# Patient Record
Sex: MALE | Race: WHITE | NOT HISPANIC OR LATINO | URBAN - METROPOLITAN AREA
[De-identification: names, ages, dates, MRNs, and addresses within clinical notes are randomized per-mention and may not be internally consistent; named-entity substitution may affect disease eponyms.]

---

## 2019-01-11 ENCOUNTER — INPATIENT (INPATIENT)
Facility: HOSPITAL | Age: 79
LOS: 17 days | Discharge: HOME | DRG: 246 | End: 2019-01-29
Attending: HOSPITALIST | Admitting: INTERNAL MEDICINE
Payer: COMMERCIAL

## 2019-01-11 VITALS
RESPIRATION RATE: 18 BRPM | WEIGHT: 222.01 LBS | HEIGHT: 67 IN | TEMPERATURE: 98 F | OXYGEN SATURATION: 100 % | DIASTOLIC BLOOD PRESSURE: 94 MMHG | SYSTOLIC BLOOD PRESSURE: 150 MMHG | HEART RATE: 66 BPM

## 2019-01-11 DIAGNOSIS — Z95.1 PRESENCE OF AORTOCORONARY BYPASS GRAFT: Chronic | ICD-10-CM

## 2019-01-11 DIAGNOSIS — I47.2 VENTRICULAR TACHYCARDIA: ICD-10-CM

## 2019-01-11 DIAGNOSIS — Z95.5 PRESENCE OF CORONARY ANGIOPLASTY IMPLANT AND GRAFT: Chronic | ICD-10-CM

## 2019-01-11 PROBLEM — Z00.00 ENCOUNTER FOR PREVENTIVE HEALTH EXAMINATION: Status: ACTIVE | Noted: 2019-01-11

## 2019-01-11 LAB
ALBUMIN SERPL ELPH-MCNC: 3.2 G/DL — LOW (ref 3.3–5)
ALP SERPL-CCNC: 68 U/L — SIGNIFICANT CHANGE UP (ref 40–120)
ALT FLD-CCNC: 54 U/L — HIGH (ref 10–45)
ANION GAP SERPL CALC-SCNC: 11 MMOL/L — SIGNIFICANT CHANGE UP (ref 5–17)
APTT BLD: 27.2 SEC — LOW (ref 27.5–36.3)
AST SERPL-CCNC: 22 U/L — SIGNIFICANT CHANGE UP (ref 10–40)
BASOPHILS # BLD AUTO: 0.1 K/UL — SIGNIFICANT CHANGE UP (ref 0–0.2)
BASOPHILS NFR BLD AUTO: 1 % — SIGNIFICANT CHANGE UP (ref 0–2)
BILIRUB SERPL-MCNC: 0.7 MG/DL — SIGNIFICANT CHANGE UP (ref 0.2–1.2)
BLD GP AB SCN SERPL QL: NEGATIVE — SIGNIFICANT CHANGE UP
BUN SERPL-MCNC: 24 MG/DL — HIGH (ref 7–23)
CALCIUM SERPL-MCNC: 8.7 MG/DL — SIGNIFICANT CHANGE UP (ref 8.4–10.5)
CHLORIDE SERPL-SCNC: 105 MMOL/L — SIGNIFICANT CHANGE UP (ref 96–108)
CK MB BLD-MCNC: 2.9 % — SIGNIFICANT CHANGE UP (ref 0–3.5)
CK MB CFR SERPL CALC: 2.7 NG/ML — SIGNIFICANT CHANGE UP (ref 0–6.7)
CK SERPL-CCNC: 92 U/L — SIGNIFICANT CHANGE UP (ref 30–200)
CO2 SERPL-SCNC: 28 MMOL/L — SIGNIFICANT CHANGE UP (ref 22–31)
CREAT SERPL-MCNC: 0.85 MG/DL — SIGNIFICANT CHANGE UP (ref 0.5–1.3)
EOSINOPHIL # BLD AUTO: 0.1 K/UL — SIGNIFICANT CHANGE UP (ref 0–0.5)
EOSINOPHIL NFR BLD AUTO: 2 % — SIGNIFICANT CHANGE UP (ref 0–6)
GAS PNL BLDA: SIGNIFICANT CHANGE UP
GLUCOSE BLDC GLUCOMTR-MCNC: 182 MG/DL — HIGH (ref 70–99)
GLUCOSE BLDC GLUCOMTR-MCNC: 184 MG/DL — HIGH (ref 70–99)
GLUCOSE SERPL-MCNC: 202 MG/DL — HIGH (ref 70–99)
HCT VFR BLD CALC: 36.2 % — LOW (ref 39–50)
HGB BLD-MCNC: 11.8 G/DL — LOW (ref 13–17)
INR BLD: 1.17 RATIO — HIGH (ref 0.88–1.16)
LYMPHOCYTES # BLD AUTO: 0.7 K/UL — LOW (ref 1–3.3)
LYMPHOCYTES # BLD AUTO: 11.8 % — LOW (ref 13–44)
MAGNESIUM SERPL-MCNC: 2.1 MG/DL — SIGNIFICANT CHANGE UP (ref 1.6–2.6)
MCHC RBC-ENTMCNC: 32.7 GM/DL — SIGNIFICANT CHANGE UP (ref 32–36)
MCHC RBC-ENTMCNC: 33.7 PG — SIGNIFICANT CHANGE UP (ref 27–34)
MCV RBC AUTO: 103 FL — HIGH (ref 80–100)
MONOCYTES # BLD AUTO: 0.3 K/UL — SIGNIFICANT CHANGE UP (ref 0–0.9)
MONOCYTES NFR BLD AUTO: 4.6 % — SIGNIFICANT CHANGE UP (ref 2–14)
NEUTROPHILS # BLD AUTO: 4.7 K/UL — SIGNIFICANT CHANGE UP (ref 1.8–7.4)
NEUTROPHILS NFR BLD AUTO: 80.7 % — HIGH (ref 43–77)
NT-PROBNP SERPL-SCNC: 2837 PG/ML — HIGH (ref 0–300)
PHOSPHATE SERPL-MCNC: 3.4 MG/DL — SIGNIFICANT CHANGE UP (ref 2.5–4.5)
PLATELET # BLD AUTO: 130 K/UL — LOW (ref 150–400)
POTASSIUM SERPL-MCNC: 4.3 MMOL/L — SIGNIFICANT CHANGE UP (ref 3.5–5.3)
POTASSIUM SERPL-SCNC: 4.3 MMOL/L — SIGNIFICANT CHANGE UP (ref 3.5–5.3)
PROT SERPL-MCNC: 5.9 G/DL — LOW (ref 6–8.3)
PROTHROM AB SERPL-ACNC: 13.4 SEC — HIGH (ref 10–12.9)
RBC # BLD: 3.51 M/UL — LOW (ref 4.2–5.8)
RBC # FLD: 13 % — SIGNIFICANT CHANGE UP (ref 10.3–14.5)
RH IG SCN BLD-IMP: POSITIVE — SIGNIFICANT CHANGE UP
SODIUM SERPL-SCNC: 144 MMOL/L — SIGNIFICANT CHANGE UP (ref 135–145)
TROPONIN T, HIGH SENSITIVITY RESULT: 185 NG/L — HIGH (ref 0–51)
WBC # BLD: 5.8 K/UL — SIGNIFICANT CHANGE UP (ref 3.8–10.5)
WBC # FLD AUTO: 5.8 K/UL — SIGNIFICANT CHANGE UP (ref 3.8–10.5)

## 2019-01-11 PROCEDURE — 93459 L HRT ART/GRFT ANGIO: CPT | Mod: 26,GC

## 2019-01-11 PROCEDURE — 71045 X-RAY EXAM CHEST 1 VIEW: CPT | Mod: 26

## 2019-01-11 PROCEDURE — 76937 US GUIDE VASCULAR ACCESS: CPT | Mod: 26,GC

## 2019-01-11 PROCEDURE — 93010 ELECTROCARDIOGRAM REPORT: CPT

## 2019-01-11 RX ORDER — SIMVASTATIN 20 MG/1
1 TABLET, FILM COATED ORAL
Qty: 0 | Refills: 0 | COMMUNITY

## 2019-01-11 RX ORDER — CHLORHEXIDINE GLUCONATE 213 G/1000ML
1 SOLUTION TOPICAL
Qty: 0 | Refills: 0 | Status: DISCONTINUED | OUTPATIENT
Start: 2019-01-11 | End: 2019-01-25

## 2019-01-11 RX ORDER — INSULIN LISPRO 100/ML
VIAL (ML) SUBCUTANEOUS AT BEDTIME
Qty: 0 | Refills: 0 | Status: DISCONTINUED | OUTPATIENT
Start: 2019-01-11 | End: 2019-01-29

## 2019-01-11 RX ORDER — INSULIN LISPRO 100/ML
VIAL (ML) SUBCUTANEOUS
Qty: 0 | Refills: 0 | Status: DISCONTINUED | OUTPATIENT
Start: 2019-01-11 | End: 2019-01-29

## 2019-01-11 RX ORDER — PANTOPRAZOLE SODIUM 20 MG/1
1 TABLET, DELAYED RELEASE ORAL
Qty: 0 | Refills: 0 | COMMUNITY

## 2019-01-11 RX ORDER — INSULIN GLARGINE 100 [IU]/ML
8 INJECTION, SOLUTION SUBCUTANEOUS ONCE
Qty: 0 | Refills: 0 | Status: COMPLETED | OUTPATIENT
Start: 2019-01-11 | End: 2019-01-12

## 2019-01-11 RX ORDER — ASPIRIN/CALCIUM CARB/MAGNESIUM 324 MG
81 TABLET ORAL DAILY
Qty: 0 | Refills: 0 | Status: DISCONTINUED | OUTPATIENT
Start: 2019-01-11 | End: 2019-01-29

## 2019-01-11 RX ORDER — LISINOPRIL 2.5 MG/1
10 TABLET ORAL DAILY
Qty: 0 | Refills: 0 | Status: DISCONTINUED | OUTPATIENT
Start: 2019-01-12 | End: 2019-01-12

## 2019-01-11 RX ORDER — GLUCAGON INJECTION, SOLUTION 0.5 MG/.1ML
1 INJECTION, SOLUTION SUBCUTANEOUS ONCE
Qty: 0 | Refills: 0 | Status: DISCONTINUED | OUTPATIENT
Start: 2019-01-11 | End: 2019-01-29

## 2019-01-11 RX ORDER — GLIMEPIRIDE 1 MG
1 TABLET ORAL
Qty: 0 | Refills: 0 | COMMUNITY

## 2019-01-11 RX ORDER — DEXTROSE 50 % IN WATER 50 %
12.5 SYRINGE (ML) INTRAVENOUS ONCE
Qty: 0 | Refills: 0 | Status: DISCONTINUED | OUTPATIENT
Start: 2019-01-11 | End: 2019-01-29

## 2019-01-11 RX ORDER — DEXTROSE 50 % IN WATER 50 %
25 SYRINGE (ML) INTRAVENOUS ONCE
Qty: 0 | Refills: 0 | Status: DISCONTINUED | OUTPATIENT
Start: 2019-01-11 | End: 2019-01-29

## 2019-01-11 RX ORDER — LOSARTAN POTASSIUM 100 MG/1
1 TABLET, FILM COATED ORAL
Qty: 0 | Refills: 0 | COMMUNITY

## 2019-01-11 RX ORDER — AMIODARONE HYDROCHLORIDE 400 MG/1
200 TABLET ORAL DAILY
Qty: 0 | Refills: 0 | Status: DISCONTINUED | OUTPATIENT
Start: 2019-01-11 | End: 2019-01-29

## 2019-01-11 RX ORDER — FUROSEMIDE 40 MG
40 TABLET ORAL ONCE
Qty: 0 | Refills: 0 | Status: COMPLETED | OUTPATIENT
Start: 2019-01-11 | End: 2019-01-11

## 2019-01-11 RX ORDER — SODIUM CHLORIDE 9 MG/ML
1000 INJECTION, SOLUTION INTRAVENOUS
Qty: 0 | Refills: 0 | Status: DISCONTINUED | OUTPATIENT
Start: 2019-01-11 | End: 2019-01-29

## 2019-01-11 RX ORDER — DEXTROSE 50 % IN WATER 50 %
15 SYRINGE (ML) INTRAVENOUS ONCE
Qty: 0 | Refills: 0 | Status: DISCONTINUED | OUTPATIENT
Start: 2019-01-11 | End: 2019-01-29

## 2019-01-11 RX ORDER — CLOPIDOGREL BISULFATE 75 MG/1
1 TABLET, FILM COATED ORAL
Qty: 0 | Refills: 0 | COMMUNITY

## 2019-01-11 RX ORDER — METOPROLOL TARTRATE 50 MG
50 TABLET ORAL DAILY
Qty: 0 | Refills: 0 | Status: DISCONTINUED | OUTPATIENT
Start: 2019-01-12 | End: 2019-01-28

## 2019-01-11 RX ORDER — HEPARIN SODIUM 5000 [USP'U]/ML
5000 INJECTION INTRAVENOUS; SUBCUTANEOUS EVERY 8 HOURS
Qty: 0 | Refills: 0 | Status: DISCONTINUED | OUTPATIENT
Start: 2019-01-11 | End: 2019-01-29

## 2019-01-11 RX ORDER — NYSTATIN 500MM UNIT
500000 POWDER (EA) MISCELLANEOUS
Qty: 0 | Refills: 0 | Status: DISCONTINUED | OUTPATIENT
Start: 2019-01-11 | End: 2019-01-25

## 2019-01-11 RX ORDER — CHOLECALCIFEROL (VITAMIN D3) 125 MCG
2000 CAPSULE ORAL DAILY
Qty: 0 | Refills: 0 | Status: DISCONTINUED | OUTPATIENT
Start: 2019-01-11 | End: 2019-01-29

## 2019-01-11 RX ORDER — PANTOPRAZOLE SODIUM 20 MG/1
40 TABLET, DELAYED RELEASE ORAL
Qty: 0 | Refills: 0 | Status: DISCONTINUED | OUTPATIENT
Start: 2019-01-11 | End: 2019-01-25

## 2019-01-11 RX ORDER — DUTASTERIDE 0.5 MG/1
1 CAPSULE, LIQUID FILLED ORAL
Qty: 0 | Refills: 0 | COMMUNITY

## 2019-01-11 RX ORDER — ALLOPURINOL 300 MG
1 TABLET ORAL
Qty: 0 | Refills: 0 | COMMUNITY

## 2019-01-11 RX ORDER — METFORMIN HYDROCHLORIDE 850 MG/1
1 TABLET ORAL
Qty: 0 | Refills: 0 | COMMUNITY

## 2019-01-11 RX ORDER — ATORVASTATIN CALCIUM 80 MG/1
40 TABLET, FILM COATED ORAL AT BEDTIME
Qty: 0 | Refills: 0 | Status: DISCONTINUED | OUTPATIENT
Start: 2019-01-11 | End: 2019-01-29

## 2019-01-11 RX ORDER — FUROSEMIDE 40 MG
40 TABLET ORAL DAILY
Qty: 0 | Refills: 0 | Status: DISCONTINUED | OUTPATIENT
Start: 2019-01-12 | End: 2019-01-15

## 2019-01-11 RX ORDER — ASPIRIN/CALCIUM CARB/MAGNESIUM 324 MG
1 TABLET ORAL
Qty: 0 | Refills: 0 | COMMUNITY

## 2019-01-11 RX ORDER — AMIODARONE HYDROCHLORIDE 400 MG/1
1 TABLET ORAL
Qty: 0 | Refills: 0 | COMMUNITY

## 2019-01-11 RX ORDER — CHOLECALCIFEROL (VITAMIN D3) 125 MCG
1 CAPSULE ORAL
Qty: 0 | Refills: 0 | COMMUNITY

## 2019-01-11 RX ORDER — METFORMIN HYDROCHLORIDE 850 MG/1
2 TABLET ORAL
Qty: 0 | Refills: 0 | COMMUNITY

## 2019-01-11 RX ORDER — CLOPIDOGREL BISULFATE 75 MG/1
75 TABLET, FILM COATED ORAL DAILY
Qty: 0 | Refills: 0 | Status: DISCONTINUED | OUTPATIENT
Start: 2019-01-11 | End: 2019-01-29

## 2019-01-11 RX ADMIN — Medication 40 MILLIGRAM(S): at 23:51

## 2019-01-11 RX ADMIN — CHLORHEXIDINE GLUCONATE 1 APPLICATION(S): 213 SOLUTION TOPICAL at 21:31

## 2019-01-11 NOTE — PROGRESS NOTE ADULT - SUBJECTIVE AND OBJECTIVE BOX
ACCEPT NOTE     PATIENT:  AUDIE SEGURA  [id number]    CHIEF COMPLAINT:  Patient is a 78y old  Male who presents with a chief complaint of     INTERVAL HISTORYOVERNIGHT EVENTS:  s/p catherization. No acute events.     REVIEW OF SYSTEMS:    Constitutional:     [ ] negative [ ] fevers [ ] chills [ ] weight loss [ ] weight gain  HEENT:                  [ ] negative [ ] dry eyes [ ] eye irritation [ ] postnasal drip [ ] nasal congestion  CV:                         [ ] negative  [ ] chest pain [ ] orthopnea [ ] palpitations [ ] murmur  Resp:                     [ ] negative [ ] cough [ ] shortness of breath [ ] dyspnea [ ] wheezing [ ] sputum [ ] hemoptysis  GI:                          [ ] negative [ ] nausea [ ] vomiting [ ] diarrhea [ ] constipation [ ] abd pain [ ] dysphagia   :                        [ ] negative [ ] dysuria [ ] nocturia [ ] hematuria [ ] increased urinary frequency  Musculoskeletal: [ ] negative [ ] back pain [ ] myalgias [ ] arthralgias [ ] fracture  Skin:                       [ ] negative [ ] rash [ ] itch  Neurological:        [ ] negative [ ] headache [ ] dizziness [ ] syncope [ ] weakness [ ] numbness  Psychiatric:           [ ] negative [ ] anxiety [ ] depression  Endocrine:            [ ] negative [ ] diabetes [ ] thyroid problem  Heme/Lymph:      [ ] negative [ ] anemia [ ] bleeding problem  Allergic/Immune: [ ] negative [ ] itchy eyes [ ] nasal discharge [ ] hives [ ] angioedema    [ ] All other systems negative  [ x] Unable to assess ROS because patient's mental status.     MEDICATIONS:  MEDICATIONS  (STANDING):  aspirin enteric coated 81 milliGRAM(s) Oral daily  dextrose 5%. 1000 milliLiter(s) (50 mL/Hr) IV Continuous <Continuous>  dextrose 50% Injectable 12.5 Gram(s) IV Push once  dextrose 50% Injectable 25 Gram(s) IV Push once  dextrose 50% Injectable 25 Gram(s) IV Push once  insulin lispro (HumaLOG) corrective regimen sliding scale   SubCutaneous three times a day before meals  insulin lispro (HumaLOG) corrective regimen sliding scale   SubCutaneous at bedtime    MEDICATIONS  (PRN):  dextrose 40% Gel 15 Gram(s) Oral once PRN Blood Glucose LESS THAN 70 milliGRAM(s)/deciliter  glucagon  Injectable 1 milliGRAM(s) IntraMuscular once PRN Glucose LESS THAN 70 milligrams/deciliter    ALLERGIES:  Allergies  No Known Allergies  Intolerances    OBJECTIVE:  ICU Vital Signs Last 24 Hrs  T(C): 36.6 (2019 14:11), Max: 36.6 (2019 13:23)  T(F): 97.9 (2019 14:11), Max: 97.9 (2019 14:11)  HR: 63 (2019 19:35) (63 - 66)  BP: 155/63 (2019 19:35) (117/101 - 155/63)  BP(mean): 112 (2019 14:11) (112 - 112)  RR: 16 (2019 19:35) (14 - 19)  SpO2: 100% (2019 19:35) (100% - 100%)      POCT Blood Glucose.: 184 mg/dL (2019 19:06)  POCT Blood Glucose.: 182 mg/dL (2019 13:34)    CAPILLARY BLOOD GLUCOSE  POCT Blood Glucose.: 184 mg/dL (2019 19:06)    I&O's Summary    2019 07:01  -  2019 20:14  --------------------------------------------------------  IN: 0 mL / OUT: 450 mL / NET: -450 mL    Daily Height in cm: 170.18 (2019 14:11)    Daily Weight in k.7 (2019 14:11)    PHYSICAL EXAMINATION:  General: WN/WD NAD  HEENT: PERRLA, EOMI, moist mucous membranes  Neurology: A&Ox3, nonfocal, RIVAS x 4  Respiratory: CTA B/L, normal respiratory effort, no wheezes, crackles, rales  CV: RRR, S1S2, no murmurs, rubs or gallops  Abdominal: Soft, NT, ND +BS, Last BM  Extremities: No edema, + peripheral pulses  Skin: Pressure ulcer        LABS:    TELEMETRY:     EKG:     IMAGING: ACCEPT NOTE     PATIENT:  AUDIE SEGURA  [id number]    CHIEF COMPLAINT:  Patient is a 78y old  Male who presents with a chief complaint of     INTERVAL HISTORYOVERNIGHT EVENTS:  s/p catherization. No acute events.     REVIEW OF SYSTEMS:    Constitutional:     [ ] negative [ ] fevers [ ] chills [ ] weight loss [ ] weight gain  HEENT:                  [ ] negative [ ] dry eyes [ ] eye irritation [ ] postnasal drip [ ] nasal congestion  CV:                         [ ] negative  [ ] chest pain [ ] orthopnea [ ] palpitations [ ] murmur  Resp:                     [ ] negative [ ] cough [ ] shortness of breath [ ] dyspnea [ ] wheezing [ ] sputum [ ] hemoptysis  GI:                          [ ] negative [ ] nausea [ ] vomiting [ ] diarrhea [ ] constipation [ ] abd pain [ ] dysphagia   :                        [ ] negative [ ] dysuria [ ] nocturia [ ] hematuria [ ] increased urinary frequency  Musculoskeletal: [ ] negative [ ] back pain [ ] myalgias [ ] arthralgias [ ] fracture  Skin:                       [ ] negative [ ] rash [ ] itch  Neurological:        [ ] negative [ ] headache [ ] dizziness [ ] syncope [ ] weakness [ ] numbness  Psychiatric:           [ ] negative [ ] anxiety [ ] depression  Endocrine:            [ ] negative [ ] diabetes [ ] thyroid problem  Heme/Lymph:      [ ] negative [ ] anemia [ ] bleeding problem  Allergic/Immune: [ ] negative [ ] itchy eyes [ ] nasal discharge [ ] hives [ ] angioedema    [ ] All other systems negative  [ x] Unable to assess ROS because patient's mental status.     MEDICATIONS:  MEDICATIONS  (STANDING):  aspirin enteric coated 81 milliGRAM(s) Oral daily  dextrose 5%. 1000 milliLiter(s) (50 mL/Hr) IV Continuous <Continuous>  dextrose 50% Injectable 12.5 Gram(s) IV Push once  dextrose 50% Injectable 25 Gram(s) IV Push once  dextrose 50% Injectable 25 Gram(s) IV Push once  insulin lispro (HumaLOG) corrective regimen sliding scale   SubCutaneous three times a day before meals  insulin lispro (HumaLOG) corrective regimen sliding scale   SubCutaneous at bedtime    MEDICATIONS  (PRN):  dextrose 40% Gel 15 Gram(s) Oral once PRN Blood Glucose LESS THAN 70 milliGRAM(s)/deciliter  glucagon  Injectable 1 milliGRAM(s) IntraMuscular once PRN Glucose LESS THAN 70 milligrams/deciliter    ALLERGIES:  Allergies  No Known Allergies  Intolerances    OBJECTIVE:  ICU Vital Signs Last 24 Hrs  T(C): 36.6 (2019 14:11), Max: 36.6 (2019 13:23)  T(F): 97.9 (2019 14:11), Max: 97.9 (2019 14:11)  HR: 63 (2019 19:35) (63 - 66)  BP: 155/63 (2019 19:35) (117/101 - 155/63)  BP(mean): 112 (2019 14:11) (112 - 112)  RR: 16 (2019 19:35) (14 - 19)  SpO2: 100% (2019 19:35) (100% - 100%)      POCT Blood Glucose.: 184 mg/dL (2019 19:06)  POCT Blood Glucose.: 182 mg/dL (2019 13:34)    CAPILLARY BLOOD GLUCOSE  POCT Blood Glucose.: 184 mg/dL (2019 19:06)    I&O's Summary    2019 07:01  -  2019 20:14  --------------------------------------------------------  IN: 0 mL / OUT: 450 mL / NET: -450 mL    Daily Height in cm: 170.18 (2019 14:11)    Daily Weight in k.7 (2019 14:11)    PHYSICAL EXAMINATION:  General: WN/WD NAD  HEENT: PERRLA, EOMI, moist mucous membranes  Neurology: A&Ox1-2, nonfocal,  Respiratory: Crackles b/l./  CV: RRR, S1S2, no murmurs, rubs or gallops  Abdominal: Soft, NT, ND +BS, Last BM  Extremities: +2 edema bilaterally. + peripheral pulses  Skin: Pressure ulcer        LABS:    TELEMETRY:     EKG:     IMAGING: CCU ACCEPT NOTE     PATIENT:  AUDIE SEGURA  [id number]    CHIEF COMPLAINT:  Patient is a 78y old  Male who presents with a chief complaint of transfer from [facility] for cardiac catheterization.     INTERVAL HISTORY:  Patient arrived to the CCU floor s/p cardiac catheterization which found 95% LCx in stent thrombosis and 95% stenosis to the RPDA.     REVIEW OF SYSTEMS:    Constitutional:     [ ] negative [ ] fevers [ ] chills [ ] weight loss [ ] weight gain  HEENT:                  [ ] negative [ ] dry eyes [ ] eye irritation [ ] postnasal drip [ ] nasal congestion  CV:                         [ ] negative  [ ] chest pain [ ] orthopnea [ ] palpitations [ ] murmur  Resp:                     [ ] negative [ ] cough [ ] shortness of breath [ ] dyspnea [ ] wheezing [ ] sputum [ ] hemoptysis  GI:                          [ ] negative [ ] nausea [ ] vomiting [ ] diarrhea [ ] constipation [ ] abd pain [ ] dysphagia   :                        [ ] negative [ ] dysuria [ ] nocturia [ ] hematuria [ ] increased urinary frequency  Musculoskeletal: [ ] negative [ ] back pain [ ] myalgias [ ] arthralgias [ ] fracture  Skin:                       [ ] negative [ ] rash [ ] itch  Neurological:        [ ] negative [ ] headache [ ] dizziness [ ] syncope [ ] weakness [ ] numbness  Psychiatric:           [ ] negative [ ] anxiety [ ] depression  Endocrine:            [ ] negative [ ] diabetes [ ] thyroid problem  Heme/Lymph:      [ ] negative [ ] anemia [ ] bleeding problem  Allergic/Immune: [ ] negative [ ] itchy eyes [ ] nasal discharge [ ] hives [ ] angioedema    [ ] All other systems negative  [ x] Unable to assess ROS because patient's mental status.     MEDICATIONS:  MEDICATIONS  (STANDING):  aspirin enteric coated 81 milliGRAM(s) Oral daily  dextrose 5%. 1000 milliLiter(s) (50 mL/Hr) IV Continuous <Continuous>  dextrose 50% Injectable 12.5 Gram(s) IV Push once  dextrose 50% Injectable 25 Gram(s) IV Push once  dextrose 50% Injectable 25 Gram(s) IV Push once  insulin lispro (HumaLOG) corrective regimen sliding scale   SubCutaneous three times a day before meals  insulin lispro (HumaLOG) corrective regimen sliding scale   SubCutaneous at bedtime    MEDICATIONS  (PRN):  dextrose 40% Gel 15 Gram(s) Oral once PRN Blood Glucose LESS THAN 70 milliGRAM(s)/deciliter  glucagon  Injectable 1 milliGRAM(s) IntraMuscular once PRN Glucose LESS THAN 70 milligrams/deciliter    ALLERGIES:  Allergies  No Known Allergies  Intolerances    OBJECTIVE:  ICU Vital Signs Last 24 Hrs  T(C): 36.6 (2019 14:11), Max: 36.6 (2019 13:23)  T(F): 97.9 (2019 14:11), Max: 97.9 (2019 14:11)  HR: 63 (2019 19:35) (63 - 66)  BP: 155/63 (2019 19:35) (117/101 - 155/63)  BP(mean): 112 (2019 14:11) (112 - 112)  RR: 16 (2019 19:35) (14 - 19)  SpO2: 100% (2019 19:35) (100% - 100%)      POCT Blood Glucose.: 184 mg/dL (2019 19:06)  POCT Blood Glucose.: 182 mg/dL (2019 13:34)    CAPILLARY BLOOD GLUCOSE  POCT Blood Glucose.: 184 mg/dL (2019 19:06)    I&O's Summary    2019 07:01  -  2019 20:14  --------------------------------------------------------  IN: 0 mL / OUT: 450 mL / NET: -450 mL    Daily Height in cm: 170.18 (2019 14:11)    Daily Weight in k.7 (2019 14:11)    PHYSICAL EXAMINATION:  General: WN/WD NAD  HEENT: PERRLA, EOMI, moist mucous membranes  Neurology: A&Ox1-2, nonfocal,  Respiratory: Crackles b/l./  CV: RRR, S1S2, no murmurs, rubs or gallops  Abdominal: Soft, NT, ND +BS, Last BM  Extremities: +2 edema bilaterally. + peripheral pulses  Skin: Pressure ulcer        LABS:    TELEMETRY:     EKG:     IMAGING: CCU ACCEPT NOTE     PATIENT:  AUDIE SEGURA  [id number]    CHIEF COMPLAINT:  Patient is a 78y old  Male who presents with a chief complaint of transfer from [facility] for cardiac catheterization.     INTERVAL HISTORY:  Patient arrived to the CCU floor s/p cardiac catheterization which found 95% LCx in stent thrombosis and 95% stenosis to the RPDA. Patient was transferred to the CCU for monitoring and optimization prior to further intervention.     REVIEW OF SYSTEMS:    Constitutional:     [ ] negative [ ] fevers [ ] chills [ ] weight loss [ ] weight gain  HEENT:                  [ ] negative [ ] dry eyes [ ] eye irritation [ ] postnasal drip [ ] nasal congestion  CV:                         [ ] negative  [ ] chest pain [ ] orthopnea [ ] palpitations [ ] murmur  Resp:                     [ ] negative [ ] cough [ ] shortness of breath [ ] dyspnea [ ] wheezing [ ] sputum [ ] hemoptysis  GI:                          [ ] negative [ ] nausea [ ] vomiting [ ] diarrhea [ ] constipation [ ] abd pain [ ] dysphagia   :                        [ ] negative [ ] dysuria [ ] nocturia [ ] hematuria [ ] increased urinary frequency  Musculoskeletal: [ ] negative [ ] back pain [ ] myalgias [ ] arthralgias [ ] fracture  Skin:                       [ ] negative [ ] rash [ ] itch  Neurological:        [ ] negative [ ] headache [ ] dizziness [ ] syncope [ ] weakness [ ] numbness  Psychiatric:           [ ] negative [ ] anxiety [ ] depression  Endocrine:            [ ] negative [ ] diabetes [ ] thyroid problem  Heme/Lymph:      [ ] negative [ ] anemia [ ] bleeding problem  Allergic/Immune: [ ] negative [ ] itchy eyes [ ] nasal discharge [ ] hives [ ] angioedema    [ ] All other systems negative  [ x] Unable to assess ROS because patient's mental status.     MEDICATIONS:  MEDICATIONS  (STANDING):  aspirin enteric coated 81 milliGRAM(s) Oral daily  dextrose 5%. 1000 milliLiter(s) (50 mL/Hr) IV Continuous <Continuous>  dextrose 50% Injectable 12.5 Gram(s) IV Push once  dextrose 50% Injectable 25 Gram(s) IV Push once  dextrose 50% Injectable 25 Gram(s) IV Push once  insulin lispro (HumaLOG) corrective regimen sliding scale   SubCutaneous three times a day before meals  insulin lispro (HumaLOG) corrective regimen sliding scale   SubCutaneous at bedtime    MEDICATIONS  (PRN):  dextrose 40% Gel 15 Gram(s) Oral once PRN Blood Glucose LESS THAN 70 milliGRAM(s)/deciliter  glucagon  Injectable 1 milliGRAM(s) IntraMuscular once PRN Glucose LESS THAN 70 milligrams/deciliter    ALLERGIES:  Allergies  No Known Allergies  Intolerances    OBJECTIVE:  ICU Vital Signs Last 24 Hrs  T(C): 36.6 (2019 14:11), Max: 36.6 (2019 13:23)  T(F): 97.9 (2019 14:11), Max: 97.9 (2019 14:11)  HR: 63 (2019 19:35) (63 - 66)  BP: 155/63 (2019 19:35) (117/101 - 155/63)  BP(mean): 112 (2019 14:11) (112 - 112)  RR: 16 (2019 19:35) (14 - 19)  SpO2: 100% (2019 19:35) (100% - 100%)      POCT Blood Glucose.: 184 mg/dL (2019 19:06)  POCT Blood Glucose.: 182 mg/dL (2019 13:34)    CAPILLARY BLOOD GLUCOSE  POCT Blood Glucose.: 184 mg/dL (2019 19:06)    I&O's Summary    2019 07:01  -  2019 20:14  --------------------------------------------------------  IN: 0 mL / OUT: 450 mL / NET: -450 mL    Daily Height in cm: 170.18 (2019 14:11)    Daily Weight in k.7 (2019 14:11)    PHYSICAL EXAMINATION:  General: WN/WD NAD  HEENT: PERRLA, EOMI, moist mucous membranes  Neurology: A&Ox1-2, nonfocal,  Respiratory: Crackles b/l./  CV: RRR, S1S2, no murmurs, rubs or gallops  Abdominal: Soft, NT, ND +BS, Last BM  Extremities: +2 edema bilaterally. + peripheral pulses  Skin: Pressure ulcer        LABS:                        11.8   5.8   )-----------( 130      ( 2019 20:46 )             36.2           144  |  105  |  24<H>  ----------------------------<  202<H>  4.3   |  28  |  0.85    Ca    8.7      2019 20:46  Phos  3.4       Mg     2.1         TPro  5.9<L>  /  Alb  3.2<L>  /  TBili  0.7  /  DBili  x   /  AST  22  /  ALT  54<H>  /  AlkPhos  68      Troponin T, High Sensitivity (19 @ 20:46)    Troponin T, High Sensitivity Result: 185:     Serum Pro-Brain Natriuretic Peptide (19 @ 20:46)    Serum Pro-Brain Natriuretic Peptide: 2837 pg/mL      TELEMETRY: normal sinus rhythm     EKG: sinus rhythm at 68 BPM, right bundle branch block noted    IMAGING: CXR pending

## 2019-01-11 NOTE — H&P CARDIOLOGY - PMH
CAD (coronary artery disease)    DM (diabetes mellitus)  A1c 7.1  HLD (hyperlipidemia)    HTN (hypertension)    MI (myocardial infarction)

## 2019-01-11 NOTE — PROGRESS NOTE ADULT - ASSESSMENT
77 y/o male with PMHx of CAD with s/p CABG 4v in 1997, multiple stents, HTN, PUD, Gout, DM2 presented to [facility] on Dec 31st after unwitnessed fall. Admitted to [facility] on telemetry floor to r/o mechanical vs Arrhythmogenic syncope, w/ elevated trops. Hospital course c/b V. Fib arrest ROSC ~ 20 minutes of CPR,  intubated and moved to ICU. Patient got self extubated 1/9, continued on BiPAP,. Hospital course was c/b Sepsis 2/2 aspiration pneumonia and CHF. Transferred to [facility] for cardiac evaluation; cardiac cath and possible AICD placement.       #Neuro  - AxO1   #Resp  -     #CV  - Aspirin, statin     #GI    #ID    #Renal    #Heme    #Endo  - Moderate SSI. Hgba1c, TSH ordered.     #Skin  - Pressure ulcer.  Wound consult     #DVT PPx 79 y/o male with PMHx of CAD with s/p CABG 4v in 1997, multiple stents, HTN, PUD, Gout, DM2, and Acute HFrEF (EF 30-35%)     presented to [facility] on Dec 31st after unwitnessed fall. Admitted to [facility] on telemetry floor to r/o mechanical vs Arrhythmogenic syncope, w/ elevated trops. Hospital course c/b V. Fib arrest ROSC ~ 20 minutes of CPR,  intubated and moved to ICU. Patient got self extubated 1/9, continued on BiPAP,. Hospital course was c/b/ NSTEMI, Sepsis 2/2 aspiration pneumonia and CHF. Transferred to [facility] for cardiac evaluation; cardiac cath and possible AICD placement. s/p C with multi vessel disease.       #Neuro  - AxO1-2     #Resp  - c/w bipap     #CV  - Aspirin, statin     #GI    #ID    #Renal    #Heme    #Endo  - Moderate SSI. Hgba1c, TSH ordered.     #Skin  - Pressure ulcer.  Wound consult     #DVT PPx 77 y/o male with PMHx of CAD with s/p CABG 4v in 1997, multiple stents, HTN, PUD, Gout, DM2, and Acute HFrEF (EF 30-35%) who presented to [facility] on Dec 31st after an unwitnessed fall. Admitted to [facility] on telemetry floor to r/o mechanical vs Arrhythmogenic syncope, w/ elevated trops. Hospital course c/b V. Fib arrest ROSC ~ 20 minutes of CPR, intubated and moved to ICU. Patient self extubated on 1/9 and was continued on BiPAP. His hospital course was complicated by NSTEMI, Sepsis 2/2 aspiration pneumonia and CHF. He was transferred to [facility] for cardiac evaluation; cardiac cath and possible AICD placement. He is now s/p [facility] with multi vessel disease.       #Neuro  - AxO1-2     #Resp  - patient was intubated after Vfib arrest and self extubated himself on 1/9, has been using BiPAP since  - in CCU, weaned off BiPAP onto NC and sating well  - was treated for aspiration PNA with vancomycin and zosyn, no signs of infection at this time  - will monitor off abx     #CV  [NSTEMI with VT arrest]  - patient s/p VT arrest with ROSC  - s/p amio gtt and now on PO amio 200mg daily   - continue ASA and plavix  - s/p [facility] showing 95% disease in LCx and 95% stenosis to the RPDA with plans for intervention after diuresis and optimization of respiratory status   - continue lisinopril 10mg daily and toprol 50mg daily  - continue statin    [HFrEF]  - per echo at [facility], EF 35% with severely decreased LF systolic function  - continue to assess volume status and diurese if needed clinically  - follow up CXR to assess for pulmonary edema       #GI  - DASH/CC diet when off BiPAP    #ID  - patient treated for aspiration pneumonia with vancomycin and zosyn but unclear for what duration  - patient afebrile with no leukocytosis and no signs of PNA at this time  - per [facility] records, blood cultures repeated on 1/8 with NGTD  - CXR pending, will monitor off abx for now and start if clinically warranted    #Renal  - monitor electrolytes carefully  - patient appears to be at baseline Cr of 0.85    #Heme  - no acute issues    #Endo  - patient with known T2DM, on levemir 35U BID at home  - was having problems with hypoglycemia at OSH  - will given 15U lantus tonight and place on moderate sliding scale  - uptitrate insulin as needed     #Skin  - Pressure ulcer.  Wound consult     #DVT PPx  - heparin subq    #Ethics  - patient with completed MOLST form in chart stating patient is DNR/DNI however form was rescinded on 1/9 by daughter for cath? Need to follow up with family about goals 79 y/o male with PMHx of CAD with s/p CABG 4v in 1997, multiple stents, HTN, PUD, Gout, DM2, and Acute HFrEF (EF 30-35%) who presented to [facility] on Dec 31st after an unwitnessed fall. Admitted to [facility] on telemetry floor to r/o mechanical vs Arrhythmogenic syncope, w/ elevated trops. Hospital course c/b V. Fib arrest ROSC ~ 20 minutes of CPR, intubated and moved to ICU. Patient self extubated on 1/9 and was continued on BiPAP. His hospital course was complicated by NSTEMI, Sepsis 2/2 aspiration pneumonia and CHF. He was transferred to [facility] for cardiac evaluation; cardiac cath and possible AICD placement. He is now s/p [facility] with multi vessel disease.       #Neuro  - AxO1-2     #Resp  - patient was intubated after Vfib arrest and self extubated himself on 1/9, has been using BiPAP since  - in CCU, weaned off BiPAP onto NC and sating well  - was treated for aspiration PNA with vancomycin and zosyn, no signs of infection at this time  - will monitor off abx     #CV  [NSTEMI with VT arrest]  - patient s/p VT arrest with ROSC  - s/p amio gtt and now on PO amio 200mg daily   - continue ASA and plavix  - s/p [facility] showing 95% disease in LCx and 95% stenosis to the RPDA with plans for intervention after diuresis and optimization of respiratory status   - holding lisinopril in anticipation of cath and continue toprol 50mg daily  - continue statin    [HFrEF]  - per echo at [facility], EF 35% with severely decreased LF systolic function  - CXR with pulmonary edema and ronchorous breath sounds on exam  - IV lasix 40mg daily       #GI  - DASH/CC diet when off BiPAP    #ID  - patient treated for aspiration pneumonia with vancomycin and zosyn but unclear for what duration  - patient afebrile with no leukocytosis and no signs of PNA at this time  - per [facility] records, blood cultures repeated on 1/8 with NGTD  - CXR without signs of consolidation will monitor off abx for now and start if clinically warranted    #Renal  - monitor electrolytes carefully  - patient appears to be at baseline Cr of 0.85    #Heme  - no acute issues    #Endo  - patient with known T2DM, on levemir 35U BID at home  - was having problems with hypoglycemia at OSH  - will give 8U lantus tonight and place on moderate sliding scale  - uptitrate insulin as needed     #Skin  - Pressure ulcer.  Wound consult     #DVT PPx  - heparin subq    #Ethics  - patient with completed MOLST form in chart stating patient is DNR/DNI however form was rescinded on 1/9 by daughter for cath? Need to follow up with family about goals

## 2019-01-11 NOTE — H&P CARDIOLOGY - HISTORY OF PRESENT ILLNESS
79 y/o male with PMHx of CAD with s/p CABG 4v in 1997, multiple stents, HTN, PUD, Gout, DM2 presented to [facility] on Dec 31st after unwitnessed fall. According to family, patient lives alone and they believe they found him on the floor after two days. Patient got admitted to [facility] on telemetry floor to r/o mechanical vs Arrhythmogenic. While admitted on the floor patient had V. Fib arrest with full code, returned to rhythm after 20 minutes of CPR,  intubated and moved to ICU. Patient got self extubated two days ago and was put on BiPAP, which is tolerating well. Patient at present is oriented to self and immediate family. Patient had an Echo done on 12/31 with EF of 60% and repeat TTE on 1/2 showed EF of30-35%. Was diagnosed with septic shock and was on pressors and antibiotics ( course completed as per documents) Patient had  CHF and aspiration PNA and elevated troponin during the hospital stay. Transferred to [facility] for cardiac evaluation; cardiac cath and possible AICD placement. Patient on BiPAP with 35% with saturation of 100%.  GFR 58 from 1/8, Creat: 1 from 1/10, H/H: 10.3/33.8, PLT: 127  Patient has +3 edema on florentino Left UE and un stageable Pressure ulcer on the sacrum  Patient is DNR but rescind for cath. 77 y/o male with PMHx of CAD with s/p CABG 4v in 1997, multiple stents, HTN, PUD, Gout, DM2 presented to [facility] on Dec 31st after unwitnessed fall. According to family, patient lives alone and they believe they found him on the floor after two days. Patient got admitted to [facility] on telemetry floor to r/o mechanical vs Arrhythmogenic. While admitted on the floor patient had V. Fib arrest with full code, returned to rhythm after 20 minutes of CPR,  intubated and moved to ICU. Patient got self extubated two days ago and was put on BiPAP, which is tolerating well. Patient at present is oriented to self and immediate family. Patient had an Echo done on 12/31 with EF of 60% and repeat TTE on 1/2 showed EF of30-35%. Was diagnosed with septic shock and was on pressors and antibiotics ( course completed as per documents) Patient had  CHF and aspiration PNA and elevated troponin during the hospital stay. Transferred to [facility] for cardiac evaluation; cardiac cath and possible AICD placement. Patient on BiPAP with 35% with saturation of 100%.  CT chest on 1/8:mils b/l pleural effusion with adjacent atelectasis. Right mid lung ground glass opacity  GFR 58 from 1/8, Creat: 1 from 1/10, H/H: 10.3/33.8, PLT: 127  Patient has +3 edema on the Left UE and un stageable Pressure ulcer on the sacrum  Patient is DNR but rescinded for cath. 79 y/o male with PMHx of CAD with s/p CABG 4v in 1997, multiple stents, HTN, PUD, Gout, DM2 presented to [facility] on Dec 31st after unwitnessed fall. According to family, patient lives alone and they believe they found him on the floor on the second day. Patient got admitted to [facility] on telemetry floor to r/o mechanical vs Arrhythmogenic syncope. While admitted on the floor patient had elevated troponin  followed by V. Fib arrest with full code, returned to rhythm after 20 minutes of CPR,  intubated and moved to ICU. Patient got self extubated two days ago and was put on BiPAP, which is tolerating well. Patient at present is oriented to self and immediate family, but unaware of the time and situation. Patient had an Echo done on 12/31 with EF of 60% and repeat TTE on 1/2 showed EF of30-35%. Was diagnosed with septic shock and was on pressors and antibiotics ( course completed as per documents) Patient had  CHF and aspiration PNA and elevated troponin during the hospital stay. Transferred to [facility] for cardiac evaluation; cardiac cath and possible AICD placement. On BiPAP with 35% with saturation of 100%. Patient is DNR but rescinded for cath.  CT chest on 1/8:mils b/l pleural effusion with adjacent atelectasis. Right mid lung ground glass opacity  GFR 58 from 1/8, Creat: 1 from 1/10, H/H: 10.3/33.8, PLT: 127  Patient has +3 edema on the Left UE and un stageable Pressure ulcer on the sacrum

## 2019-01-12 DIAGNOSIS — E11.8 TYPE 2 DIABETES MELLITUS WITH UNSPECIFIED COMPLICATIONS: ICD-10-CM

## 2019-01-12 DIAGNOSIS — L89.150 PRESSURE ULCER OF SACRAL REGION, UNSTAGEABLE: ICD-10-CM

## 2019-01-12 DIAGNOSIS — D53.9 NUTRITIONAL ANEMIA, UNSPECIFIED: ICD-10-CM

## 2019-01-12 DIAGNOSIS — I50.43 ACUTE ON CHRONIC COMBINED SYSTOLIC (CONGESTIVE) AND DIASTOLIC (CONGESTIVE) HEART FAILURE: ICD-10-CM

## 2019-01-12 DIAGNOSIS — R41.0 DISORIENTATION, UNSPECIFIED: ICD-10-CM

## 2019-01-12 DIAGNOSIS — Z29.9 ENCOUNTER FOR PROPHYLACTIC MEASURES, UNSPECIFIED: ICD-10-CM

## 2019-01-12 DIAGNOSIS — I10 ESSENTIAL (PRIMARY) HYPERTENSION: ICD-10-CM

## 2019-01-12 DIAGNOSIS — I25.10 ATHEROSCLEROTIC HEART DISEASE OF NATIVE CORONARY ARTERY WITHOUT ANGINA PECTORIS: ICD-10-CM

## 2019-01-12 DIAGNOSIS — I21.4 NON-ST ELEVATION (NSTEMI) MYOCARDIAL INFARCTION: ICD-10-CM

## 2019-01-12 LAB
ALBUMIN SERPL ELPH-MCNC: 3.1 G/DL — LOW (ref 3.3–5)
ALP SERPL-CCNC: 71 U/L — SIGNIFICANT CHANGE UP (ref 40–120)
ALT FLD-CCNC: 51 U/L — HIGH (ref 10–45)
ANION GAP SERPL CALC-SCNC: 13 MMOL/L — SIGNIFICANT CHANGE UP (ref 5–17)
AST SERPL-CCNC: 19 U/L — SIGNIFICANT CHANGE UP (ref 10–40)
BASE EXCESS BLDA CALC-SCNC: 10.3 MMOL/L — HIGH (ref -2–2)
BASOPHILS # BLD AUTO: 0.1 K/UL — SIGNIFICANT CHANGE UP (ref 0–0.2)
BASOPHILS NFR BLD AUTO: 1.1 % — SIGNIFICANT CHANGE UP (ref 0–2)
BILIRUB SERPL-MCNC: 0.6 MG/DL — SIGNIFICANT CHANGE UP (ref 0.2–1.2)
BUN SERPL-MCNC: 24 MG/DL — HIGH (ref 7–23)
CALCIUM SERPL-MCNC: 8.7 MG/DL — SIGNIFICANT CHANGE UP (ref 8.4–10.5)
CHLORIDE SERPL-SCNC: 105 MMOL/L — SIGNIFICANT CHANGE UP (ref 96–108)
CHOLEST SERPL-MCNC: 118 MG/DL — SIGNIFICANT CHANGE UP (ref 10–199)
CO2 BLDA-SCNC: 37 MMOL/L — HIGH (ref 22–30)
CO2 SERPL-SCNC: 29 MMOL/L — SIGNIFICANT CHANGE UP (ref 22–31)
CREAT SERPL-MCNC: 0.93 MG/DL — SIGNIFICANT CHANGE UP (ref 0.5–1.3)
EOSINOPHIL # BLD AUTO: 0.1 K/UL — SIGNIFICANT CHANGE UP (ref 0–0.5)
EOSINOPHIL NFR BLD AUTO: 2.2 % — SIGNIFICANT CHANGE UP (ref 0–6)
GAS PNL BLDA: SIGNIFICANT CHANGE UP
GLUCOSE BLDC GLUCOMTR-MCNC: 151 MG/DL — HIGH (ref 70–99)
GLUCOSE BLDC GLUCOMTR-MCNC: 164 MG/DL — HIGH (ref 70–99)
GLUCOSE BLDC GLUCOMTR-MCNC: 173 MG/DL — HIGH (ref 70–99)
GLUCOSE BLDC GLUCOMTR-MCNC: 177 MG/DL — HIGH (ref 70–99)
GLUCOSE BLDC GLUCOMTR-MCNC: 179 MG/DL — HIGH (ref 70–99)
GLUCOSE SERPL-MCNC: 200 MG/DL — HIGH (ref 70–99)
HBA1C BLD-MCNC: 7.8 % — HIGH (ref 4–5.6)
HBA1C BLD-MCNC: 7.9 % — HIGH (ref 4–5.6)
HCO3 BLDA-SCNC: 35 MMOL/L — HIGH (ref 21–29)
HCT VFR BLD CALC: 35.6 % — LOW (ref 39–50)
HDLC SERPL-MCNC: 23 MG/DL — LOW
HGB BLD-MCNC: 11.8 G/DL — LOW (ref 13–17)
LIPID PNL WITH DIRECT LDL SERPL: 70 MG/DL — SIGNIFICANT CHANGE UP
LYMPHOCYTES # BLD AUTO: 0.7 K/UL — LOW (ref 1–3.3)
LYMPHOCYTES # BLD AUTO: 11.7 % — LOW (ref 13–44)
MAGNESIUM SERPL-MCNC: 2.1 MG/DL — SIGNIFICANT CHANGE UP (ref 1.6–2.6)
MCHC RBC-ENTMCNC: 33 GM/DL — SIGNIFICANT CHANGE UP (ref 32–36)
MCHC RBC-ENTMCNC: 33.4 PG — SIGNIFICANT CHANGE UP (ref 27–34)
MCV RBC AUTO: 101 FL — HIGH (ref 80–100)
MONOCYTES # BLD AUTO: 0.3 K/UL — SIGNIFICANT CHANGE UP (ref 0–0.9)
MONOCYTES NFR BLD AUTO: 5.4 % — SIGNIFICANT CHANGE UP (ref 2–14)
NEUTROPHILS # BLD AUTO: 5 K/UL — SIGNIFICANT CHANGE UP (ref 1.8–7.4)
NEUTROPHILS NFR BLD AUTO: 79.7 % — HIGH (ref 43–77)
PCO2 BLDA: 48 MMHG — HIGH (ref 32–46)
PH BLDA: 7.47 — HIGH (ref 7.35–7.45)
PHOSPHATE SERPL-MCNC: 3.9 MG/DL — SIGNIFICANT CHANGE UP (ref 2.5–4.5)
PLATELET # BLD AUTO: 130 K/UL — LOW (ref 150–400)
PO2 BLDA: 103 MMHG — SIGNIFICANT CHANGE UP (ref 74–108)
POTASSIUM SERPL-MCNC: 4.1 MMOL/L — SIGNIFICANT CHANGE UP (ref 3.5–5.3)
POTASSIUM SERPL-SCNC: 4.1 MMOL/L — SIGNIFICANT CHANGE UP (ref 3.5–5.3)
PROCALCITONIN SERPL-MCNC: 0.1 NG/ML — SIGNIFICANT CHANGE UP (ref 0.02–0.1)
PROT SERPL-MCNC: 6 G/DL — SIGNIFICANT CHANGE UP (ref 6–8.3)
RBC # BLD: 3.52 M/UL — LOW (ref 4.2–5.8)
RBC # FLD: 12.9 % — SIGNIFICANT CHANGE UP (ref 10.3–14.5)
SAO2 % BLDA: 98 % — HIGH (ref 92–96)
SODIUM SERPL-SCNC: 147 MMOL/L — HIGH (ref 135–145)
TOTAL CHOLESTEROL/HDL RATIO MEASUREMENT: 5.1 RATIO — SIGNIFICANT CHANGE UP (ref 3.4–9.6)
TRIGL SERPL-MCNC: 124 MG/DL — SIGNIFICANT CHANGE UP (ref 10–149)
TSH SERPL-MCNC: 1.98 UIU/ML — SIGNIFICANT CHANGE UP (ref 0.27–4.2)
WBC # BLD: 6.2 K/UL — SIGNIFICANT CHANGE UP (ref 3.8–10.5)
WBC # FLD AUTO: 6.2 K/UL — SIGNIFICANT CHANGE UP (ref 3.8–10.5)

## 2019-01-12 PROCEDURE — 99223 1ST HOSP IP/OBS HIGH 75: CPT

## 2019-01-12 PROCEDURE — 70450 CT HEAD/BRAIN W/O DYE: CPT | Mod: 26

## 2019-01-12 PROCEDURE — 93010 ELECTROCARDIOGRAM REPORT: CPT

## 2019-01-12 PROCEDURE — 99291 CRITICAL CARE FIRST HOUR: CPT

## 2019-01-12 RX ADMIN — CHLORHEXIDINE GLUCONATE 1 APPLICATION(S): 213 SOLUTION TOPICAL at 05:29

## 2019-01-12 RX ADMIN — ATORVASTATIN CALCIUM 40 MILLIGRAM(S): 80 TABLET, FILM COATED ORAL at 21:51

## 2019-01-12 RX ADMIN — Medication 2000 UNIT(S): at 12:11

## 2019-01-12 RX ADMIN — HEPARIN SODIUM 5000 UNIT(S): 5000 INJECTION INTRAVENOUS; SUBCUTANEOUS at 05:27

## 2019-01-12 RX ADMIN — Medication 500000 UNIT(S): at 05:28

## 2019-01-12 RX ADMIN — Medication 50 MILLIGRAM(S): at 05:27

## 2019-01-12 RX ADMIN — HEPARIN SODIUM 5000 UNIT(S): 5000 INJECTION INTRAVENOUS; SUBCUTANEOUS at 21:51

## 2019-01-12 RX ADMIN — Medication 40 MILLIGRAM(S): at 05:27

## 2019-01-12 RX ADMIN — Medication 2: at 18:02

## 2019-01-12 RX ADMIN — PANTOPRAZOLE SODIUM 40 MILLIGRAM(S): 20 TABLET, DELAYED RELEASE ORAL at 05:28

## 2019-01-12 RX ADMIN — CLOPIDOGREL BISULFATE 75 MILLIGRAM(S): 75 TABLET, FILM COATED ORAL at 12:11

## 2019-01-12 RX ADMIN — Medication 500000 UNIT(S): at 12:11

## 2019-01-12 RX ADMIN — Medication 2: at 12:11

## 2019-01-12 RX ADMIN — HEPARIN SODIUM 5000 UNIT(S): 5000 INJECTION INTRAVENOUS; SUBCUTANEOUS at 14:54

## 2019-01-12 RX ADMIN — AMIODARONE HYDROCHLORIDE 200 MILLIGRAM(S): 400 TABLET ORAL at 05:27

## 2019-01-12 RX ADMIN — Medication 500000 UNIT(S): at 01:00

## 2019-01-12 RX ADMIN — Medication 500000 UNIT(S): at 18:19

## 2019-01-12 RX ADMIN — Medication 81 MILLIGRAM(S): at 12:11

## 2019-01-12 RX ADMIN — INSULIN GLARGINE 8 UNIT(S): 100 INJECTION, SOLUTION SUBCUTANEOUS at 00:37

## 2019-01-12 NOTE — PROGRESS NOTE ADULT - PROBLEM SELECTOR PLAN 2
-Initially required BIPAP. Currently saturating well on RA  -ECHO from OSH revealed EF 30-35%  -Continue Lasix 40mg IV daily  -Daily weights and Strict I/Os  -Follow up with EP recommendations regarding AICD placement  -Continue to monitor respiratory status

## 2019-01-12 NOTE — PROGRESS NOTE ADULT - PROBLEM SELECTOR PLAN 1
-Complicated with V. 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 Unable to intervene at the time  -Recommendations to medically optimize, stabilize pt and plan for PCI early next week  -Continue ASA/Plavix/Metoprolol/Statin  -Follow up with Cardiology

## 2019-01-12 NOTE — PROGRESS NOTE ADULT - ASSESSMENT
79yo male with hx of CAD with s/p CABG 4v in 1997, multiple stents, HTN, PUD, Gout, DM2, and Acute HFrEF (EF 30-35%), presented to [facility] from [facility] for NSTEMI management after stabilization from complications of V. Fib arrest s/p ROSC after 20mins of CPR and ICU stay complicated by sepsis secondary to Aspiration PNA. Pt s/p Cardiac cath at [facility] revealing multiple vessel disease without any intervention, recommending medical optimization and stabilization prior to PCI in 1 week. Also recommendation for EP eval for AICD placement.

## 2019-01-12 NOTE — CHART NOTE - NSCHARTNOTEFT_GEN_A_CORE
78M with CAD with s/p CABG 4v in 1997, multiple stents, HTN, PUD, Gout, DM2 presented to [facility] on Dec 31st after unwitnessed fall 2/2 VT arrest. Patient's [facility] course was ccb aspiration pna, pulmonary edema. He was found to have EF of 30-35% and was transferred to [facility] for cath. Cath demonstrated 90 and 95% occlusion of l circ and rpda, respectively however unable to intervene on lesions. Patient is now pending EP eval for AICD and requiring IV lasix for diuresis as well as BiPAP overnight.    To Do:  -BiPAP qHS/prn  -c/w PO amio for VT arrest  -c/w IV diuresis and monitor fluid status  -monitor FS on basal/bolus regimen and adjust as needed  -f/w wound care on decubitus ulcer  -f/u on GOC as DNR/DNI rescinded according to chart documentation.     Taylor Almazan, PGY 3  Internal Medicine  Pager [phone] | [phone] 78M with CAD with s/p CABG 4v in 1997, multiple stents, HTN, PUD, Gout, DM2 presented to [facility] on Dec 31st after unwitnessed fall 2/2 VT arrest. Patient's [facility] course was ccb aspiration pna, pulmonary edema. He was found to have EF of 30-35% and was transferred to [facility] for cath. Cath demonstrated 90 and 95% occlusion of l circ and rpda, respectively however unable to intervene on lesions. Patient is now pending EP eval for AICD and requiring IV lasix for diuresis as well as BiPAP overnight. Patient's daughter states his mental status changed since he fell. Patient reported falling on a Friday and was found on a Sunday. He has been confused since. He was then intubated at [facility] and was unable to talk for a period of time post extubation and has had a waxing/waning mental status since.     To Do:  -BiPAP qHS/prn  -c/w PO amio for VT arrest  -c/w IV diuresis and monitor fluid status  -monitor FS on basal/bolus regimen and adjust as needed  -f/w wound care on decubitus ulcer  -f/u on GOC as DNR/DNI rescinded according to chart documentation. I spoke with patient's daughter Patricia at bedside who states they are not sure what his current code status is. His second daughter is the decision maker and wants to speak with SW prior to making any decisions. They are waiting to see how he does prior to making a decision. We talked about establishing status to avoid emergencies and last minute decisions as well.       Taylor Almazan, PGY 3  Internal Medicine  Pager [phone] | [phone]

## 2019-01-12 NOTE — PROGRESS NOTE ADULT - PROBLEM SELECTOR PLAN 3
-This is likely secondary to hospital acquired  -Unlikely infectious etiology. S/p Vanc/Zosyn at OSH for aspiration PNA. No indication of infection at this time.  -Supportive measures recommended  -Aspiration precautions  -Continue to monitor for infection

## 2019-01-12 NOTE — PROGRESS NOTE ADULT - PROBLEM SELECTOR PLAN 4
-s/p CABG 4v in 1997  -NSTEMI c/b V Fib arrest s/p ROSC after 20mins of CPR.  -Continue Amiodarone   -Management for NSTEMI as above

## 2019-01-12 NOTE — PROGRESS NOTE ADULT - SUBJECTIVE AND OBJECTIVE BOX
HPI:  79yo male with hx of CAD with s/p CABG 4v in 1997, multiple stents, HTN, PUD, Gout, DM2, and Acute HFrEF (EF 30-35%), presented to [facility] from [facility] for NSTEMI management after stabilization from complications of V. Fib arrest s/p ROSC after 20mins of CPR. Pt originally presented to [facility] on 12/31 with complaints of unwitnessed fall. During hospital course, pt was noted to have elevated Troponin and was complicated with V Fib arrest, requiring 20mins of CPR s/p ROSC. Pt was intubated and transferred to the ICU.  ICU stay complicated by septic shock secondary to Aspiration PNA, requiring pressor support as well Abx (Vanc/Zosyn, per records completed course). Pt self extubated himself 2 days later was placed on BIPAP. Pt was transferred to [facility] for cardiac evaluation.  Pt s/p Cardiac cath at [facility] revealing multiple vessel disease without any intervention, recommending medical optimization and stabilization prior to PCI in 1 week. Also recommendation for EP eval for AICD placement. Pt has been weened off of BIPAP and has been tolerating RA.      Home Medications:  allopurinol 300 mg oral tablet: 1 tab(s) orally once a day, home and hospital (11 Jan 2019 16:01)  amiodarone 200 mg oral tablet: 1 tab(s) orally once a day, hospital (11 Jan 2019 16:01)  Aspir 81 oral delayed release tablet: 1 tab(s) orally once a day, home and hospital (11 Jan 2019 16:01)  Avodart 0.5 mg oral capsule: 1 cap(s) orally once a day home (11 Jan 2019 16:01)  cholecalciferol 2000 intl units oral tablet: 1 tab(s) orally once a day, hospital (11 Jan 2019 16:01)  glimepiride 2 mg oral tablet: 1 tab(s) orally 2 times a day, Home (11 Jan 2019 16:01)  Lasix 20 mg oral tablet: 1 tab(s) orally once a day home (11 Jan 2019 16:01)  losartan 50 mg oral tablet: 1 tab(s) orally once a day home (11 Jan 2019 16:01)  metFORMIN 500 mg oral tablet: 2 tab(s) orally 2 times a day home (11 Jan 2019 16:01)  metoprolol succinate 25 mg oral capsule, extended release: 1 cap(s) orally once a day, hospital (11 Jan 2019 16:01)  pantoprazole 40 mg oral delayed release tablet: 1 tab(s) orally once a day, home and hospital (11 Jan 2019 16:01)  Plavix 75 mg oral tablet: 1 tab(s) orally once a day, home and hospital (11 Jan 2019 16:01)  simvastatin 80 mg oral tablet: 1 tab(s) orally once a day (at bedtime), hospital (11 Jan 2019 16:01)  Toprol-XL 50 mg oral tablet, extended release: 1 tab(s) orally once a day home (11 Jan 2019 16:01)      PAST MEDICAL & SURGICAL HISTORY:  MI (myocardial infarction)  HTN (hypertension)  HLD (hyperlipidemia)  DM (diabetes mellitus): A1c 7.1  CAD (coronary artery disease)  S/P primary angioplasty with coronary stent  S/P CABG x 4: 1997 by Dr. Smith      Review of Systems:   CONSTITUTIONAL: No fever, weight loss, or fatigue  EYES: No eye pain, visual disturbances, or discharge  ENMT:  No difficulty hearing, tinnitus, vertigo; No sinus or throat pain  NECK: No pain or stiffness  BREASTS: No pain, masses, or nipple discharge  RESPIRATORY: No cough, wheezing, chills or hemoptysis; No shortness of breath  CARDIOVASCULAR: No chest pain, palpitations, dizziness, or leg swelling  GASTROINTESTINAL: No abdominal or epigastric pain. No nausea, vomiting, or hematemesis; No diarrhea or constipation. No melena or hematochezia.  GENITOURINARY: No dysuria, frequency, hematuria, or incontinence  NEUROLOGICAL: No headaches, memory loss, loss of strength, numbness, or tremors  SKIN: No itching, burning, rashes, or lesions   LYMPH NODES: No enlarged glands  ENDOCRINE: No heat or cold intolerance; No hair loss  MUSCULOSKELETAL: No joint pain or swelling; No muscle, back, or extremity pain  PSYCHIATRIC: No depression, anxiety, mood swings, or difficulty sleeping  HEME/LYMPH: No easy bruising, or bleeding gums  ALLERY AND IMMUNOLOGIC: No hives or eczema    Allergies    No Known Allergies    Intolerances        Social History:     FAMILY HISTORY:   Noncontributory    MEDICATIONS  (STANDING):  amiodarone    Tablet 200 milliGRAM(s) Oral daily  aspirin enteric coated 81 milliGRAM(s) Oral daily  atorvastatin 40 milliGRAM(s) Oral at bedtime  chlorhexidine 4% Liquid 1 Application(s) Topical <User Schedule>  cholecalciferol 2000 Unit(s) Oral daily  clopidogrel Tablet 75 milliGRAM(s) Oral daily  dextrose 5%. 1000 milliLiter(s) (50 mL/Hr) IV Continuous <Continuous>  dextrose 50% Injectable 12.5 Gram(s) IV Push once  dextrose 50% Injectable 25 Gram(s) IV Push once  dextrose 50% Injectable 25 Gram(s) IV Push once  furosemide   Injectable 40 milliGRAM(s) IV Push daily  heparin  Injectable 5000 Unit(s) SubCutaneous every 8 hours  insulin lispro (HumaLOG) corrective regimen sliding scale   SubCutaneous three times a day before meals  insulin lispro (HumaLOG) corrective regimen sliding scale   SubCutaneous at bedtime  metoprolol succinate ER 50 milliGRAM(s) Oral daily  nystatin    Suspension 018939 Unit(s) Oral four times a day  pantoprazole    Tablet 40 milliGRAM(s) Oral before breakfast    MEDICATIONS  (PRN):  dextrose 40% Gel 15 Gram(s) Oral once PRN Blood Glucose LESS THAN 70 milliGRAM(s)/deciliter  glucagon  Injectable 1 milliGRAM(s) IntraMuscular once PRN Glucose LESS THAN 70 milligrams/deciliter      Vital Signs Last 24 Hrs  T(C): 36.8 (12 Jan 2019 16:22), Max: 36.8 (11 Jan 2019 21:15)  T(F): 98.3 (12 Jan 2019 16:22), Max: 98.3 (12 Jan 2019 16:22)  HR: 68 (12 Jan 2019 16:22) (63 - 80)  BP: 137/70 (12 Jan 2019 16:22) (108/49 - 172/83)  BP(mean): 79 (12 Jan 2019 15:00) (75 - 117)  RR: 17 (12 Jan 2019 16:22) (10 - 20)  SpO2: 93% (12 Jan 2019 16:22) (93% - 100%)  CAPILLARY BLOOD GLUCOSE      POCT Blood Glucose.: 151 mg/dL (12 Jan 2019 12:04)  POCT Blood Glucose.: 164 mg/dL (12 Jan 2019 09:54)  POCT Blood Glucose.: 177 mg/dL (12 Jan 2019 08:27)  POCT Blood Glucose.: 184 mg/dL (11 Jan 2019 19:06)        PHYSICAL EXAM:  GENERAL: NAD, well-developed  HEAD:  Atraumatic, Normocephalic  EYES: EOMI, PERRLA, conjunctiva and sclera clear  NECK: Supple, No JVD  CHEST/LUNG: Clear to auscultation bilaterally; No wheeze  HEART: Regular rate and rhythm; No murmurs, rubs, or gallops  ABDOMEN: Soft, Nontender, Nondistended; Bowel sounds present  EXTREMITIES:  2+ Peripheral Pulses, No clubbing, cyanosis, or edema  PSYCH: AAOx3  NEUROLOGY: non-focal  SKIN: No rashes or lesions    LABS:                        11.8   6.2   )-----------( 130      ( 12 Jan 2019 03:24 )             35.6     01-12    147<H>  |  105  |  24<H>  ----------------------------<  200<H>  4.1   |  29  |  0.93    Ca    8.7      12 Jan 2019 03:24  Phos  3.9     01-12  Mg     2.1     01-12    TPro  6.0  /  Alb  3.1<L>  /  TBili  0.6  /  DBili  x   /  AST  19  /  ALT  51<H>  /  AlkPhos  71  01-12    PT/INR - ( 11 Jan 2019 21:29 )   PT: 13.4 sec;   INR: 1.17 ratio         PTT - ( 11 Jan 2019 21:29 )  PTT:27.2 sec  CARDIAC MARKERS ( 11 Jan 2019 20:46 )  x     / x     / 92 U/L / x     / 2.7 ng/mL            RADIOLOGY & ADDITIONAL TESTS:    Imaging Personally Reviewed:    Consultant(s) Notes Reviewed:      Care Discussed with Consultants/Other Providers: ACCEPTANCE NOTE    HPI:  Briefly, Pt is a 79yo male with hx of CAD with s/p CABG 4v in 1997, multiple stents, HTN, PUD, Gout, DM2, and Acute HFrEF (EF 30-35%), presented to [facility] from [facility] for NSTEMI management after stabilization from complications of V. Fib arrest s/p ROSC after 20mins of CPR. Pt originally presented to [facility] on 12/31 with complaints of unwitnessed fall. During hospital course, pt was noted to have elevated Troponin and was complicated with V Fib arrest, requiring 20mins of CPR s/p ROSC. Pt was intubated and transferred to the ICU.  ICU stay complicated by septic shock secondary to Aspiration PNA, requiring pressor support as well Abx (Vanc/Zosyn, per records completed course). Pt self extubated himself 2 days later was placed on BIPAP. Pt was transferred to [facility] for cardiac evaluation.  Pt s/p Cardiac cath at [facility] revealing multiple vessel disease without any intervention, recommending medical optimization and stabilization prior to PCI in 1 week. Also recommendation for EP eval for AICD placement. Pt has been weened off of BIPAP and has been tolerating RA.  Currently pt is a poor historian due to his condition. He was examined at bedside in the presence of his daughter, Patricia Roblero.       Home Medications:  allopurinol 300 mg oral tablet: 1 tab(s) orally once a day, home and hospital (11 Jan 2019 16:01)  amiodarone 200 mg oral tablet: 1 tab(s) orally once a day, hospital (11 Jan 2019 16:01)  Aspir 81 oral delayed release tablet: 1 tab(s) orally once a day, home and hospital (11 Jan 2019 16:01)  Avodart 0.5 mg oral capsule: 1 cap(s) orally once a day home (11 Jan 2019 16:01)  cholecalciferol 2000 intl units oral tablet: 1 tab(s) orally once a day, hospital (11 Jan 2019 16:01)  glimepiride 2 mg oral tablet: 1 tab(s) orally 2 times a day, Home (11 Jan 2019 16:01)  Lasix 20 mg oral tablet: 1 tab(s) orally once a day home (11 Jan 2019 16:01)  losartan 50 mg oral tablet: 1 tab(s) orally once a day home (11 Jan 2019 16:01)  metFORMIN 500 mg oral tablet: 2 tab(s) orally 2 times a day home (11 Jan 2019 16:01)  metoprolol succinate 25 mg oral capsule, extended release: 1 cap(s) orally once a day, hospital (11 Jan 2019 16:01)  pantoprazole 40 mg oral delayed release tablet: 1 tab(s) orally once a day, home and hospital (11 Jan 2019 16:01)  Plavix 75 mg oral tablet: 1 tab(s) orally once a day, home and hospital (11 Jan 2019 16:01)  simvastatin 80 mg oral tablet: 1 tab(s) orally once a day (at bedtime), hospital (11 Jan 2019 16:01)  Toprol-XL 50 mg oral tablet, extended release: 1 tab(s) orally once a day home (11 Jan 2019 16:01)      PAST MEDICAL & SURGICAL HISTORY:  MI (myocardial infarction)  HTN (hypertension)  HLD (hyperlipidemia)  DM (diabetes mellitus): A1c 7.1  CAD (coronary artery disease)  S/P primary angioplasty with coronary stent  S/P CABG x 4: 1997 by Dr. Smith      Review of Systems:   Unable to assess from patient due to his current condition    Allergies:  No Known Allergies  Intolerances    Social History:   Lives home alone.  Former smoker, quit >30years ago    FAMILY HISTORY:  Mother with Heart Disease    MEDICATIONS  (STANDING):  amiodarone    Tablet 200 milliGRAM(s) Oral daily  aspirin enteric coated 81 milliGRAM(s) Oral daily  atorvastatin 40 milliGRAM(s) Oral at bedtime  chlorhexidine 4% Liquid 1 Application(s) Topical <User Schedule>  cholecalciferol 2000 Unit(s) Oral daily  clopidogrel Tablet 75 milliGRAM(s) Oral daily  dextrose 5%. 1000 milliLiter(s) (50 mL/Hr) IV Continuous <Continuous>  dextrose 50% Injectable 12.5 Gram(s) IV Push once  dextrose 50% Injectable 25 Gram(s) IV Push once  dextrose 50% Injectable 25 Gram(s) IV Push once  furosemide   Injectable 40 milliGRAM(s) IV Push daily  heparin  Injectable 5000 Unit(s) SubCutaneous every 8 hours  insulin lispro (HumaLOG) corrective regimen sliding scale   SubCutaneous three times a day before meals  insulin lispro (HumaLOG) corrective regimen sliding scale   SubCutaneous at bedtime  metoprolol succinate ER 50 milliGRAM(s) Oral daily  nystatin    Suspension 056480 Unit(s) Oral four times a day  pantoprazole    Tablet 40 milliGRAM(s) Oral before breakfast    MEDICATIONS  (PRN):  dextrose 40% Gel 15 Gram(s) Oral once PRN Blood Glucose LESS THAN 70 milliGRAM(s)/deciliter  glucagon  Injectable 1 milliGRAM(s) IntraMuscular once PRN Glucose LESS THAN 70 milligrams/deciliter      Vital Signs Last 24 Hrs  T(C): 36.8 (12 Jan 2019 16:22), Max: 36.8 (11 Jan 2019 21:15)  T(F): 98.3 (12 Jan 2019 16:22), Max: 98.3 (12 Jan 2019 16:22)  HR: 68 (12 Jan 2019 16:22) (63 - 80)  BP: 137/70 (12 Jan 2019 16:22) (108/49 - 172/83)  BP(mean): 79 (12 Jan 2019 15:00) (75 - 117)  RR: 17 (12 Jan 2019 16:22) (10 - 20)  SpO2: 93% (12 Jan 2019 16:22) (93% - 100%)  CAPILLARY BLOOD GLUCOSE      POCT Blood Glucose.: 151 mg/dL (12 Jan 2019 12:04)  POCT Blood Glucose.: 164 mg/dL (12 Jan 2019 09:54)  POCT Blood Glucose.: 177 mg/dL (12 Jan 2019 08:27)  POCT Blood Glucose.: 184 mg/dL (11 Jan 2019 19:06)        PHYSICAL EXAM:  GENERAL: NAD, well-developed  HEAD:  Atraumatic, Normocephalic  EYES: EOMI, PERRLA, conjunctiva and sclera clear  NECK: Supple, No JVD  CHEST/LUNG: +Ronchi on auscultation; No wheeze  HEART: Regular rate and rhythm; No murmurs, rubs, or gallops  ABDOMEN: Soft, Nontender, Nondistended; Bowel sounds present  : Calixto in place  EXTREMITIES:  2+ Peripheral Pulses, No clubbing, cyanosis. Upper extremity swelling noted  PSYCH: Calm, Alert  NEUROLOGY: non-focal  SKIN: sacral pressure ulcer. No concern for infection    LABS:                        11.8   6.2   )-----------( 130      ( 12 Jan 2019 03:24 )             35.6     01-12    147<H>  |  105  |  24<H>  ----------------------------<  200<H>  4.1   |  29  |  0.93    Ca    8.7      12 Jan 2019 03:24  Phos  3.9     01-12  Mg     2.1     01-12    TPro  6.0  /  Alb  3.1<L>  /  TBili  0.6  /  DBili  x   /  AST  19  /  ALT  51<H>  /  AlkPhos  71  01-12    PT/INR - ( 11 Jan 2019 21:29 )   PT: 13.4 sec;   INR: 1.17 ratio         PTT - ( 11 Jan 2019 21:29 )  PTT:27.2 sec  CARDIAC MARKERS ( 11 Jan 2019 20:46 )  x     / x     / 92 U/L / x     / 2.7 ng/mL

## 2019-01-12 NOTE — PROGRESS NOTE ADULT - ASSESSMENT
79 y/o male with PMHx of CAD with s/p CABG 4v in 1997, multiple stents, HTN, PUD, Gout, DM2, and Acute HFrEF (EF 30-35%) who presented to [facility] on Dec 31st after an unwitnessed fall. Admitted to [facility] on telemetry floor to r/o mechanical vs Arrhythmogenic syncope, w/ elevated trops. Hospital course c/b V. Fib arrest ROSC ~ 20 minutes of CPR, intubated and moved to ICU. Patient self extubated on 1/9 and was continued on BiPAP. His hospital course was complicated by NSTEMI, Sepsis 2/2 aspiration pneumonia and CHF. He was transferred to [facility] for cardiac evaluation; cardiac cath and possible AICD placement. He is now s/p [facility] with multi vessel disease.       #Neuro  - AxO1-2     #Resp  - patient was intubated after Vfib arrest and self extubated himself on 1/9, has been using BiPAP since  - in CCU, weaned off BiPAP onto NC and sating well  - was treated for aspiration PNA with vancomycin and zosyn, no signs of infection at this time  - will monitor off abx     #CV  [NSTEMI with VT arrest]  - patient s/p VT arrest with ROSC  - s/p amio gtt and now on PO amio 200mg daily   - continue ASA and plavix  - s/p [facility] showing 95% disease in LCx and 95% stenosis to the RPDA with plans for intervention after diuresis and optimization of respiratory status   - continue lisinopril 10mg daily and toprol 50mg daily  - continue statin    [HFrEF]  - per echo at [facility], EF 35% with severely decreased LF systolic function  - continue to assess volume status and diurese if needed clinically  - follow up CXR to assess for pulmonary edema       #GI  - DASH/CC diet when off BiPAP    #ID  - patient treated for aspiration pneumonia with vancomycin and zosyn but unclear for what duration  - patient afebrile with no leukocytosis and no signs of PNA at this time  - per [facility] records, blood cultures repeated on 1/8 with NGTD  - CXR pending, will monitor off abx for now and start if clinically warranted    #Renal  - monitor electrolytes carefully  - patient appears to be at baseline Cr of 0.85    #Heme  - no acute issues    #Endo  - patient with known T2DM, on levemir 35U BID at home  - was having problems with hypoglycemia at OSH  - will given 15U lantus tonight and place on moderate sliding scale  - uptitrate insulin as needed     #Skin  - Pressure ulcer.  Wound consult     #DVT PPx  - heparin subq    #Ethics  - patient with completed MOLST form in chart stating patient is DNR/DNI however form was rescinded on 1/9 by daughter for cath? Need to follow up with family about goals

## 2019-01-12 NOTE — PROGRESS NOTE ADULT - SUBJECTIVE AND OBJECTIVE BOX
Admission date:  CHIEF COMPLAINT:  HPI:  77 y/o male with PMHx of CAD with s/p CABG 4v in , multiple stents, HTN, PUD, Gout, DM2 presented to [facility] on Dec 31st after unwitnessed fall. According to family, patient lives alone and they believe they found him on the floor on the second day. Patient got admitted to [facility] on telemetry floor to r/o mechanical vs Arrhythmogenic syncope. While admitted on the floor patient had elevated troponin  followed by V. Fib arrest with full code, returned to rhythm after 20 minutes of CPR,  intubated and moved to ICU. Patient got self extubated two days ago and was put on BiPAP, which is tolerating well. Patient at present is oriented to self and immediate family, but unaware of the time and situation. Patient had an Echo done on  with EF of 60% and repeat TTE on  showed EF of30-35%. Was diagnosed with septic shock and was on pressors and antibiotics ( course completed as per documents) Patient had  CHF and aspiration PNA and elevated troponin during the hospital stay. Transferred to [facility] for cardiac evaluation; cardiac cath and possible AICD placement. On BiPAP with 35% with saturation of 100%. Patient is DNR but rescinded for cath.  CT chest on :mils b/l pleural effusion with adjacent atelectasis. Right mid lung ground glass opacity  GFR 58 from , Creat: 1 from 1/10, H/H: 10.3/33.8, PLT: 127  Patient has +3 edema on the Left UE and un stageable Pressure ulcer on the sacrum (2019 14:11)    INTERVAL HISTORY:    REVIEW OF SYSTEMS: Denies xxxxxx; all others negative    MEDICATIONS  (STANDING):  amiodarone    Tablet 200 milliGRAM(s) Oral daily  aspirin enteric coated 81 milliGRAM(s) Oral daily  atorvastatin 40 milliGRAM(s) Oral at bedtime  chlorhexidine 4% Liquid 1 Application(s) Topical <User Schedule>  cholecalciferol 2000 Unit(s) Oral daily  clopidogrel Tablet 75 milliGRAM(s) Oral daily  dextrose 5%. 1000 milliLiter(s) (50 mL/Hr) IV Continuous <Continuous>  dextrose 50% Injectable 12.5 Gram(s) IV Push once  dextrose 50% Injectable 25 Gram(s) IV Push once  dextrose 50% Injectable 25 Gram(s) IV Push once  furosemide   Injectable 40 milliGRAM(s) IV Push daily  heparin  Injectable 5000 Unit(s) SubCutaneous every 8 hours  insulin lispro (HumaLOG) corrective regimen sliding scale   SubCutaneous three times a day before meals  insulin lispro (HumaLOG) corrective regimen sliding scale   SubCutaneous at bedtime  metoprolol succinate ER 50 milliGRAM(s) Oral daily  nystatin    Suspension 560153 Unit(s) Oral four times a day  pantoprazole    Tablet 40 milliGRAM(s) Oral before breakfast    MEDICATIONS  (PRN):  dextrose 40% Gel 15 Gram(s) Oral once PRN Blood Glucose LESS THAN 70 milliGRAM(s)/deciliter  glucagon  Injectable 1 milliGRAM(s) IntraMuscular once PRN Glucose LESS THAN 70 milligrams/deciliter      Objective:  ICU Vital Signs Last 24 Hrs  T(C): 36.6 (2019 05:00), Max: 36.8 (2019 21:15)  T(F): 97.9 (2019 05:00), Max: 98.2 (2019 21:15)  HR: 72 (2019 06:00) (63 - 80)  BP: 149/65 (2019 06:00) (109/55 - 172/83)  BP(mean): 104 (2019 06:00) (75 - 117)  ABP: --  ABP(mean): --  RR: 20 (2019 06:00) (12 - 20)  SpO2: 98% (2019 06:00) (97% - 100%)      Adult Advanced Hemodynamics Last 24 Hrs  CVP(mm Hg): --  CVP(cm H2O): --  CO: --  CI: --  PA: --  PA(mean): --  PCWP: --  SVR: --  SVRI: --  PVR: --  PVRI: --       @ 07:01  -  12 @ 07:00  --------------------------------------------------------  IN: 100 mL / OUT: 2650 mL / NET: -2550 mL      Daily Height in cm: 170.18 (2019 14:11)    Daily Weight in k.8 (2019 05:00)    PHYSICAL EXAM:      Constitutional:    HEENT:    Respiratory:    Cardiovascular:    Gastrointestinal:    Genitourinary:    Extremities:    Vascular:    Neurological:    Skin:      TELEMETRY:     EKG:     IMAGING:    Labs:  ABG - ( 2019 20:41 )  pH, Arterial: 7.50  pH, Blood: x     /  pCO2: 40    /  pO2: 160   / HCO3: 31    / Base Excess: 7.8   /  SaO2: 99                                      11.8   6.2   )-----------( 130      ( 2019 03:24 )             35.6     12    147<H>  |  105  |  24<H>  ----------------------------<  200<H>  4.1   |  29  |  0.93    Ca    8.7      2019 03:24  Phos  3.9       Mg     2.1         TPro  6.0  /  Alb  3.1<L>  /  TBili  0.6  /  DBili  x   /  AST  19  /  ALT  51<H>  /  AlkPhos  71  12    LIVER FUNCTIONS - ( 2019 03:24 )  Alb: 3.1 g/dL / Pro: 6.0 g/dL / ALK PHOS: 71 U/L / ALT: 51 U/L / AST: 19 U/L / GGT: x           PT/INR - ( 2019 21:29 )   PT: 13.4 sec;   INR: 1.17 ratio         PTT - ( 2019 21:29 )  PTT:27.2 sec  CKMB Units: 2.7 ng/mL ( @ 20:46)  Creatine Kinase, Serum: 92 U/L ( @ 20:46)        HEALTH ISSUES - PROBLEM Dx: Admission date:  CHIEF COMPLAINT:  HPI:  77 y/o male with PMHx of CAD with s/p CABG 4v in , multiple stents, HTN, PUD, Gout, DM2 presented to [facility] on Dec 31st after unwitnessed fall. According to family, patient lives alone and they believe they found him on the floor on the second day. Patient got admitted to [facility] on telemetry floor to r/o mechanical vs Arrhythmogenic syncope. While admitted on the floor patient had elevated troponin  followed by V. Fib arrest with full code, returned to rhythm after 20 minutes of CPR,  intubated and moved to ICU. Patient got self extubated two days ago and was put on BiPAP, which is tolerating well. Patient at present is oriented to self and immediate family, but unaware of the time and situation. Patient had an Echo done on  with EF of 60% and repeat TTE on  showed EF of30-35%. Was diagnosed with septic shock and was on pressors and antibiotics ( course completed as per documents) Patient had  CHF and aspiration PNA and elevated troponin during the hospital stay. Transferred to [facility] for cardiac evaluation; cardiac cath and possible AICD placement. On BiPAP with 35% with saturation of 100%. Patient is DNR but rescinded for cath.  CT chest on :mils b/l pleural effusion with adjacent atelectasis. Right mid lung ground glass opacity  GFR 58 from , Creat: 1 from 1/10, H/H: 10.3/33.8, PLT: 127  Patient has +3 edema on the Left UE and un stageable Pressure ulcer on the sacrum (2019 14:11)    INTERVAL HISTORY: Patient arrived to the CCU floor s/p cardiac catheterization which found 95% LCx in stent thrombosis and 95% stenosis to the RPDA. Patient was transferred to the CCU for monitoring and optimization prior to further intervention.     REVIEW OF SYSTEMS:    CONSTITUTIONAL: No weakness, fevers or chills  EYES/ENT: No visual changes;  No vertigo or throat pain   NECK: No pain or stiffness  RESPIRATORY: No cough, wheezing, hemoptysis; No shortness of breath  CARDIOVASCULAR: No chest pain or palpitations  GASTROINTESTINAL: No abdominal or epigastric pain. No nausea, vomiting, or hematemesis; No diarrhea or constipation. No melena or hematochezia.  GENITOURINARY: No dysuria, frequency or hematuria  NEUROLOGICAL: No numbness or weakness  SKIN: No itching, rashes      MEDICATIONS  (STANDING):  amiodarone    Tablet 200 milliGRAM(s) Oral daily  aspirin enteric coated 81 milliGRAM(s) Oral daily  atorvastatin 40 milliGRAM(s) Oral at bedtime  chlorhexidine 4% Liquid 1 Application(s) Topical <User Schedule>  cholecalciferol 2000 Unit(s) Oral daily  clopidogrel Tablet 75 milliGRAM(s) Oral daily  dextrose 5%. 1000 milliLiter(s) (50 mL/Hr) IV Continuous <Continuous>  dextrose 50% Injectable 12.5 Gram(s) IV Push once  dextrose 50% Injectable 25 Gram(s) IV Push once  dextrose 50% Injectable 25 Gram(s) IV Push once  furosemide   Injectable 40 milliGRAM(s) IV Push daily  heparin  Injectable 5000 Unit(s) SubCutaneous every 8 hours  insulin lispro (HumaLOG) corrective regimen sliding scale   SubCutaneous three times a day before meals  insulin lispro (HumaLOG) corrective regimen sliding scale   SubCutaneous at bedtime  metoprolol succinate ER 50 milliGRAM(s) Oral daily  nystatin    Suspension [id number] Unit(s) Oral four times a day  pantoprazole    Tablet 40 milliGRAM(s) Oral before breakfast    MEDICATIONS  (PRN):  dextrose 40% Gel 15 Gram(s) Oral once PRN Blood Glucose LESS THAN 70 milliGRAM(s)/deciliter  glucagon  Injectable 1 milliGRAM(s) IntraMuscular once PRN Glucose LESS THAN 70 milligrams/deciliter      Objective:  ICU Vital Signs Last 24 Hrs  T(C): 36.6 (2019 05:00), Max: 36.8 (2019 21:15)  T(F): 97.9 (2019 05:00), Max: 98.2 (2019 21:15)  HR: 72 (2019 06:00) (63 - 80)  BP: 149/65 (2019 06:00) (109/55 - 172/83)  BP(mean): 104 (2019 06:00) (75 - 117)  ABP: --  ABP(mean): --  RR: 20 (2019 06:00) (12 - 20)  SpO2: 98% (2019 06:00) (97% - 100%)      Adult Advanced Hemodynamics Last 24 Hrs     @ 07:  -   @ 07:00  --------------------------------------------------------  IN: 100 mL / OUT: 2650 mL / NET: -2550 mL      Daily Height in cm: 170.18 (2019 14:11)    Daily Weight in k.8 (2019 05:00)    PHYSICAL EXAM:  PHYSICAL EXAMINATION:  General: WN/WD NAD  HEENT: PERRLA, EOMI, moist mucous membranes  Neurology: A&Ox1-2, nonfocal,  Respiratory: Crackles b/l./  CV: RRR, S1S2, no murmurs, rubs or gallops  Abdominal: Soft, NT, ND +BS, Last BM  Extremities: +2 edema bilaterally. + peripheral pulses  Skin: Pressure ulcer      TELEMETRY:     EKG:     IMAGING:    Labs:  ABG - ( 2019 20:41 )  pH, Arterial: 7.50  pH, Blood: x     /  pCO2: 40    /  pO2: 160   / HCO3: 31    / Base Excess: 7.8   /  SaO2: 99                                11.8   6.2   )-----------( 130      ( 2019 03:24 )             35.6     12    147<H>  |  105  |  24<H>  ----------------------------<  200<H>  4.1   |  29  |  0.93    Ca    8.7      2019 03:24  Phos  3.9     12  Mg     2.1     12    TPro  6.0  /  Alb  3.1<L>  /  TBili  0.6  /  DBili  x   /  AST  19  /  ALT  51<H>  /  AlkPhos  71  12    LIVER FUNCTIONS - ( 2019 03:24 )  Alb: 3.1 g/dL / Pro: 6.0 g/dL / ALK PHOS: 71 U/L / ALT: 51 U/L / AST: 19 U/L / GGT: x           PT/INR - ( 2019 21:29 )   PT: 13.4 sec;   INR: 1.17 ratio         PTT - ( 2019 21:29 )  PTT:27.2 sec  CKMB Units: 2.7 ng/mL ( @ 20:46)  Creatine Kinase, Serum: 92 U/L ( @ 20:46)        HEALTH ISSUES - PROBLEM Dx:

## 2019-01-12 NOTE — PROGRESS NOTE ADULT - ATTENDING COMMENTS
Continue medical management including dual antiplatelet therapy, high intensity statin, beta-blocker, and antiarrhythmic.  Plan for revascularization when medically optimized.

## 2019-01-12 NOTE — CHART NOTE - NSCHARTNOTEFT_GEN_A_CORE
CCU Transfer Note    Transfer from: CCU    Transfer to:  (  x ) ______________    Accepting Physician:    Signout given to:     CCU COURSE:  77 y/o male with PMHx of CAD with s/p CABG 4v in 1997, multiple stents, HTN, PUD, Gout, DM2 presented to [facility] on Dec 31st after unwitnessed fall. According to family, patient lives alone and they believe they found him on the floor on the second day. Patient got admitted to [facility] on telemetry floor to r/o mechanical vs Arrhythmogenic syncope. While admitted on the floor patient had elevated troponin  followed by V. Fib arrest with full code, returned to rhythm after 20 minutes of CPR,  intubated and moved to ICU. Patient got self extubated two days ago and was put on BiPAP, which is tolerating well. Patient at present is oriented to self and immediate family, but unaware of the time and situation. Patient had an Echo done on 12/31 with EF of 60% and repeat TTE on 1/2 showed EF of30-35%. Was diagnosed with septic shock and was on pressors and antibiotics ( course completed as per documents) Patient had  CHF and aspiration PNA and elevated troponin during the hospital stay. Transferred to [facility] for cardiac evaluation; cardiac cath and possible AICD placement. On BiPAP with 35% with saturation of 100%. Patient is DNR but rescinded for cath.  CT chest on 1/8:mils b/l pleural effusion with adjacent atelectasis. Right mid lung ground glass opacity  GFR 58 from 1/8, Creat: 1 from 1/10, H/H: 10.3/33.8, PLT: 127  Patient has +3 edema on the Left UE and un stageable Pressure ulcer on the sacrum       PAST MEDICAL & SURGICAL HISTORY:  MI (myocardial infarction)  HTN (hypertension)  HLD (hyperlipidemia)  DM (diabetes mellitus): A1c 7.1  CAD (coronary artery disease)  S/P primary angioplasty with coronary stent  S/P CABG x 4: 1997 by Dr. Smith      Vital Signs Last 24 Hrs  T(C): 36.8 (12 Jan 2019 08:00), Max: 36.8 (11 Jan 2019 21:15)  T(F): 98.2 (12 Jan 2019 08:00), Max: 98.2 (11 Jan 2019 21:15)  HR: 72 (12 Jan 2019 12:15) (63 - 80)  BP: 156/68 (12 Jan 2019 12:15) (109/55 - 172/83)  BP(mean): 98 (12 Jan 2019 12:15) (75 - 117)  RR: 18 (12 Jan 2019 12:15) (12 - 20)  SpO2: 99% (12 Jan 2019 12:15) (97% - 100%)  I&O's Summary    11 Jan 2019 07:01  -  12 Jan 2019 07:00  --------------------------------------------------------  IN: 100 mL / OUT: 2650 mL / NET: -2550 mL    12 Jan 2019 07:01  -  12 Jan 2019 13:51  --------------------------------------------------------  IN: 240 mL / OUT: 600 mL / NET: -360 mL      Allergies    No Known Allergies    Intolerances      MEDICATIONS  (STANDING):  amiodarone    Tablet 200 milliGRAM(s) Oral daily  aspirin enteric coated 81 milliGRAM(s) Oral daily  atorvastatin 40 milliGRAM(s) Oral at bedtime  chlorhexidine 4% Liquid 1 Application(s) Topical <User Schedule>  cholecalciferol 2000 Unit(s) Oral daily  clopidogrel Tablet 75 milliGRAM(s) Oral daily  dextrose 5%. 1000 milliLiter(s) (50 mL/Hr) IV Continuous <Continuous>  dextrose 50% Injectable 12.5 Gram(s) IV Push once  dextrose 50% Injectable 25 Gram(s) IV Push once  dextrose 50% Injectable 25 Gram(s) IV Push once  furosemide   Injectable 40 milliGRAM(s) IV Push daily  heparin  Injectable 5000 Unit(s) SubCutaneous every 8 hours  insulin lispro (HumaLOG) corrective regimen sliding scale   SubCutaneous three times a day before meals  insulin lispro (HumaLOG) corrective regimen sliding scale   SubCutaneous at bedtime  metoprolol succinate ER 50 milliGRAM(s) Oral daily  nystatin    Suspension 027491 Unit(s) Oral four times a day  pantoprazole    Tablet 40 milliGRAM(s) Oral before breakfast    MEDICATIONS  (PRN):  dextrose 40% Gel 15 Gram(s) Oral once PRN Blood Glucose LESS THAN 70 milliGRAM(s)/deciliter  glucagon  Injectable 1 milliGRAM(s) IntraMuscular once PRN Glucose LESS THAN 70 milligrams/deciliter      Adult Advanced Hemodynamics Last 24 Hrs      CARDIAC MARKERS ( 11 Jan 2019 20:46 )  x     / x     / 92 U/L / x     / 2.7 ng/mL                            11.8   6.2   )-----------( 130      ( 12 Jan 2019 03:24 )             35.6     01-12    147<H>  |  105  |  24<H>  ----------------------------<  200<H>  4.1   |  29  |  0.93    Ca    8.7      12 Jan 2019 03:24  Phos  3.9     01-12  Mg     2.1     01-12    TPro  6.0  /  Alb  3.1<L>  /  TBili  0.6  /  DBili  x   /  AST  19  /  ALT  51<H>  /  AlkPhos  71  01-12    PT/INR - ( 11 Jan 2019 21:29 )   PT: 13.4 sec;   INR: 1.17 ratio         PTT - ( 11 Jan 2019 21:29 )  PTT:27.2 sec    PHYSICAL EXAM:  General: WN/WD NAD  HEENT: PERRLA, EOMI, moist mucous membranes  Neurology: A&Ox1-2, nonfocal,  Respiratory: Crackles b/l./  CV: RRR, S1S2, no murmurs, rubs or gallops  Abdominal: Soft, NT, ND +BS, Last BM  Extremities: +2 edema bilaterally. + peripheral pulses  Skin: Pressure ulcer          ABG - ( 12 Jan 2019 10:02 )  pH, Arterial: 7.47  pH, Blood: x     /  pCO2: 48    /  pO2: 103   / HCO3: 35    / Base Excess: 10.3  /  SaO2: 98        Lactate:    Ekg:  < from: 12 Lead ECG (01.12.19 @ 08:07) >    Ventricular Rate 72 BPM    Atrial Rate 72 BPM    P-R Interval 154 ms    QRS Duration 142 ms    Q-T Interval 454 ms    QTC Calculation(Bezet) 497 ms    P Axis -33 degrees    R Axis -12 degrees    T Axis 24 degrees    Diagnosis Line NORMAL SINUS RHYTHM    RIGHT BUNDLE BRANCH BLOCK  INFERIOR INFARCT , AGE UNDETERMINED      Telemetry:  Echo:    Cardiac Cath:  Stress Test:  Imaging:  < from: Xray Chest 1 View- PORTABLE-Urgent (01.11.19 @ 22:32) >    FINDINGS/  IMPRESSION: Right defibrillator pad is present.    Moderate bilateral effusions and pulmonary edema. No pneumothorax.     Sternotomy. The cardiac silhouette is enlarged.      ASSESSMENT & PLAN:     77 y/o male with PMHx of CAD with s/p CABG 4v in 1997, multiple stents, HTN, PUD, Gout, DM2, and Acute HFrEF (EF 30-35%) who presented to [facility] on Dec 31st after an unwitnessed fall. Admitted to [facility] on telemetry floor to r/o mechanical vs Arrhythmogenic syncope, w/ elevated trops. Hospital course c/b V. Fib arrest ROSC ~ 20 minutes of CPR, intubated and moved to ICU. Patient self extubated on 1/9 and was continued on BiPAP. His hospital course was complicated by NSTEMI, Sepsis 2/2 aspiration pneumonia and CHF. He was transferred to [facility] for cardiac evaluation; cardiac cath and possible AICD placement. He is now s/p [facility] with multi vessel disease.       #Neuro  - AxO1-2     #Resp  - patient was intubated after Vfib arrest and self extubated himself on 1/9, has been using BiPAP since  - in CCU, weaned off BiPAP onto NC and sating well  - was treated for aspiration PNA with vancomycin and zosyn, no signs of infection at this time  - will monitor off abx     #CV  [NSTEMI with VT arrest]  - patient s/p VT arrest with ROSC  - s/p amio gtt and now on PO amio 200mg daily   - continue ASA and plavix  - s/p [facility] showing 95% disease in LCx and 95% stenosis to the RPDA with plans for intervention after diuresis and optimization of respiratory status   - continue lisinopril 10mg daily and toprol 50mg daily  - continue statin    [HFrEF]  - per echo at [facility], EF 35% with severely decreased LF systolic function  - continue to assess volume status and diurese if needed clinically  - follow up CXR to assess for pulmonary edema       #GI  - DASH/CC diet when off BiPAP    #ID  - patient treated for aspiration pneumonia with vancomycin and zosyn but unclear for what duration  - patient afebrile with no leukocytosis and no signs of PNA at this time  - per [facility] records, blood cultures repeated on 1/8 with NGTD  - CXR pending, will monitor off abx for now and start if clinically warranted    #Renal  - monitor electrolytes carefully  - patient appears to be at baseline Cr of 0.85    #Heme  - no acute issues    #Endo  - patient with known T2DM, on levemir 35U BID at home  - was having problems with hypoglycemia at OSH  - will given 15U lantus tonight and place on moderate sliding scale  - uptitrate insulin as needed     #Skin  - Pressure ulcer.  Wound consult     #DVT PPx  - heparin subq    #Ethics  - patient with completed MOLST form in chart stating patient is DNR/DNI however form was rescinded on 1/9 by daughter for cath? Need to follow up with family about goals     FOR FOLLOW UP:  1/12: sating well on NC, monitoring off abx ax there are no clinical signs of infection, started nystatin for thursh - 95% stenosis LCx and RPDA with no intervention. Plan to optimize prior to intervention. Pulm edema on cxr - got 40 lasix with diuresis. CCU Transfer Note    Transfer from: CCU    Transfer to:  (  x ) __[id number]____    Accepting Physician: Jennifer Khan MD    Signout given to: Jennifer Khan MD; [facility] Medicine NP      CCU COURSE:  79 y/o male with PMHx of CAD with s/p CABG 4v in 1997, multiple stents, HTN, PUD, Gout, DM2 presented to [facility] on Dec 31st after unwitnessed fall. According to family, patient lives alone and they believe they found him on the floor on the second day. Patient got admitted to [facility] on telemetry floor to r/o mechanical vs Arrhythmogenic syncope. While admitted on the floor patient had elevated troponin  followed by V. Fib arrest with full code, returned to rhythm after 20 minutes of CPR,  intubated and moved to ICU. Patient got self extubated two days ago and was put on BiPAP, which is tolerating well. Patient at present is oriented to self and immediate family, but unaware of the time and situation. Patient had an Echo done on 12/31 with EF of 60% and repeat TTE on 1/2 showed EF of30-35%. Was diagnosed with septic shock and was on pressors and antibiotics ( course completed as per documents) Patient had  CHF and aspiration PNA and elevated troponin during the hospital stay. Transferred to [facility] for cardiac evaluation; cardiac cath and possible AICD placement. On BiPAP with 35% with saturation of 100%. Patient is DNR but rescinded for cath.  CT chest on 1/8:mils b/l pleural effusion with adjacent atelectasis. Right mid lung ground glass opacity  GFR 58 from 1/8, Creat: 1 from 1/10, H/H: 10.3/33.8, PLT: 127  Patient has +3 edema on the Left UE and un stageable Pressure ulcer on the sacrum       PAST MEDICAL & SURGICAL HISTORY:  MI (myocardial infarction)  HTN (hypertension)  HLD (hyperlipidemia)  DM (diabetes mellitus): A1c 7.1  CAD (coronary artery disease)  S/P primary angioplasty with coronary stent  S/P CABG x 4: 1997 by Dr. Smith      Vital Signs Last 24 Hrs  T(C): 36.8 (12 Jan 2019 08:00), Max: 36.8 (11 Jan 2019 21:15)  T(F): 98.2 (12 Jan 2019 08:00), Max: 98.2 (11 Jan 2019 21:15)  HR: 72 (12 Jan 2019 12:15) (63 - 80)  BP: 156/68 (12 Jan 2019 12:15) (109/55 - 172/83)  BP(mean): 98 (12 Jan 2019 12:15) (75 - 117)  RR: 18 (12 Jan 2019 12:15) (12 - 20)  SpO2: 99% (12 Jan 2019 12:15) (97% - 100%)  I&O's Summary    11 Jan 2019 07:01  -  12 Jan 2019 07:00  --------------------------------------------------------  IN: 100 mL / OUT: 2650 mL / NET: -2550 mL    12 Jan 2019 07:01  -  12 Jan 2019 13:51  --------------------------------------------------------  IN: 240 mL / OUT: 600 mL / NET: -360 mL      Allergies    No Known Allergies    Intolerances      MEDICATIONS  (STANDING):  amiodarone    Tablet 200 milliGRAM(s) Oral daily  aspirin enteric coated 81 milliGRAM(s) Oral daily  atorvastatin 40 milliGRAM(s) Oral at bedtime  chlorhexidine 4% Liquid 1 Application(s) Topical <User Schedule>  cholecalciferol 2000 Unit(s) Oral daily  clopidogrel Tablet 75 milliGRAM(s) Oral daily  dextrose 5%. 1000 milliLiter(s) (50 mL/Hr) IV Continuous <Continuous>  dextrose 50% Injectable 12.5 Gram(s) IV Push once  dextrose 50% Injectable 25 Gram(s) IV Push once  dextrose 50% Injectable 25 Gram(s) IV Push once  furosemide   Injectable 40 milliGRAM(s) IV Push daily  heparin  Injectable 5000 Unit(s) SubCutaneous every 8 hours  insulin lispro (HumaLOG) corrective regimen sliding scale   SubCutaneous three times a day before meals  insulin lispro (HumaLOG) corrective regimen sliding scale   SubCutaneous at bedtime  metoprolol succinate ER 50 milliGRAM(s) Oral daily  nystatin    Suspension 684909 Unit(s) Oral four times a day  pantoprazole    Tablet 40 milliGRAM(s) Oral before breakfast    MEDICATIONS  (PRN):  dextrose 40% Gel 15 Gram(s) Oral once PRN Blood Glucose LESS THAN 70 milliGRAM(s)/deciliter  glucagon  Injectable 1 milliGRAM(s) IntraMuscular once PRN Glucose LESS THAN 70 milligrams/deciliter      Adult Advanced Hemodynamics Last 24 Hrs      CARDIAC MARKERS ( 11 Jan 2019 20:46 )  x     / x     / 92 U/L / x     / 2.7 ng/mL                            11.8   6.2   )-----------( 130      ( 12 Jan 2019 03:24 )             35.6     01-12    147<H>  |  105  |  24<H>  ----------------------------<  200<H>  4.1   |  29  |  0.93    Ca    8.7      12 Jan 2019 03:24  Phos  3.9     01-12  Mg     2.1     01-12    TPro  6.0  /  Alb  3.1<L>  /  TBili  0.6  /  DBili  x   /  AST  19  /  ALT  51<H>  /  AlkPhos  71  01-12    PT/INR - ( 11 Jan 2019 21:29 )   PT: 13.4 sec;   INR: 1.17 ratio         PTT - ( 11 Jan 2019 21:29 )  PTT:27.2 sec    PHYSICAL EXAM:  General: WN/WD NAD  HEENT: PERRLA, EOMI, moist mucous membranes  Neurology: A&Ox1-2, nonfocal,  Respiratory: Crackles b/l./  CV: RRR, S1S2, no murmurs, rubs or gallops  Abdominal: Soft, NT, ND +BS, Last BM  Extremities: +2 edema bilaterally. + peripheral pulses  Skin: Pressure ulcer          ABG - ( 12 Jan 2019 10:02 )  pH, Arterial: 7.47  pH, Blood: x     /  pCO2: 48    /  pO2: 103   / HCO3: 35    / Base Excess: 10.3  /  SaO2: 98        Lactate:    Ekg:  < from: 12 Lead ECG (01.12.19 @ 08:07) >    Ventricular Rate 72 BPM    Atrial Rate 72 BPM    P-R Interval 154 ms    QRS Duration 142 ms    Q-T Interval 454 ms    QTC Calculation(Bezet) 497 ms    P Axis -33 degrees    R Axis -12 degrees    T Axis 24 degrees    Diagnosis Line NORMAL SINUS RHYTHM    RIGHT BUNDLE BRANCH BLOCK  INFERIOR INFARCT , AGE UNDETERMINED      Telemetry:  Echo:    Cardiac Cath:  Stress Test:  Imaging:  < from: Xray Chest 1 View- PORTABLE-Urgent (01.11.19 @ 22:32) >    FINDINGS/  IMPRESSION: Right defibrillator pad is present.    Moderate bilateral effusions and pulmonary edema. No pneumothorax.     Sternotomy. The cardiac silhouette is enlarged.      ASSESSMENT & PLAN:     79 y/o male with PMHx of CAD with s/p CABG 4v in 1997, multiple stents, HTN, PUD, Gout, DM2, and Acute HFrEF (EF 30-35%) who presented to [facility] on Dec 31st after an unwitnessed fall. Admitted to [facility] on telemetry floor to r/o mechanical vs Arrhythmogenic syncope, w/ elevated trops. Hospital course c/b V. Fib arrest ROSC ~ 20 minutes of CPR, intubated and moved to ICU. Patient self extubated on 1/9 and was continued on BiPAP. His hospital course was complicated by NSTEMI, Sepsis 2/2 aspiration pneumonia and CHF. He was transferred to [facility] for cardiac evaluation; cardiac cath and possible AICD placement. He is now s/p [facility] with multi vessel disease.       #Neuro  - AxO1-2     #Resp  - patient was intubated after Vfib arrest and self extubated himself on 1/9, has been using BiPAP since  - in CCU, weaned off BiPAP onto NC and sating well  - was treated for aspiration PNA with vancomycin and zosyn, no signs of infection at this time  - will monitor off abx     #CV  [NSTEMI with VT arrest]  - patient s/p VT arrest with ROSC  - s/p amio gtt and now on PO amio 200mg daily   - continue ASA and plavix  - s/p [facility] showing 95% disease in LCx and 95% stenosis to the RPDA with plans for intervention after diuresis and optimization of respiratory status   - continue lisinopril 10mg daily and toprol 50mg daily  - continue statin    [HFrEF]  - per echo at [facility], EF 35% with severely decreased LF systolic function  - continue to assess volume status and diurese if needed clinically  - follow up CXR to assess for pulmonary edema       #GI  - DASH/CC diet when off BiPAP    #ID  - patient treated for aspiration pneumonia with vancomycin and zosyn but unclear for what duration  - patient afebrile with no leukocytosis and no signs of PNA at this time  - per [facility] records, blood cultures repeated on 1/8 with NGTD  - CXR pending, will monitor off abx for now and start if clinically warranted    #Renal  - monitor electrolytes carefully  - patient appears to be at baseline Cr of 0.85    #Heme  - no acute issues    #Endo  - patient with known T2DM, on levemir 35U BID at home  - was having problems with hypoglycemia at OSH  - will given 15U lantus tonight and place on moderate sliding scale  - uptitrate insulin as needed     #Skin  - Pressure ulcer.  Wound consult     #DVT PPx  - heparin subq    #Ethics  - patient with completed MOLST form in chart stating patient is DNR/DNI however form was rescinded on 1/9 by daughter for cath? Need to follow up with family about goals     FOR FOLLOW UP:  1/12: sating well on NC, monitoring off abx ax there are no clinical signs of infection, started nystatin for thursh - 95% stenosis LCx and RPDA with no intervention. Plan to optimize prior to intervention. Pulm edema on cxr - got 40 lasix with diuresis.

## 2019-01-13 LAB
ANION GAP SERPL CALC-SCNC: 12 MMOL/L — SIGNIFICANT CHANGE UP (ref 5–17)
BUN SERPL-MCNC: 26 MG/DL — HIGH (ref 7–23)
CALCIUM SERPL-MCNC: 8.8 MG/DL — SIGNIFICANT CHANGE UP (ref 8.4–10.5)
CHLORIDE SERPL-SCNC: 104 MMOL/L — SIGNIFICANT CHANGE UP (ref 96–108)
CO2 SERPL-SCNC: 30 MMOL/L — SIGNIFICANT CHANGE UP (ref 22–31)
CREAT SERPL-MCNC: 0.94 MG/DL — SIGNIFICANT CHANGE UP (ref 0.5–1.3)
FOLATE SERPL-MCNC: 13.2 NG/ML — SIGNIFICANT CHANGE UP
GLUCOSE BLDC GLUCOMTR-MCNC: 163 MG/DL — HIGH (ref 70–99)
GLUCOSE BLDC GLUCOMTR-MCNC: 172 MG/DL — HIGH (ref 70–99)
GLUCOSE BLDC GLUCOMTR-MCNC: 181 MG/DL — HIGH (ref 70–99)
GLUCOSE BLDC GLUCOMTR-MCNC: 195 MG/DL — HIGH (ref 70–99)
GLUCOSE SERPL-MCNC: 165 MG/DL — HIGH (ref 70–99)
HCT VFR BLD CALC: 35.4 % — LOW (ref 39–50)
HGB BLD-MCNC: 11.8 G/DL — LOW (ref 13–17)
MCHC RBC-ENTMCNC: 33.3 GM/DL — SIGNIFICANT CHANGE UP (ref 32–36)
MCHC RBC-ENTMCNC: 33.4 PG — SIGNIFICANT CHANGE UP (ref 27–34)
MCV RBC AUTO: 100 FL — SIGNIFICANT CHANGE UP (ref 80–100)
PLATELET # BLD AUTO: 136 K/UL — LOW (ref 150–400)
POTASSIUM SERPL-MCNC: 3.7 MMOL/L — SIGNIFICANT CHANGE UP (ref 3.5–5.3)
POTASSIUM SERPL-SCNC: 3.7 MMOL/L — SIGNIFICANT CHANGE UP (ref 3.5–5.3)
RBC # BLD: 3.53 M/UL — LOW (ref 4.2–5.8)
RBC # FLD: 13 % — SIGNIFICANT CHANGE UP (ref 10.3–14.5)
SODIUM SERPL-SCNC: 146 MMOL/L — HIGH (ref 135–145)
VIT B12 SERPL-MCNC: 1591 PG/ML — HIGH (ref 232–1245)
WBC # BLD: 6.6 K/UL — SIGNIFICANT CHANGE UP (ref 3.8–10.5)
WBC # FLD AUTO: 6.6 K/UL — SIGNIFICANT CHANGE UP (ref 3.8–10.5)

## 2019-01-13 PROCEDURE — 99232 SBSQ HOSP IP/OBS MODERATE 35: CPT | Mod: GC

## 2019-01-13 PROCEDURE — 99233 SBSQ HOSP IP/OBS HIGH 50: CPT | Mod: GC

## 2019-01-13 RX ADMIN — HEPARIN SODIUM 5000 UNIT(S): 5000 INJECTION INTRAVENOUS; SUBCUTANEOUS at 05:39

## 2019-01-13 RX ADMIN — Medication 2: at 13:00

## 2019-01-13 RX ADMIN — PANTOPRAZOLE SODIUM 40 MILLIGRAM(S): 20 TABLET, DELAYED RELEASE ORAL at 05:37

## 2019-01-13 RX ADMIN — Medication 50 MILLIGRAM(S): at 05:37

## 2019-01-13 RX ADMIN — HEPARIN SODIUM 5000 UNIT(S): 5000 INJECTION INTRAVENOUS; SUBCUTANEOUS at 13:00

## 2019-01-13 RX ADMIN — HEPARIN SODIUM 5000 UNIT(S): 5000 INJECTION INTRAVENOUS; SUBCUTANEOUS at 22:36

## 2019-01-13 RX ADMIN — Medication 500000 UNIT(S): at 18:33

## 2019-01-13 RX ADMIN — ATORVASTATIN CALCIUM 40 MILLIGRAM(S): 80 TABLET, FILM COATED ORAL at 22:35

## 2019-01-13 RX ADMIN — Medication 2: at 09:16

## 2019-01-13 RX ADMIN — Medication 40 MILLIGRAM(S): at 05:37

## 2019-01-13 RX ADMIN — AMIODARONE HYDROCHLORIDE 200 MILLIGRAM(S): 400 TABLET ORAL at 05:37

## 2019-01-13 RX ADMIN — Medication 2: at 18:33

## 2019-01-13 RX ADMIN — Medication 500000 UNIT(S): at 13:01

## 2019-01-13 RX ADMIN — CLOPIDOGREL BISULFATE 75 MILLIGRAM(S): 75 TABLET, FILM COATED ORAL at 09:49

## 2019-01-13 RX ADMIN — Medication 81 MILLIGRAM(S): at 09:49

## 2019-01-13 RX ADMIN — Medication 500000 UNIT(S): at 00:05

## 2019-01-13 RX ADMIN — Medication 500000 UNIT(S): at 05:37

## 2019-01-13 RX ADMIN — Medication 2000 UNIT(S): at 09:49

## 2019-01-13 RX ADMIN — CHLORHEXIDINE GLUCONATE 1 APPLICATION(S): 213 SOLUTION TOPICAL at 10:55

## 2019-01-13 NOTE — PROGRESS NOTE ADULT - SUBJECTIVE AND OBJECTIVE BOX
Patient is a 78y old  Male who presents with a chief complaint of NSTEMI (12 Jan 2019 16:30)      SUBJECTIVE / OVERNIGHT EVENTS:    MEDICATIONS  (STANDING):  amiodarone    Tablet 200 milliGRAM(s) Oral daily  aspirin enteric coated 81 milliGRAM(s) Oral daily  atorvastatin 40 milliGRAM(s) Oral at bedtime  chlorhexidine 4% Liquid 1 Application(s) Topical <User Schedule>  cholecalciferol 2000 Unit(s) Oral daily  clopidogrel Tablet 75 milliGRAM(s) Oral daily  dextrose 5%. 1000 milliLiter(s) (50 mL/Hr) IV Continuous <Continuous>  dextrose 50% Injectable 12.5 Gram(s) IV Push once  dextrose 50% Injectable 25 Gram(s) IV Push once  dextrose 50% Injectable 25 Gram(s) IV Push once  furosemide   Injectable 40 milliGRAM(s) IV Push daily  heparin  Injectable 5000 Unit(s) SubCutaneous every 8 hours  insulin lispro (HumaLOG) corrective regimen sliding scale   SubCutaneous three times a day before meals  insulin lispro (HumaLOG) corrective regimen sliding scale   SubCutaneous at bedtime  metoprolol succinate ER 50 milliGRAM(s) Oral daily  nystatin    Suspension 835121 Unit(s) Oral four times a day  pantoprazole    Tablet 40 milliGRAM(s) Oral before breakfast    MEDICATIONS  (PRN):  dextrose 40% Gel 15 Gram(s) Oral once PRN Blood Glucose LESS THAN 70 milliGRAM(s)/deciliter  glucagon  Injectable 1 milliGRAM(s) IntraMuscular once PRN Glucose LESS THAN 70 milligrams/deciliter      Vital Signs Last 24 Hrs  T(C): 36.4 (13 Jan 2019 12:27), Max: 36.8 (12 Jan 2019 16:22)  T(F): 97.6 (13 Jan 2019 12:27), Max: 98.3 (12 Jan 2019 16:22)  HR: 78 (13 Jan 2019 12:27) (68 - 78)  BP: 153/77 (13 Jan 2019 12:27) (108/49 - 158/74)  BP(mean): 79 (12 Jan 2019 15:00) (76 - 79)  RR: 17 (13 Jan 2019 12:27) (16 - 19)  SpO2: 95% (13 Jan 2019 12:27) (93% - 98%)  CAPILLARY BLOOD GLUCOSE      POCT Blood Glucose.: 181 mg/dL (13 Jan 2019 12:55)  POCT Blood Glucose.: 163 mg/dL (13 Jan 2019 09:01)  POCT Blood Glucose.: 173 mg/dL (12 Jan 2019 21:23)  POCT Blood Glucose.: 179 mg/dL (12 Jan 2019 17:54)    I&O's Summary    12 Jan 2019 07:01  -  13 Jan 2019 07:00  --------------------------------------------------------  IN: 240 mL / OUT: 1800 mL / NET: -1560 mL    13 Jan 2019 07:01  -  13 Jan 2019 14:13  --------------------------------------------------------  IN: 240 mL / OUT: 0 mL / NET: 240 mL        PHYSICAL EXAM:  GENERAL: NAD, well-developed  HEAD:  Atraumatic, Normocephalic  EYES: EOMI, PERRLA, conjunctiva and sclera clear  NECK: Supple, No JVD  CHEST/LUNG: Clear to auscultation bilaterally; No wheeze  HEART: Regular rate and rhythm; No murmurs, rubs, or gallops  ABDOMEN: Soft, Nontender, Nondistended; Bowel sounds present  EXTREMITIES:  2+ Peripheral Pulses, No clubbing, cyanosis, or edema  PSYCH: AAOx3  NEUROLOGY: non-focal  SKIN: No rashes or lesions    LABS:                        11.8   6.6   )-----------( 136      ( 13 Jan 2019 06:21 )             35.4     01-13    146<H>  |  104  |  26<H>  ----------------------------<  165<H>  3.7   |  30  |  0.94    Ca    8.8      13 Jan 2019 06:20  Phos  3.9     01-12  Mg     2.1     01-12    TPro  6.0  /  Alb  3.1<L>  /  TBili  0.6  /  DBili  x   /  AST  19  /  ALT  51<H>  /  AlkPhos  71  01-12    PT/INR - ( 11 Jan 2019 21:29 )   PT: 13.4 sec;   INR: 1.17 ratio         PTT - ( 11 Jan 2019 21:29 )  PTT:27.2 sec  CARDIAC MARKERS ( 11 Jan 2019 20:46 )  x     / x     / 92 U/L / x     / 2.7 ng/mL          RADIOLOGY & ADDITIONAL TESTS:    Imaging Personally Reviewed:    Consultant(s) Notes Reviewed:      Care Discussed with Consultants/Other Providers: Patient is a 78y old  Male who presents with a chief complaint of NSTEMI (12 Jan 2019 16:30)      SUBJECTIVE / OVERNIGHT EVENTS: No event. appeared comfortable. No distress     MEDICATIONS  (STANDING):  amiodarone    Tablet 200 milliGRAM(s) Oral daily  aspirin enteric coated 81 milliGRAM(s) Oral daily  atorvastatin 40 milliGRAM(s) Oral at bedtime  chlorhexidine 4% Liquid 1 Application(s) Topical <User Schedule>  cholecalciferol 2000 Unit(s) Oral daily  clopidogrel Tablet 75 milliGRAM(s) Oral daily  dextrose 5%. 1000 milliLiter(s) (50 mL/Hr) IV Continuous <Continuous>  dextrose 50% Injectable 12.5 Gram(s) IV Push once  dextrose 50% Injectable 25 Gram(s) IV Push once  dextrose 50% Injectable 25 Gram(s) IV Push once  furosemide   Injectable 40 milliGRAM(s) IV Push daily  heparin  Injectable 5000 Unit(s) SubCutaneous every 8 hours  insulin lispro (HumaLOG) corrective regimen sliding scale   SubCutaneous three times a day before meals  insulin lispro (HumaLOG) corrective regimen sliding scale   SubCutaneous at bedtime  metoprolol succinate ER 50 milliGRAM(s) Oral daily  nystatin    Suspension 323878 Unit(s) Oral four times a day  pantoprazole    Tablet 40 milliGRAM(s) Oral before breakfast    MEDICATIONS  (PRN):  dextrose 40% Gel 15 Gram(s) Oral once PRN Blood Glucose LESS THAN 70 milliGRAM(s)/deciliter  glucagon  Injectable 1 milliGRAM(s) IntraMuscular once PRN Glucose LESS THAN 70 milligrams/deciliter      Vital Signs Last 24 Hrs  T(C): 36.4 (13 Jan 2019 12:27), Max: 36.8 (12 Jan 2019 16:22)  T(F): 97.6 (13 Jan 2019 12:27), Max: 98.3 (12 Jan 2019 16:22)  HR: 78 (13 Jan 2019 12:27) (68 - 78)  BP: 153/77 (13 Jan 2019 12:27) (108/49 - 158/74)  BP(mean): 79 (12 Jan 2019 15:00) (76 - 79)  RR: 17 (13 Jan 2019 12:27) (16 - 19)  SpO2: 95% (13 Jan 2019 12:27) (93% - 98%)  CAPILLARY BLOOD GLUCOSE      POCT Blood Glucose.: 181 mg/dL (13 Jan 2019 12:55)  POCT Blood Glucose.: 163 mg/dL (13 Jan 2019 09:01)  POCT Blood Glucose.: 173 mg/dL (12 Jan 2019 21:23)  POCT Blood Glucose.: 179 mg/dL (12 Jan 2019 17:54)    I&O's Summary    12 Jan 2019 07:01  -  13 Jan 2019 07:00  --------------------------------------------------------  IN: 240 mL / OUT: 1800 mL / NET: -1560 mL    13 Jan 2019 07:01  -  13 Jan 2019 14:13  --------------------------------------------------------  IN: 240 mL / OUT: 0 mL / NET: 240 mL        PHYSICAL EXAM:  GENERAL: NAD  EYES: Pink conjunctiva and sclera clear  NECK: Supple  CHEST/LUNG: Good air entry b/l basilar crackles   HEART: S1 and S2 RR No murmurs, rubs, or gallops  ABDOMEN: Soft, Nontender, Nondistended; Bowel sounds present  EXTREMITIES:  2+ Peripheral Pulses, No clubbing, cyanosis, or edema  PSYCH: AAOx1    LABS:                        11.8   6.6   )-----------( 136      ( 13 Jan 2019 06:21 )             35.4     01-13    146<H>  |  104  |  26<H>  ----------------------------<  165<H>  3.7   |  30  |  0.94    Ca    8.8      13 Jan 2019 06:20  Phos  3.9     01-12  Mg     2.1     01-12    TPro  6.0  /  Alb  3.1<L>  /  TBili  0.6  /  DBili  x   /  AST  19  /  ALT  51<H>  /  AlkPhos  71  01-12    PT/INR - ( 11 Jan 2019 21:29 )   PT: 13.4 sec;   INR: 1.17 ratio         PTT - ( 11 Jan 2019 21:29 )  PTT:27.2 sec  CARDIAC MARKERS ( 11 Jan 2019 20:46 )  x     / x     / 92 U/L / x     / 2.7 ng/mL          RADIOLOGY & ADDITIONAL TESTS:    Imaging Personally Reviewed:    Consultant(s) Notes Reviewed:      Care Discussed with Consultants/Other Providers:

## 2019-01-13 NOTE — PROGRESS NOTE ADULT - ASSESSMENT
79yo male with hx of CAD with s/p CABG 4v in 1997, multiple stents, HTN, PUD, Gout, DM2, and Acute HFrEF (EF 30-35%), presented to [facility] from [facility] for NSTEMI management after stabilization from complications of V. Fib arrest s/p ROSC after 20mins of CPR and ICU stay complicated by sepsis secondary to Aspiration PNA. Pt s/p Cardiac cath at [facility] revealing multiple vessel disease without any intervention, recommending medical optimization and stabilization prior to PCI in 1 week. Also recommendation for EP eval for AICD placement.

## 2019-01-13 NOTE — DIETITIAN INITIAL EVALUATION ADULT. - ENERGY NEEDS
Ht:67 Wt: 222 pounds BMI: 34.8kg/m2 IBW: 148  pounds(+/-10%)   +1 left arm and hand edema. Unstageable sacrum pressure ulcer documented.

## 2019-01-13 NOTE — PROGRESS NOTE ADULT - PROBLEM SELECTOR PLAN 4
-s/p CABG 4v in 1997  -NSTEMI c/b V Fib arrest s/p ROSC after 20mins of CPR.  -Continue Amiodarone   -Management for NSTEMI as above -s/p CABG  -NSTEMI c/b V Fib arrest s/p ROSC after 20mins of CPR.  -Continue Amiodarone

## 2019-01-13 NOTE — DIETITIAN INITIAL EVALUATION ADULT. - NS AS NUTRI INTERV MEDICAL AND FOOD SUPPLEMENTS
Recommend add Morgan 2 packets/daily to provide an additional 160 Kcal and 28 Gm amino acids to support collagen formation

## 2019-01-13 NOTE — PROGRESS NOTE ADULT - PROBLEM SELECTOR PLAN 5
-Stable Hb at 11 with MCV> 100  -Follow up with B12/Folate/Methylmalonic level -Stable Hb at 11 with MCV> 100  - B12/Folate- ok

## 2019-01-13 NOTE — PROGRESS NOTE ADULT - PROBLEM SELECTOR PLAN 1
-Complicated with V. 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 Unable to intervene at the time  -Recommendations to medically optimize, stabilize pt and plan for PCI early next week  -Continue ASA/Plavix/Metoprolol/Statin  -Follow up with Cardiology -s/p  V. 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   -Optimize medical management and plan for PCI early next week  -Continue ASA/Plavix/Metoprolol/Statin  - Cardiology note appreciated

## 2019-01-13 NOTE — PROGRESS NOTE ADULT - PROBLEM SELECTOR PLAN 2
-Initially required BIPAP. Currently saturating well on RA  -ECHO from OSH revealed EF 30-35%  -Continue Lasix 40mg IV daily  -Daily weights and Strict I/Os  -Follow up with EP recommendations regarding AICD placement  -Continue to monitor respiratory status -Saturating well on RA  -TTE EF 30-35%  -Continue Lasix 40mg IV daily  -Daily weights and Strict I/Os  -Follow up on EP recommendations regarding AICD placement  -Continue to monitor respiratory status

## 2019-01-13 NOTE — DIETITIAN INITIAL EVALUATION ADULT. - OTHER INFO
Nutrition consult received for stage II pressure ulcer. Per chart, pt came from [facility] for cardiac evaluation. Pt seen at bedside. Per RN pt consumed 100% of breakfast this morning. No chewing/swallowing difficulty on current pureed consistency. No GI distress reported.

## 2019-01-13 NOTE — PROGRESS NOTE ADULT - SUBJECTIVE AND OBJECTIVE BOX
Cardiology Progress Note    Interval events: No acute events overnight. Remains AOx1-2. Planned for revascularization next week.    Net neg 1560 cc.    Tele: SR 70-80s    Medications:  amiodarone    Tablet 200 milliGRAM(s) Oral daily  aspirin enteric coated 81 milliGRAM(s) Oral daily  atorvastatin 40 milliGRAM(s) Oral at bedtime  chlorhexidine 4% Liquid 1 Application(s) Topical <User Schedule>  cholecalciferol 2000 Unit(s) Oral daily  clopidogrel Tablet 75 milliGRAM(s) Oral daily  dextrose 40% Gel 15 Gram(s) Oral once PRN  dextrose 5%. 1000 milliLiter(s) IV Continuous <Continuous>  dextrose 50% Injectable 12.5 Gram(s) IV Push once  dextrose 50% Injectable 25 Gram(s) IV Push once  dextrose 50% Injectable 25 Gram(s) IV Push once  furosemide   Injectable 40 milliGRAM(s) IV Push daily  glucagon  Injectable 1 milliGRAM(s) IntraMuscular once PRN  heparin  Injectable 5000 Unit(s) SubCutaneous every 8 hours  insulin lispro (HumaLOG) corrective regimen sliding scale   SubCutaneous three times a day before meals  insulin lispro (HumaLOG) corrective regimen sliding scale   SubCutaneous at bedtime  metoprolol succinate ER 50 milliGRAM(s) Oral daily  nystatin    Suspension 313511 Unit(s) Oral four times a day  pantoprazole    Tablet 40 milliGRAM(s) Oral before breakfast      Review of Systems:  Constitutional: [ ] Fever [ ] Chills [ ] Fatigue [ ] Weight change   HEENT: [ ] Blurred vision [ ] Eye Pain [ ] Headache [ ] Runny nose [ ] Sore Throat   Respiratory: [ ] Cough [ ] Wheezing [ ] Shortness of breath  Cardiovascular: [ ] Chest Pain [ ] Palpitations [ ] SANCHEZ [ ] PND [ ] Orthopnea  Gastrointestinal: [ ] Abdominal Pain [ ] Diarrhea [ ] Constipation [ ] Hemorrhoids [ ] Nausea [ ] Vomiting  Genitourinary: [ ] Nocturia [ ] Dysuria [ ] Incontinence  Extremities: [ ] Swelling [ ] Joint Pain  Neurologic: [ ] Focal deficit [ ] Paresthesias [ ] Syncope  Lymphatic: [ ] Swelling [ ] Lymphadenopathy   Skin: [ ] Rash [ ] Ecchymoses [ ] Wounds [ ] Lesions  Psychiatry: [ ] Depression [ ] Suicidal/Homicidal Ideation [ ] Anxiety [ ] Sleep Disturbances  [ ] 10 point review of systems is otherwise negative except as mentioned above            [ ]Unable to obtain    Vitals:  T(C): 36.6 (19 @ 04:10), Max: 36.8 (19 @ 08:00)  HR: 78 (19 @ 04:10) (64 - 78)  BP: 158/74 (19 @ 04:10) (108/49 - 158/74)  BP(mean): 79 (19 @ 15:00) (76 - 98)  RR: 18 (19 @ 04:10) (10 - 19)  SpO2: 94% (19 @ 04:10) (93% - 100%)  Wt(kg): --  Daily     Daily Weight in k.9 (2019 04:10)  I&O's Summary    2019 07:01  -  2019 07:00  --------------------------------------------------------  IN: 100 mL / OUT: 2650 mL / NET: -2550 mL    2019 07:01  -  2019 06:30  --------------------------------------------------------  IN: 240 mL / OUT: 1800 mL / NET: -1560 mL        Physical Exam:  General: WN/WD NAD; on BiPAP  HEENT: PERRLA, EOMI, moist mucous membranes  Neurology: A&Ox1-2, nonfocal,  Respiratory: Crackles b/l./  CV: RRR, S1S2, no murmurs, rubs or gallops  Abdominal: Soft, NT, ND +BS, Last BM  Extremities: +2 edema bilaterally. + peripheral pulses  Skin: Pressure ulcer      Labs:                        11.8   6.2   )-----------( 130      ( 2019 03:24 )             35.6     -12    147<H>  |  105  |  24<H>  ----------------------------<  200<H>  4.1   |  29  |  0.93    Ca    8.7      2019 03:24  Phos  3.9       Mg     2.1         TPro  6.0  /  Alb  3.1<L>  /  TBili  0.6  /  DBili  x   /  AST  19  /  ALT  51<H>  /  AlkPhos  71      PT/INR - ( 2019 21:29 )   PT: 13.4 sec;   INR: 1.17 ratio         PTT - ( 2019 21:29 )  PTT:27.2 sec  CARDIAC MARKERS ( 2019 20:46 )  x     / x     / 92 U/L / x     / 2.7 ng/mL      Serum Pro-Brain Natriuretic Peptide: 2837 pg/mL ( @ 20:46)    New results/imaging:  Cath 19  VENTRICLES: No left ventriculogram was performed.  CORONARYVESSELS: The coronary circulation is right dominant.  LM:   --  LM: Angiography showed minor luminal irregularities with no flow  limiting lesions.  LAD:   --  Proximal LAD: There was a 100 % stenosis.  CX:   --  Proximal circumflex: There was a tubular 95 % stenosis at the  site of a prior stent, in-stent.  RCA:   --  Proximal RCA: There was a 100 % stenosis.  --  RPDA: There was a tubular 90 % stenosis distal to the graft  anastomosis. The lesion was irregularly contoured, ulcerated, and complex.  --  RPLS: There was a 90 % stenosis.  GRAFTS:   --  Graft to the mid LAD: The graft was a LIMA. It was normal.  --  Graft to the 1st diagonal: The graft was a saphenous vein graft from  the aorta. Graft angiography showed minor luminal irregularities.  --  Graft to the RPDA: The graft was a saphenous vein graft from the aorta.  It was normal.  COMPLICATIONS: There were no complications.  DIAGNOSTIC IMPRESSIONS: Severe, new lesions in the Lcx (restenosis and  RPDA). High LVEDP  DIAGNOSTIC RECOMMENDATIONS: Admit to CCU to wean off Bipap and titrate med  Rx. EP eval for SCD. Plan for PCI early next week pending respiratory  status

## 2019-01-13 NOTE — PROGRESS NOTE ADULT - PROBLEM SELECTOR PLAN 3
-This is likely secondary to hospital acquired  -Unlikely infectious etiology. S/p Vanc/Zosyn at OSH for aspiration PNA. No indication of infection at this time.  -Supportive measures recommended  -Aspiration precautions  -Continue to monitor for infection -likely Hospital acquired  -Continue to monitor for infection  -Afebrile

## 2019-01-13 NOTE — PROGRESS NOTE ADULT - ASSESSMENT
78M CAD s/p 4vCABG 1997 and multiple PCIs, HTN, PUD, DM2, HFrEF transfer from [facility] after unwitnessed fall with VFib arrest requiring 20 min CPR, extubated on 1/9 to BiPAP with hospital course complicated by aspiration PNA.  Transferred to [facility] for LHC, found to have severe flow limiting lesions in Cx and RPDA, planned for revascularization next week with medication optimization.    #VF arrest/NSTEMI  -- continue asa/plavix  -- continue amiodarone  -- continue lisinopril and toprol  -- continue statin  -- EP evaluation for ICD  -- plan for cath next week    #HFrEF  -- continue lasix 40 mg daily  -- monitor I/Os    Nicholas Vaughan MD 78M CAD s/p 4vCABG 1997 and multiple PCIs, HTN, PUD, DM2, HFrEF transfer from [facility] after unwitnessed fall with VFib arrest requiring 20 min CPR, extubated on 1/9 to BiPAP with hospital course complicated by aspiration PNA.  Transferred to [facility] for LHC, found to have severe flow limiting lesions in Cx and RPDA, planned for revascularization next week with medication optimization.    #VF arrest/NSTEMI  -- continue asa/plavix  -- continue amiodarone  -- continue toprol  -- continue statin  -- obtain TTE  -- EP evaluation for ICD  -- plan for cath next week    #HFrEF  -- continue lasix 40 mg daily  -- monitor I/Os    Nicholas Vaughan MD

## 2019-01-14 DIAGNOSIS — M79.89 OTHER SPECIFIED SOFT TISSUE DISORDERS: ICD-10-CM

## 2019-01-14 DIAGNOSIS — I49.01 VENTRICULAR FIBRILLATION: ICD-10-CM

## 2019-01-14 LAB
ANION GAP SERPL CALC-SCNC: 11 MMOL/L — SIGNIFICANT CHANGE UP (ref 5–17)
BUN SERPL-MCNC: 24 MG/DL — HIGH (ref 7–23)
CALCIUM SERPL-MCNC: 8.9 MG/DL — SIGNIFICANT CHANGE UP (ref 8.4–10.5)
CHLORIDE SERPL-SCNC: 102 MMOL/L — SIGNIFICANT CHANGE UP (ref 96–108)
CO2 SERPL-SCNC: 29 MMOL/L — SIGNIFICANT CHANGE UP (ref 22–31)
CREAT SERPL-MCNC: 0.9 MG/DL — SIGNIFICANT CHANGE UP (ref 0.5–1.3)
GLUCOSE BLDC GLUCOMTR-MCNC: 137 MG/DL — HIGH (ref 70–99)
GLUCOSE BLDC GLUCOMTR-MCNC: 149 MG/DL — HIGH (ref 70–99)
GLUCOSE BLDC GLUCOMTR-MCNC: 166 MG/DL — HIGH (ref 70–99)
GLUCOSE BLDC GLUCOMTR-MCNC: 182 MG/DL — HIGH (ref 70–99)
GLUCOSE SERPL-MCNC: 170 MG/DL — HIGH (ref 70–99)
HCT VFR BLD CALC: 35.4 % — LOW (ref 39–50)
HGB BLD-MCNC: 11.7 G/DL — LOW (ref 13–17)
MCHC RBC-ENTMCNC: 33 GM/DL — SIGNIFICANT CHANGE UP (ref 32–36)
MCHC RBC-ENTMCNC: 33.1 PG — SIGNIFICANT CHANGE UP (ref 27–34)
MCV RBC AUTO: 100 FL — SIGNIFICANT CHANGE UP (ref 80–100)
PLATELET # BLD AUTO: 127 K/UL — LOW (ref 150–400)
POTASSIUM SERPL-MCNC: 3.6 MMOL/L — SIGNIFICANT CHANGE UP (ref 3.5–5.3)
POTASSIUM SERPL-SCNC: 3.6 MMOL/L — SIGNIFICANT CHANGE UP (ref 3.5–5.3)
RBC # BLD: 3.52 M/UL — LOW (ref 4.2–5.8)
RBC # FLD: 13.2 % — SIGNIFICANT CHANGE UP (ref 10.3–14.5)
SODIUM SERPL-SCNC: 142 MMOL/L — SIGNIFICANT CHANGE UP (ref 135–145)
WBC # BLD: 5.8 K/UL — SIGNIFICANT CHANGE UP (ref 3.8–10.5)
WBC # FLD AUTO: 5.8 K/UL — SIGNIFICANT CHANGE UP (ref 3.8–10.5)

## 2019-01-14 PROCEDURE — 99233 SBSQ HOSP IP/OBS HIGH 50: CPT | Mod: GC

## 2019-01-14 PROCEDURE — 99233 SBSQ HOSP IP/OBS HIGH 50: CPT

## 2019-01-14 PROCEDURE — 99232 SBSQ HOSP IP/OBS MODERATE 35: CPT

## 2019-01-14 PROCEDURE — 93971 EXTREMITY STUDY: CPT | Mod: 26

## 2019-01-14 RX ORDER — ACETAMINOPHEN 500 MG
650 TABLET ORAL EVERY 6 HOURS
Qty: 0 | Refills: 0 | Status: DISCONTINUED | OUTPATIENT
Start: 2019-01-14 | End: 2019-01-29

## 2019-01-14 RX ORDER — POTASSIUM CHLORIDE 20 MEQ
40 PACKET (EA) ORAL ONCE
Qty: 0 | Refills: 0 | Status: COMPLETED | OUTPATIENT
Start: 2019-01-14 | End: 2019-01-14

## 2019-01-14 RX ORDER — POTASSIUM CHLORIDE 20 MEQ
40 PACKET (EA) ORAL ONCE
Qty: 0 | Refills: 0 | Status: DISCONTINUED | OUTPATIENT
Start: 2019-01-14 | End: 2019-01-14

## 2019-01-14 RX ADMIN — Medication 500000 UNIT(S): at 23:41

## 2019-01-14 RX ADMIN — Medication 500000 UNIT(S): at 18:03

## 2019-01-14 RX ADMIN — HEPARIN SODIUM 5000 UNIT(S): 5000 INJECTION INTRAVENOUS; SUBCUTANEOUS at 21:35

## 2019-01-14 RX ADMIN — Medication 500000 UNIT(S): at 11:19

## 2019-01-14 RX ADMIN — Medication 40 MILLIGRAM(S): at 05:54

## 2019-01-14 RX ADMIN — ATORVASTATIN CALCIUM 40 MILLIGRAM(S): 80 TABLET, FILM COATED ORAL at 21:35

## 2019-01-14 RX ADMIN — Medication 500000 UNIT(S): at 00:30

## 2019-01-14 RX ADMIN — HEPARIN SODIUM 5000 UNIT(S): 5000 INJECTION INTRAVENOUS; SUBCUTANEOUS at 05:54

## 2019-01-14 RX ADMIN — Medication 81 MILLIGRAM(S): at 11:19

## 2019-01-14 RX ADMIN — HEPARIN SODIUM 5000 UNIT(S): 5000 INJECTION INTRAVENOUS; SUBCUTANEOUS at 13:43

## 2019-01-14 RX ADMIN — Medication 500000 UNIT(S): at 05:54

## 2019-01-14 RX ADMIN — PANTOPRAZOLE SODIUM 40 MILLIGRAM(S): 20 TABLET, DELAYED RELEASE ORAL at 05:53

## 2019-01-14 RX ADMIN — CLOPIDOGREL BISULFATE 75 MILLIGRAM(S): 75 TABLET, FILM COATED ORAL at 11:19

## 2019-01-14 RX ADMIN — CHLORHEXIDINE GLUCONATE 1 APPLICATION(S): 213 SOLUTION TOPICAL at 07:55

## 2019-01-14 RX ADMIN — Medication 2: at 09:40

## 2019-01-14 RX ADMIN — Medication 40 MILLIEQUIVALENT(S): at 13:43

## 2019-01-14 RX ADMIN — Medication 2000 UNIT(S): at 11:19

## 2019-01-14 RX ADMIN — AMIODARONE HYDROCHLORIDE 200 MILLIGRAM(S): 400 TABLET ORAL at 05:54

## 2019-01-14 RX ADMIN — Medication 50 MILLIGRAM(S): at 05:53

## 2019-01-14 NOTE — PROGRESS NOTE ADULT - PROBLEM SELECTOR PLAN 8
-sacral and bilateral heel pressure wounds  -Follow up with Wound care recommendations -Chronic  -Continue current management  -Monitor vitals

## 2019-01-14 NOTE — PROGRESS NOTE ADULT - SUBJECTIVE AND OBJECTIVE BOX
Patient is a 78y old  Male who presents with a chief complaint of NSTEMI (12 Jan 2019 16:30)      SUBJECTIVE / OVERNIGHT EVENTS:    pt laying in bed appears somewhat uncomfortable and lethargic. aaox2. able to answer some questions appropriately  denies cp, sob  noting LUE swelling    MEDICATIONS  (STANDING):  amiodarone    Tablet 200 milliGRAM(s) Oral daily  aspirin enteric coated 81 milliGRAM(s) Oral daily  atorvastatin 40 milliGRAM(s) Oral at bedtime  chlorhexidine 4% Liquid 1 Application(s) Topical <User Schedule>  cholecalciferol 2000 Unit(s) Oral daily  clopidogrel Tablet 75 milliGRAM(s) Oral daily  dextrose 5%. 1000 milliLiter(s) (50 mL/Hr) IV Continuous <Continuous>  dextrose 50% Injectable 12.5 Gram(s) IV Push once  dextrose 50% Injectable 25 Gram(s) IV Push once  dextrose 50% Injectable 25 Gram(s) IV Push once  furosemide   Injectable 40 milliGRAM(s) IV Push daily  heparin  Injectable 5000 Unit(s) SubCutaneous every 8 hours  insulin lispro (HumaLOG) corrective regimen sliding scale   SubCutaneous three times a day before meals  insulin lispro (HumaLOG) corrective regimen sliding scale   SubCutaneous at bedtime  metoprolol succinate ER 50 milliGRAM(s) Oral daily  nystatin    Suspension [id number] Unit(s) Oral four times a day  pantoprazole    Tablet 40 milliGRAM(s) Oral before breakfast    MEDICATIONS  (PRN):  dextrose 40% Gel 15 Gram(s) Oral once PRN Blood Glucose LESS THAN 70 milliGRAM(s)/deciliter  glucagon  Injectable 1 milliGRAM(s) IntraMuscular once PRN Glucose LESS THAN 70 milligrams/deciliter        CAPILLARY BLOOD GLUCOSE      POCT Blood Glucose.: 149 mg/dL (14 Jan 2019 12:58)  POCT Blood Glucose.: 166 mg/dL (14 Jan 2019 08:46)  POCT Blood Glucose.: 195 mg/dL (13 Jan 2019 21:57)  POCT Blood Glucose.: 172 mg/dL (13 Jan 2019 18:12)    I&O's Summary    13 Jan 2019 07:01  -  14 Jan 2019 07:00  --------------------------------------------------------  IN: 240 mL / OUT: 0 mL / NET: 240 mL    14 Jan 2019 07:01  -  14 Jan 2019 16:14  --------------------------------------------------------  IN: 220 mL / OUT: 0 mL / NET: 220 mL      T 98.1, P 72, /71, R 18, O2 100% RA  PHYSICAL EXAM:  GENERAL: NAD  EYES: Pink conjunctiva and sclera clear  NECK: Supple  CHEST/LUNG: Good air entry b/l basilar crackles   HEART: S1 and S2 RR No murmurs, rubs, or gallops  ABDOMEN: Soft, Nontender, Nondistended; Bowel sounds present  EXTREMITIES: chronic venous stasis changes of LEs bilaterally. stage I pressure ulcers bilateral heels. significant LUE sweling.  PSYCH: AAOx1-2      LABS:                        11.7   5.8   )-----------( 127      ( 14 Jan 2019 07:22 )             35.4     01-14    142  |  102  |  24<H>  ----------------------------<  170<H>  3.6   |  29  |  0.90    Ca    8.9      14 Jan 2019 07:18                RADIOLOGY & ADDITIONAL TESTS:    Imaging Personally Reviewed:    Consultant(s) Notes Reviewed:  cards, EP, case management    Care Discussed with Consultants/Other Providers: medicine NP

## 2019-01-14 NOTE — PHYSICAL THERAPY INITIAL EVALUATION ADULT - IMPAIRMENTS FOUND, PT EVAL
muscle strength/L ue edema/aerobic capacity/endurance/gait, locomotion, and balance muscle strength/aerobic capacity/endurance/gait, locomotion, and balance/sensory integrity/cognitive impairment/visual motor/L ue edema gait, locomotion, and balance/muscle strength/L ue edema/sensory integrity/visual motor/cognitive impairment/arousal, attention, and cognition/aerobic capacity/endurance

## 2019-01-14 NOTE — PHYSICAL THERAPY INITIAL EVALUATION ADULT - IMPAIRMENTS CONTRIBUTING IMPAIRED BED MOBILITY, REHAB EVAL
impaired sensory feedback/decreased strength/impaired balance/impaired postural control/decreased sensation/decreased endurance ; pt reposition in bed for comfort

## 2019-01-14 NOTE — PROGRESS NOTE ADULT - ASSESSMENT
78M CAD s/p 4vCABG 1997 and multiple PCIs, HTN, PUD, DM2, HFrEF transfer from [facility] after unwitnessed fall with VFib arrest requiring 20 min CPR, extubated on 1/9 to BiPAP with hospital course complicated by aspiration PNA. Transferred to [facility] for LHC, found to have severe flow limiting lesions in Cx and RPDA, planned for revascularization next week with medication optimization.    #VF arrest/NSTEMI  - continue asa/plavix, statin  - continue amiodarone  - continue Toprol  - Obtain TTE  - EP evaluation for ICD  - Discussed with Dr. Roberts, will plan for cath on 1/15 if Hgb/Cr remain stable and no respiratory distress    #HFrEF  - continue lasix 40 mg daily  - cont BB  - monitor I/Os, daily weights    To be discussed with Dr. Maxwell Reeves  Cardiology Fellow  [id number] 78M CAD s/p 4vCABG 1997 and multiple PCIs, HTN, PUD, DM2, HFrEF transfer from [facility] after unwitnessed fall with VFib arrest requiring 20 min CPR, extubated on 1/9 to BiPAP with hospital course complicated by aspiration PNA. Transferred to [facility] for LHC, found to have severe flow limiting lesions in Cx and RPDA, planned for revascularization after medication optimization and compensation of CHF.    #VF arrest/NSTEMI  - continue asa/plavix, statin  - continue amiodarone  - continue Toprol  - Obtain TTE  - EP evaluation for ICD  - Discussed with Dr. Roberts, will plan for cath on 1/15 if Hgb/Cr remain stable and no respiratory distress    #HFrEF  - continue lasix 40 mg daily  - cont BB  - monitor I/Os, daily weights    SLY Reeves  Cardiology Fellow  [id number]

## 2019-01-14 NOTE — PROGRESS NOTE ADULT - SUBJECTIVE AND OBJECTIVE BOX
Patient seen and examined at bedside.    Overnight Events: Denies CP, SOB, LE edema.       REVIEW OF SYSTEMS:  Constitutional:     No fevers, chills, weight loss, weight gain  HEENT:                  No dry eyes, nasal congestion, postnasal drip  CV:                         No chest pain, palpitations, orthopnea, PND  Resp:                     No cough, SOB, dyspnea, wheezing, sputum  GI:                          No nausea, vomiting, abdominal pain, diarrhea, constipation  :                        No dysuria, nocturia, hematuria, increased urinary frequency  Musculoskeletal: No back pain, myalgias, arthralgias   Skin:                       No rash, pruritus, ecchymoses  Neurological:        No headache, dizziness, syncope, weakness, numbness  Psychiatric:           No anxiety, depression   Endocrine:            No hot/cold intolerance, polydipsia  Heme/Lymph:      No bleeding, easy bruising  Allergic/Immune: No itchy eyes, rhinorrhea, hives angioedema      Current Meds:  amiodarone    Tablet 200 milliGRAM(s) Oral daily  aspirin enteric coated 81 milliGRAM(s) Oral daily  atorvastatin 40 milliGRAM(s) Oral at bedtime  chlorhexidine 4% Liquid 1 Application(s) Topical <User Schedule>  cholecalciferol 2000 Unit(s) Oral daily  clopidogrel Tablet 75 milliGRAM(s) Oral daily  dextrose 40% Gel 15 Gram(s) Oral once PRN  dextrose 5%. 1000 milliLiter(s) IV Continuous <Continuous>  dextrose 50% Injectable 12.5 Gram(s) IV Push once  dextrose 50% Injectable 25 Gram(s) IV Push once  dextrose 50% Injectable 25 Gram(s) IV Push once  furosemide   Injectable 40 milliGRAM(s) IV Push daily  glucagon  Injectable 1 milliGRAM(s) IntraMuscular once PRN  heparin  Injectable 5000 Unit(s) SubCutaneous every 8 hours  insulin lispro (HumaLOG) corrective regimen sliding scale   SubCutaneous three times a day before meals  insulin lispro (HumaLOG) corrective regimen sliding scale   SubCutaneous at bedtime  metoprolol succinate ER 50 milliGRAM(s) Oral daily  nystatin    Suspension 908281 Unit(s) Oral four times a day  pantoprazole    Tablet 40 milliGRAM(s) Oral before breakfast      PAST MEDICAL & SURGICAL HISTORY:  MI (myocardial infarction)  HTN (hypertension)  HLD (hyperlipidemia)  DM (diabetes mellitus): A1c 7.1  CAD (coronary artery disease)  S/P primary angioplasty with coronary stent  S/P CABG x 4: 1997 by Dr. Luis Bates:  T(F): 98.1 (01-14), Max: 98.5 (01-13)  HR: 72 (01-14) (72 - 78)  BP: 151/71 (01-14) (148/70 - 153/77)  RR: 18 (01-14)  SpO2: 100% (01-14)  I&O's Summary    13 Jan 2019 07:01  -  14 Jan 2019 07:00  --------------------------------------------------------  IN: 240 mL / OUT: 0 mL / NET: 240 mL        Physical Exam:  Appearance: No acute distress; well appearing  Eyes: PERRL, EOMI, pink conjunctiva  HENT: Normal oral mucosa  Cardiovascular: RRR, S1, S2, no murmurs, rubs, or gallops; no edema; no JVD  Respiratory: Clear to auscultation bilaterally  Gastrointestinal: soft, non-tender, non-distended with normal bowel sounds  Musculoskeletal: No clubbing; no joint deformity   Neurologic: Non-focal  Lymphatic: No lymphadenopathy  Psychiatry: AAOx3, mood & affect appropriate  Skin: No rashes, ecchymoses, or cyanosis                          11.7   5.8   )-----------( 127      ( 14 Jan 2019 07:22 )             35.4     01-14    142  |  102  |  24<H>  ----------------------------<  170<H>  3.6   |  29  |  0.90    Ca    8.9      14 Jan 2019 07:18        Serum Pro-Brain Natriuretic Peptide: 2837 pg/mL (01-11 @ 20:46)      Cath:  < from: Cardiac Cath Lab - Adult (01.11.19 @ 17:59) >  CORONARYVESSELS: The coronary circulation is right dominant.  LM:   --  LM: Angiography showed minor luminal irregularities with no flow  limiting lesions.  LAD:   --  Proximal LAD: There was a 100 % stenosis.  CX:   --  Proximal circumflex: There was a tubular 95 % stenosis at the  site of a prior stent, in-stent.  RCA:   --  Proximal RCA: There was a 100 % stenosis.  --  RPDA: There was a tubular 90 % stenosis distal to the graft  anastomosis. The lesion was irregularly contoured, ulcerated, and complex.  --  RPLS: There was a 90 % stenosis.  GRAFTS:   --  Graft to the mid LAD: The graft was a LIMA. It was normal.  --  Graft to the 1st diagonal: The graft was a saphenous vein graft from  the aorta. Graft angiography showed minor luminal irregularities.  --  Graft to the RPDA: The graft was a saphenous vein graft from the aorta.  It was normal.  COMPLICATIONS: There were no complications.  DIAGNOSTIC IMPRESSIONS: Severe, new lesions in the Lcx (restenosis and  RPDA). High LVEDP    < end of copied text >      Interpretation of Telemetry: NSR 60-70s

## 2019-01-14 NOTE — PROGRESS NOTE ADULT - PROBLEM SELECTOR PLAN 5
-Stable Hb at 11 with MCV> 100  - B12/Folate- ok -s/p CABG  -NSTEMI c/b V Fib arrest s/p ROSC after 20mins of CPR.  -Continue Amiodarone

## 2019-01-14 NOTE — PHYSICAL THERAPY INITIAL EVALUATION ADULT - BED MOBILITY LIMITATIONS, REHAB EVAL
impaired ability to control trunk for mobility/decreased ability to use arms for pushing/pulling/decreased ability to use legs for bridging/pushing 1/15/19 pt received in recliner reclined ; pt sit in upright position work on weight shift and balance mod of 1 pt tendency to list to R , reaching activity mod of 1 x 5 each , reorient head position to midline x 5 pt tendency to SB R ; Shinamol NP in to see for neuro check/impaired ability to control trunk for mobility/decreased ability to use arms for pushing/pulling/decreased ability to use legs for bridging/pushing

## 2019-01-14 NOTE — CONSULT NOTE ADULT - ASSESSMENT
#VF arrest/NSTEMI  - continue asa/plavix, statin  - continue amiodarone  - continue Toprol  - Obtain TTE  - EP evaluation for ICD  - Discussed with Dr. Roberts, will plan for cath on 1/15 if Hgb/Cr remain stable and no respiratory distress 79yo male with hx of CAD with multiple stents, s/p CABG x4 in 1997, HTN DM2, and Acute HFrEF (EF 30-35%), transferred from [facility] on 1/11 for revascularization for NSTEMI and ICD evaluation s/p vfib arrest. Now s/p cath revealing multiple vessel disease (Cx and RPDA) without any intervention, plan for PCI tomorrow by Dr. Roberts. EP consulted for ICD implantation.

## 2019-01-14 NOTE — PROGRESS NOTE ADULT - PROBLEM SELECTOR PLAN 1
-s/p  V. 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   -Optimize medical management and plan for PCI as early as tomorrow. f/u cards  -Continue ASA/Plavix/Metoprolol/Statin  - Cardiology note appreciated check LUE duplex to r/o DVT  keep arm elevated  tylenol prn

## 2019-01-14 NOTE — PROGRESS NOTE ADULT - PROBLEM SELECTOR PROBLEM 5
Macrocytic anemia Coronary artery disease involving native coronary artery of native heart, angina presence unspecified

## 2019-01-14 NOTE — PROGRESS NOTE ADULT - PROBLEM SELECTOR PLAN 9
DVT PPx SQH  Aspiration precaution  Fall precaution  PT Eval pending. per case management pts daughter is interested in LTC as pt will unlikely be able to care for himself at home -sacral and bilateral heel pressure wounds  -Follow up with Wound care recommendations

## 2019-01-14 NOTE — PHYSICAL THERAPY INITIAL EVALUATION ADULT - PERTINENT HX OF CURRENT PROBLEM, REHAB EVAL
1/11/19 Cardiac Cath : LAD prox 100 % stenosis ,prox circumflex 95%(new severe),prox  % stenosis, RPDA tubular 90 % stenosis , grafts intact ; EP for possible AICD;

## 2019-01-14 NOTE — PHYSICAL THERAPY INITIAL EVALUATION ADULT - LEVEL OF INDEPENDENCE: SIT/STAND, REHAB EVAL
unable to perform pt too weak at this time , work on sitting balance with assist/unable to perform unable to perform/pt too weak at this time , work on sitting balance with assist...  performed on 1/21 max AX1

## 2019-01-14 NOTE — CONSULT NOTE ADULT - ATTENDING COMMENTS
77 yo diabetic male, [facility], s/p cath here- no intervention  Unable to transfer oob - requires use of Miriam   Awake but debilitatted  Being fed breakfast  has minor bilat heel DTI- with bilat heel pads , and no overt ischemia of either foot  Has a superficial DTI , buttocks, sacral, not in need of debridement  Incontinent of urine and stool  Foam removed  Op f/u info provided 77 yo diabetic male, s/p cardiac arrest at [facility], s/p cath here- no intervention  Unable to transfer oob - requires use of Miriam   Awake but debilitated  Being fed breakfast  has minor bilat heel DTI- with bilat heel pads , and no overt ischemia of either foot  Has a superficial DTI , buttocks, sacral, not in need of debridement  Incontinent of urine and stool  Foam removed  Op f/u info provided

## 2019-01-14 NOTE — CONSULT NOTE ADULT - PROBLEM SELECTOR RECOMMENDATION 9
in setting of NSTEMI  --patient is not a candidate for ICD at this time. For MI patients, waiting time w/o intervention is 40 days and after PCI is 3 months.  --recommend to repeat echo     Discussed case with Dr. Roth. EP to sign off.     Brenda Velasco, [facility]  [phone]

## 2019-01-14 NOTE — PHYSICAL THERAPY INITIAL EVALUATION ADULT - FOLLOWS COMMANDS/ANSWERS QUESTIONS, REHAB EVAL
100% of the time/follow simple commands 1-2 step 75 % of time follow simple commands 1-2 step 75 % of time; 1/15/19 pt follow simple commands 100 % of time 1-2 step/100% of the time

## 2019-01-14 NOTE — PHYSICAL THERAPY INITIAL EVALUATION ADULT - ADDITIONAL COMMENTS
lives alone , has dtr Samanta Sandoval [facility] [phone] lives alone in house with few steps to enter with rail , bedroom on second level with rail  , has dtr Samanta Sandoval -[phone]; pt was independent PTA w/o assistive device per pt ;pt has working glucometer and independent in its use; per MD note pt was being gonzalo lifted at [facility] for oob ; today HELD OOB await doppler L ue + moderate edema

## 2019-01-14 NOTE — PHYSICAL THERAPY INITIAL EVALUATION ADULT - ACTIVE RANGE OF MOTION EXAMINATION, REHAB EVAL
RUE and L UE wfl's aarom L weaker than francheska ram's kristan's RUE and L UE wfl's aarom L weaker than r Marlin hogan wfl's kristan's;

## 2019-01-14 NOTE — PROGRESS NOTE ADULT - PROBLEM SELECTOR PLAN 3
-likely Hospital acquired; other work up non revealing including CT head  -Continue to monitor for infection  -out of bed to chair, frequent reorientation  -Afebrile -Saturating well on RA  -TTE EF 30-35%  -Continue Lasix 40mg IV daily  -Daily weights and Strict I/Os  -Follow up on EP recommendations regarding AICD placement  -Continue to monitor respiratory status  -KCL 40 given today -Saturating well on RA  -TTE EF 30-35% from outside hospital   -order TTE here  -Continue Lasix 40mg IV daily  -Daily weights and Strict I/Os  -Follow up on EP recommendations regarding AICD placement  -Continue to monitor respiratory status  -KCL 40 given today

## 2019-01-14 NOTE — PHYSICAL THERAPY INITIAL EVALUATION ADULT - PRECAUTIONS/LIMITATIONS, REHAB EVAL
77 y/o male with PMHx of CAD with s/p CABG 4v in 1997, multiple stents, HTN, PUD, Gout, DM2 presented to [facility] on Dec 31st after unwitnessed fall. According to family, patient lives alone and they believe they found him on the floor on the second day. Patient got admitted to [facility] on telemetry floor to r/o mechanical vs Arrhythmogenic syncope. While admitted on the floor patient had elevated troponin  followed by V. Fib arrest with full code, returned to rhythm after 20 minutes of CPR,  intubated and moved to ICU. Patient got self extubated two days ago and was put on BiPAP, which is tolerating well. Patient at present is oriented to self and immediate family, but unaware of the time and situation. Patient had an Echo done on 12/31 with EF of 60% and repeat TTE on 1/2 showed EF of30-35%. Was diagnosed with septic shock and was on pressors and antibiotics ( course completed as per documents) Patient had  CHF and aspiration PNA and elevated troponin during the hospital stay. Transferred to [facility] for cardiac evaluation; cardiac cath and possible AICD placement. On BiPAP with 35% with saturation of 100%. Patient is DNR but rescinded for cath.

## 2019-01-14 NOTE — PROGRESS NOTE ADULT - PROBLEM SELECTOR PROBLEM 4
Coronary artery disease involving native coronary artery of native heart, angina presence unspecified Delirium

## 2019-01-14 NOTE — CONSULT NOTE ADULT - ASSESSMENT
A/P: 77 y/o male with PMHx of CAD with s/p CABG 4v in 1997, multiple stents, HTN, PUD, Gout, DM2 presented to [facility] on Dec 31st after unwitnessed fall transferred here for Cath and Cardiac workup    Sacral evolving DTI w/ partial thickness skin loss- TRIAD paste  Lt heel resolving DTI - Cavilon  BLE elevation  Moisturize intact skin w/ SWEEN cream BID  con't Nutrition (as tolerated), Nutrition Consult  con't Offloading   con't Pericare  Care as per medicine will follow w/ you  Upon discharge f/u as outpatient at [facility] [address] [place] [address] [phone]  Seen w/ attng and D/w team  Thank you for this consult  Alyssa Bella PA-C [id number]

## 2019-01-14 NOTE — PROGRESS NOTE ADULT - PROBLEM SELECTOR PROBLEM 2
Acute on chronic combined systolic and diastolic congestive heart failure NSTEMI (non-ST elevated myocardial infarction)

## 2019-01-14 NOTE — PHYSICAL THERAPY INITIAL EVALUATION ADULT - MANUAL MUSCLE TESTING RESULTS, REHAB EVAL
Rue 3-/5 R shoulder / L shoulder 2+/5 ; elbow R 3-/5 , L 3-/5 , fingers R flex/extend 3/5 R/L 3-/5 ; hips/knees 2+ /5 ; F/A 3/5 Rue 3-/5 R shoulder / L shoulder 2+/5 ; elbow R 3-/5 , L 3-/5 , fingers R flex/extend 3/5 R/L 3-/5 ; hips/knees 2+ /5 ; F/A 3/5; 1/15/19  L 3/5 today as edema decreased LUE and able to move fingers better today

## 2019-01-14 NOTE — CONSULT NOTE ADULT - SUBJECTIVE AND OBJECTIVE BOX
HPI: 79yo male with hx of CAD with multiple stents, s/p CABG x4 in 1997, HTN DM2, and Acute HFrEF (EF 30-35%), transferred from [facility] for revascularization for NSTEMI and ICD evaluation s/p vfib arrest. Patient admitted to [facility] on 12/31/18 for unwitnessed fall with hospital course c/b elevated troponins, v fib arrest requiring 20min of CPR s/p ROSC and intubation, septic shock 2/2 aspiration PNA s/p pressor support and antibiotic course. Transferred to [facility] on 1/11 for cardiac evaluation, now s/p cath revealing multiple vessel disease (Cx and RPDA) without any intervention, with cardiology recommending medical optimization prior to PCI tomorrow by Dr. Roberts and compensation of CHF. Seen patient at bedside, alert, in NAD is a poor historian due to his condition.       Allergies  No Known Allergies  Intolerances    MEDICATIONS:  amiodarone    Tablet 200 milliGRAM(s) Oral daily  aspirin enteric coated 81 milliGRAM(s) Oral daily  clopidogrel Tablet 75 milliGRAM(s) Oral daily  furosemide   Injectable 40 milliGRAM(s) IV Push daily  heparin  Injectable 5000 Unit(s) SubCutaneous every 8 hours  metoprolol succinate ER 50 milliGRAM(s) Oral daily    nystatin    Suspension 993388 Unit(s) Oral four times a day        pantoprazole    Tablet 40 milliGRAM(s) Oral before breakfast    atorvastatin 40 milliGRAM(s) Oral at bedtime  dextrose 40% Gel 15 Gram(s) Oral once PRN  dextrose 50% Injectable 12.5 Gram(s) IV Push once  dextrose 50% Injectable 25 Gram(s) IV Push once  dextrose 50% Injectable 25 Gram(s) IV Push once  glucagon  Injectable 1 milliGRAM(s) IntraMuscular once PRN  insulin lispro (HumaLOG) corrective regimen sliding scale   SubCutaneous three times a day before meals  insulin lispro (HumaLOG) corrective regimen sliding scale   SubCutaneous at bedtime    chlorhexidine 4% Liquid 1 Application(s) Topical <User Schedule>  cholecalciferol 2000 Unit(s) Oral daily  dextrose 5%. 1000 milliLiter(s) IV Continuous <Continuous>  potassium chloride   Powder 40 milliEquivalent(s) Oral once      PAST MEDICAL & SURGICAL HISTORY:  MI (myocardial infarction)  HTN (hypertension)  HLD (hyperlipidemia)  DM (diabetes mellitus): A1c 7.1  CAD (coronary artery disease)  S/P primary angioplasty with coronary stent  S/P CABG x 4: 1997 by Dr. Smith      FAMILY HISTORY:      SOCIAL HISTORY:    [ ]Non-smoker  [ ] Smoker  [ ] Alcohol      REVIEW OF SYSTEMS:  See HPI. Otherwise, 10 point ROS done and otherwise negative.    PHYSICAL EXAM:  T(C): 36.6 (01-14-19 @ 12:41), Max: 36.9 (01-13-19 @ 21:29)  HR: 74 (01-14-19 @ 12:54) (72 - 78)  BP: 148/73 (01-14-19 @ 12:54) (122/70 - 151/71)  RR: 18 (01-14-19 @ 12:54) (18 - 18)  SpO2: 96% (01-14-19 @ 12:54) (92% - 100%)  Wt(kg): --  I&O's Summary    13 Jan 2019 07:01  -  14 Jan 2019 07:00  --------------------------------------------------------  IN: 240 mL / OUT: 0 mL / NET: 240 mL    14 Jan 2019 07:01  -  14 Jan 2019 13:08  --------------------------------------------------------  IN: 120 mL / OUT: 0 mL / NET: 120 mL        Appearance: Alert. NAD	  HEENT:   NC/AT	  Cardiovascular: +S1S2 RRR no m/g/r  Respiratory: CTA B/L	  Psychiatry: A & O x 3, Mood & affect appropriate  Gastrointestinal:  Soft, NT.ND., + BS	  Skin: No rashes	  Neurologic: Non-focal  Extremities: No edema BLE  Vascular: Peripheral pulses palpable 2+ bilaterally      LABS:	 	    CBC Full  -  ( 14 Jan 2019 07:22 )  WBC Count : 5.8 K/uL  Hemoglobin : 11.7 g/dL  Hematocrit : 35.4 %  Platelet Count - Automated : 127 K/uL  Mean Cell Volume : 100.0 fl  Mean Cell Hemoglobin : 33.1 pg  Mean Cell Hemoglobin Concentration : 33.0 gm/dL  Auto Neutrophil # : x  Auto Lymphocyte # : x  Auto Monocyte # : x  Auto Eosinophil # : x  Auto Basophil # : x  Auto Neutrophil % : x  Auto Lymphocyte % : x  Auto Monocyte % : x  Auto Eosinophil % : x  Auto Basophil % : x    01-14    142  |  102  |  24<H>  ----------------------------<  170<H>  3.6   |  29  |  0.90  01-13    146<H>  |  104  |  26<H>  ----------------------------<  165<H>  3.7   |  30  |  0.94    Ca    8.9      14 Jan 2019 07:18  Ca    8.8      13 Jan 2019 06:20        proBNP:   Lipid Profile:   HgA1c:   TSH:       CARDIAC MARKERS:                TELEMETRY: 	    ECG:  	  RADIOLOGY:  OTHER: 	    PREVIOUS DIAGNOSTIC TESTING:    Echocardiogram:    Catheterization:    Stress Test:  	  	  ASSESSMENT/PLAN: HPI: 77yo male with hx of CAD with multiple stents, s/p CABG x4 in 1997, HTN DM2, and Acute HFrEF (EF 30-35%), transferred from [facility] for revascularization for NSTEMI and ICD evaluation s/p vfib arrest. Patient admitted to [facility] on 12/31/18 for unwitnessed fall with hospital course c/b elevated troponins, v fib arrest requiring 20min of CPR s/p ROSC and intubation, septic shock 2/2 aspiration PNA s/p pressor support and antibiotic course. Transferred to [facility] on 1/11 for cardiac evaluation, now s/p cath revealing multiple vessel disease (Cx and RPDA) without any intervention, with cardiology recommending medical optimization prior to PCI tomorrow by Dr. Roberts and compensation of CHF. Echo from 12/31 EF 60% repeat on 1/2 EF 30-35% w/ severe LVSD. Currently, on Toprol XL 50mg QD, Amiodarone 200mg QD, and Lasix. Seen patient at bedside, alert, in NAD; poor historian due to condition.     Allergies  No Known Allergies  Intolerances    MEDICATIONS:  amiodarone    Tablet 200 milliGRAM(s) Oral daily  aspirin enteric coated 81 milliGRAM(s) Oral daily  clopidogrel Tablet 75 milliGRAM(s) Oral daily  furosemide   Injectable 40 milliGRAM(s) IV Push daily  heparin  Injectable 5000 Unit(s) SubCutaneous every 8 hours  metoprolol succinate ER 50 milliGRAM(s) Oral daily    nystatin    Suspension 903681 Unit(s) Oral four times a day        pantoprazole    Tablet 40 milliGRAM(s) Oral before breakfast    atorvastatin 40 milliGRAM(s) Oral at bedtime  dextrose 40% Gel 15 Gram(s) Oral once PRN  dextrose 50% Injectable 12.5 Gram(s) IV Push once  dextrose 50% Injectable 25 Gram(s) IV Push once  dextrose 50% Injectable 25 Gram(s) IV Push once  glucagon  Injectable 1 milliGRAM(s) IntraMuscular once PRN  insulin lispro (HumaLOG) corrective regimen sliding scale   SubCutaneous three times a day before meals  insulin lispro (HumaLOG) corrective regimen sliding scale   SubCutaneous at bedtime    chlorhexidine 4% Liquid 1 Application(s) Topical <User Schedule>  cholecalciferol 2000 Unit(s) Oral daily  dextrose 5%. 1000 milliLiter(s) IV Continuous <Continuous>  potassium chloride   Powder 40 milliEquivalent(s) Oral once      PAST MEDICAL & SURGICAL HISTORY:  MI (myocardial infarction)  HTN (hypertension)  HLD (hyperlipidemia)  DM (diabetes mellitus): A1c 7.1  CAD (coronary artery disease)  S/P primary angioplasty with coronary stent  S/P CABG x 4: 1997 by Dr. Smith      FAMILY HISTORY: deferred      SOCIAL HISTORY:  deferred  [ ]Non-smoker  [ ] Smoker  [ ] Alcohol      REVIEW OF SYSTEMS:  deferred    PHYSICAL EXAM:  T(C): 36.6 (01-14-19 @ 12:41), Max: 36.9 (01-13-19 @ 21:29)  HR: 74 (01-14-19 @ 12:54) (72 - 78)  BP: 148/73 (01-14-19 @ 12:54) (122/70 - 151/71)  RR: 18 (01-14-19 @ 12:54) (18 - 18)  SpO2: 96% (01-14-19 @ 12:54) (92% - 100%)  Wt(kg): --  I&O's Summary    13 Jan 2019 07:01  -  14 Jan 2019 07:00  --------------------------------------------------------  IN: 240 mL / OUT: 0 mL / NET: 240 mL    14 Jan 2019 07:01  -  14 Jan 2019 13:08  --------------------------------------------------------  IN: 120 mL / OUT: 0 mL / NET: 120 mL    Appearance: Alert. NAD	  HEENT:   NC/AT	  Cardiovascular: +S1S2 RRR no m/g/r  Respiratory: CTA B/L	  Psychiatry: unable to assess  Gastrointestinal:  Soft, NT.ND., + BS	  Skin: No rashes	  Neurologic: Non-focal  Extremities: No edema BLE. +3 to lt hand  Vascular: Peripheral pulses palpable 2+ bilaterally    LABS:	 	  CBC Full  -  ( 14 Jan 2019 07:22 )  WBC Count : 5.8 K/uL  Hemoglobin : 11.7 g/dL  Hematocrit : 35.4 %  Platelet Count - Automated : 127 K/uL  Mean Cell Volume : 100.0 fl  Mean Cell Hemoglobin : 33.1 pg  Mean Cell Hemoglobin Concentration : 33.0 gm/dL  Auto Neutrophil # : x  Auto Lymphocyte # : x  Auto Monocyte # : x  Auto Eosinophil # : x  Auto Basophil # : x  Auto Neutrophil % : x  Auto Lymphocyte % : x  Auto Monocyte % : x  Auto Eosinophil % : x  Auto Basophil % : x    01-14    142  |  102  |  24<H>  ----------------------------<  170<H>  3.6   |  29  |  0.90  01-13    146<H>  |  104  |  26<H>  ----------------------------<  165<H>  3.7   |  30  |  0.94    Ca    8.9      14 Jan 2019 07:18  Ca    8.8      13 Jan 2019 06:20    TELEMETRY: SR 70s w/ occasional PVC, trigeminy   ECG:  	  RADIOLOGY: 1/11 CXR: IMPRESSION: Right defibrillator pad is present.    Moderate bilateral effusions and pulmonary edema. No pneumothorax.     Sternotomy. The cardiac silhouette is enlarged.    PREVIOUS DIAGNOSTIC TESTING:    Echocardiogram: see paper chart    Catheterization: 1/11 cath: CORONARY VESSELS: The coronary circulation is right dominant.  LM:   --  LM: Angiography showed minor luminal irregularities with no flow  limiting lesions.  LAD:   --  Proximal LAD: There was a 100 % stenosis.  CX:   --  Proximal circumflex: There was a tubular 95 % stenosis at the  site of a prior stent, in-stent.  RCA:   --  Proximal RCA: There was a 100 % stenosis.  --  RPDA: There was a tubular 90 % stenosis distal to the graft  anastomosis. The lesion was irregularly contoured, ulcerated, and complex.  --  RPLS: There was a 90 % stenosis.  GRAFTS:   --  Graft to the mid LAD: The graft was a LIMA. It was normal.  --  Graft to the 1st diagonal: The graft was a saphenous vein graft from  the aorta. Graft angiography showed minor luminal irregularities.  --  Graft to the RPDA: The graft was a saphenous vein graft from the aorta.  It was normal.  COMPLICATIONS: There were no complications.  DIAGNOSTIC IMPRESSIONS: Severe, new lesions in the Lcx (restenosis and  RPDA). High LVEDP  DIAGNOSTIC RECOMMENDATIONS: Admit to CCU to wean off Bipap and titrate med  Rx. EP eval for SCD. Plan for PCI early next week pending respiratory  status HPI: 79yo male with hx of CAD with multiple stents, s/p CABG x4 in 1997, HTN DM2, and Acute HFrEF (EF 30-35%), transferred from [facility] for revascularization for NSTEMI and ICD evaluation s/p vfib arrest. Patient admitted to [facility] on 12/31/18 for unwitnessed fall with hospital course c/b elevated troponins, v fib arrest requiring 20min of CPR s/p ROSC and intubation, septic shock 2/2 aspiration PNA s/p pressor support and antibiotic course. Transferred to [facility] on 1/11 for cardiac evaluation, now s/p cath revealing multiple vessel disease (Cx and RPDA) without any intervention, with cardiology recommending medical optimization prior to PCI tomorrow by Dr. Roberts and compensation of HF. Echo from 12/31 EF 60% repeat on 1/2 EF 30-35% w/ severe LVSD. Currently, on Toprol XL 50mg QD, Amiodarone 200mg QD, and Lasix. Seen patient at bedside, alert, in NAD; poor historian due to condition.     Allergies  No Known Allergies  Intolerances    MEDICATIONS:  amiodarone    Tablet 200 milliGRAM(s) Oral daily  aspirin enteric coated 81 milliGRAM(s) Oral daily  clopidogrel Tablet 75 milliGRAM(s) Oral daily  furosemide   Injectable 40 milliGRAM(s) IV Push daily  heparin  Injectable 5000 Unit(s) SubCutaneous every 8 hours  metoprolol succinate ER 50 milliGRAM(s) Oral daily    nystatin    Suspension 855705 Unit(s) Oral four times a day        pantoprazole    Tablet 40 milliGRAM(s) Oral before breakfast    atorvastatin 40 milliGRAM(s) Oral at bedtime  dextrose 40% Gel 15 Gram(s) Oral once PRN  dextrose 50% Injectable 12.5 Gram(s) IV Push once  dextrose 50% Injectable 25 Gram(s) IV Push once  dextrose 50% Injectable 25 Gram(s) IV Push once  glucagon  Injectable 1 milliGRAM(s) IntraMuscular once PRN  insulin lispro (HumaLOG) corrective regimen sliding scale   SubCutaneous three times a day before meals  insulin lispro (HumaLOG) corrective regimen sliding scale   SubCutaneous at bedtime    chlorhexidine 4% Liquid 1 Application(s) Topical <User Schedule>  cholecalciferol 2000 Unit(s) Oral daily  dextrose 5%. 1000 milliLiter(s) IV Continuous <Continuous>  potassium chloride   Powder 40 milliEquivalent(s) Oral once      PAST MEDICAL & SURGICAL HISTORY:  MI (myocardial infarction)  HTN (hypertension)  HLD (hyperlipidemia)  DM (diabetes mellitus): A1c 7.1  CAD (coronary artery disease)  S/P primary angioplasty with coronary stent  S/P CABG x 4: 1997 by Dr. Smith      FAMILY HISTORY: deferred      SOCIAL HISTORY:  deferred  [ ]Non-smoker  [ ] Smoker  [ ] Alcohol      REVIEW OF SYSTEMS:  deferred    PHYSICAL EXAM:  T(C): 36.6 (01-14-19 @ 12:41), Max: 36.9 (01-13-19 @ 21:29)  HR: 74 (01-14-19 @ 12:54) (72 - 78)  BP: 148/73 (01-14-19 @ 12:54) (122/70 - 151/71)  RR: 18 (01-14-19 @ 12:54) (18 - 18)  SpO2: 96% (01-14-19 @ 12:54) (92% - 100%)  Wt(kg): --  I&O's Summary    13 Jan 2019 07:01  -  14 Jan 2019 07:00  --------------------------------------------------------  IN: 240 mL / OUT: 0 mL / NET: 240 mL    14 Jan 2019 07:01  -  14 Jan 2019 13:08  --------------------------------------------------------  IN: 120 mL / OUT: 0 mL / NET: 120 mL    Appearance: Alert. NAD	  HEENT:   NC/AT	  Cardiovascular: +S1S2 RRR no m/g/r  Respiratory: CTA B/L	  Psychiatry: unable to assess  Gastrointestinal:  Soft, NT.ND., + BS	  Skin: No rashes	  Neurologic: Non-focal  Extremities: No edema BLE. +3 to lt hand  Vascular: Peripheral pulses palpable 2+ bilaterally    LABS:	 	  CBC Full  -  ( 14 Jan 2019 07:22 )  WBC Count : 5.8 K/uL  Hemoglobin : 11.7 g/dL  Hematocrit : 35.4 %  Platelet Count - Automated : 127 K/uL  Mean Cell Volume : 100.0 fl  Mean Cell Hemoglobin : 33.1 pg  Mean Cell Hemoglobin Concentration : 33.0 gm/dL  Auto Neutrophil # : x  Auto Lymphocyte # : x  Auto Monocyte # : x  Auto Eosinophil # : x  Auto Basophil # : x  Auto Neutrophil % : x  Auto Lymphocyte % : x  Auto Monocyte % : x  Auto Eosinophil % : x  Auto Basophil % : x    01-14    142  |  102  |  24<H>  ----------------------------<  170<H>  3.6   |  29  |  0.90  01-13    146<H>  |  104  |  26<H>  ----------------------------<  165<H>  3.7   |  30  |  0.94    Ca    8.9      14 Jan 2019 07:18  Ca    8.8      13 Jan 2019 06:20    TELEMETRY: SR 70s w/ occasional PVC, trigeminy   ECG:  	  RADIOLOGY: 1/11 CXR: IMPRESSION: Right defibrillator pad is present.    Moderate bilateral effusions and pulmonary edema. No pneumothorax.     Sternotomy. The cardiac silhouette is enlarged.    PREVIOUS DIAGNOSTIC TESTING:    Echocardiogram: see paper chart    Catheterization: 1/11 cath: CORONARY VESSELS: The coronary circulation is right dominant.  LM:   --  LM: Angiography showed minor luminal irregularities with no flow  limiting lesions.  LAD:   --  Proximal LAD: There was a 100 % stenosis.  CX:   --  Proximal circumflex: There was a tubular 95 % stenosis at the  site of a prior stent, in-stent.  RCA:   --  Proximal RCA: There was a 100 % stenosis.  --  RPDA: There was a tubular 90 % stenosis distal to the graft  anastomosis. The lesion was irregularly contoured, ulcerated, and complex.  --  RPLS: There was a 90 % stenosis.  GRAFTS:   --  Graft to the mid LAD: The graft was a LIMA. It was normal.  --  Graft to the 1st diagonal: The graft was a saphenous vein graft from  the aorta. Graft angiography showed minor luminal irregularities.  --  Graft to the RPDA: The graft was a saphenous vein graft from the aorta.  It was normal.  COMPLICATIONS: There were no complications.  DIAGNOSTIC IMPRESSIONS: Severe, new lesions in the Lcx (restenosis and  RPDA). High LVEDP  DIAGNOSTIC RECOMMENDATIONS: Admit to CCU to wean off Bipap and titrate med  Rx. EP eval for SCD. Plan for PCI early next week pending respiratory  status

## 2019-01-14 NOTE — PROGRESS NOTE ADULT - PROBLEM SELECTOR PLAN 4
-s/p CABG  -NSTEMI c/b V Fib arrest s/p ROSC after 20mins of CPR.  -Continue Amiodarone -likely Hospital acquired; other work up non revealing including CT head  -Continue to monitor for infection  -out of bed to chair, frequent reorientation  -Afebrile

## 2019-01-14 NOTE — PROGRESS NOTE ADULT - ASSESSMENT
77yo male with hx of CAD with s/p CABG 4v in 1997, multiple stents, HTN, PUD, Gout, DM2, and Acute HFrEF (EF 30-35%), presented to [facility] from [facility] for NSTEMI management after stabilization from complications of V. Fib arrest s/p ROSC after 20mins of CPR and ICU stay complicated by sepsis secondary to Aspiration PNA. Pt s/p Cardiac cath at [facility] revealing multiple vessel disease without any intervention, recommending medical optimization and stabilization prior to PCI. Also recommendation for EP eval for AICD placement.

## 2019-01-14 NOTE — PHYSICAL THERAPY INITIAL EVALUATION ADULT - DISCHARGE DISPOSITION, PT EVAL
subacute rehab to increase functional mobility , strength, balance, endurance/rehabilitation facility

## 2019-01-14 NOTE — PROGRESS NOTE ADULT - PROBLEM SELECTOR PLAN 2
-Saturating well on RA  -TTE EF 30-35%  -Continue Lasix 40mg IV daily  -Daily weights and Strict I/Os  -Follow up on EP recommendations regarding AICD placement  -Continue to monitor respiratory status  -KCL 40 given today -s/p  V. 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   -Optimize medical management and plan for PCI as early as tomorrow. f/u cards  -Continue ASA/Plavix/Metoprolol/Statin  - Cardiology note appreciated -s/p  V. 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   -Optimize medical management and plan for PCI as early as tomorrow. f/u cards  -Continue ASA/Plavix/Metoprolol/Statin  -TTE ordered  - Cardiology note appreciated

## 2019-01-14 NOTE — CONSULT NOTE ADULT - SUBJECTIVE AND OBJECTIVE BOX
Wound SURGERY CONSULT NOTE    HPI:  79 y/o male with PMHx of CAD with s/p CABG 4v in 1997, multiple stents, HTN, PUD, Gout, DM2 presented to [facility] on Dec 31st after unwitnessed fall. According to family, patient lives alone and they believe they found him on the floor on the second day. Patient got admitted to [facility] on telemetry floor to r/o mechanical vs Arrhythmogenic syncope. While admitted on the floor patient had elevated troponin  followed by V. Fib arrest with full code, returned to rhythm after 20 minutes of CPR,  intubated and moved to ICU. Patient got self extubated two days ago and was put on BiPAP, which is tolerating well. Patient at present is oriented to self and immediate family, but unaware of the time and situation. Patient had an Echo done on 12/31 with EF of 60% and repeat TTE on 1/2 showed EF of30-35%. Was diagnosed with septic shock and was on pressors and antibiotics ( course completed as per documents) Patient had  CHF and aspiration PNA and elevated troponin during the hospital stay. Transferred to [facility] for cardiac evaluation; cardiac cath and possible AICD placement. On BiPAP with 35% with saturation of 100%. Patient is DNR but rescinded for cath.  CT chest on 1/8:mils b/l pleural effusion with adjacent atelectasis. Right mid lung ground glass opacity    Wound consult requested to assist w/ management of sacral & heel pressure ulcers.  Pt unsure what has been used to treat wounds in the past.  Allevyn placed on sacrum awaiting consult. No odor, redness, warmth, pain, f/c/s noted.  (+)Drainage managed by dressing.  (+)incontinent (+)sedentary. Offloading & pericare initiated upon admission.  Minimal improvement noted.   All questions asked and answered to pt's satisfaction.       PAST MEDICAL & SURGICAL HISTORY:  MI (myocardial infarction)  HTN (hypertension)  HLD (hyperlipidemia)  DM (diabetes mellitus): A1c 7.1  CAD (coronary artery disease)  S/P primary angioplasty with coronary stent  S/P CABG x 4: 1997 by Dr. Smith      REVIEW OF SYSTEMS    Skin/ MSK: see HPI  All other systems negative    MEDICATIONS  (STANDING):  amiodarone    Tablet 200 milliGRAM(s) Oral daily  aspirin enteric coated 81 milliGRAM(s) Oral daily  atorvastatin 40 milliGRAM(s) Oral at bedtime  chlorhexidine 4% Liquid 1 Application(s) Topical <User Schedule>  cholecalciferol 2000 Unit(s) Oral daily  clopidogrel Tablet 75 milliGRAM(s) Oral daily  dextrose 5%. 1000 milliLiter(s) (50 mL/Hr) IV Continuous <Continuous>  dextrose 50% Injectable 12.5 Gram(s) IV Push once  dextrose 50% Injectable 25 Gram(s) IV Push once  dextrose 50% Injectable 25 Gram(s) IV Push once  furosemide   Injectable 40 milliGRAM(s) IV Push daily  heparin  Injectable 5000 Unit(s) SubCutaneous every 8 hours  insulin lispro (HumaLOG) corrective regimen sliding scale   SubCutaneous three times a day before meals  insulin lispro (HumaLOG) corrective regimen sliding scale   SubCutaneous at bedtime  metoprolol succinate ER 50 milliGRAM(s) Oral daily  nystatin    Suspension [id number] Unit(s) Oral four times a day  pantoprazole    Tablet 40 milliGRAM(s) Oral before breakfast  potassium chloride    Tablet ER 40 milliEquivalent(s) Oral once    MEDICATIONS  (PRN):  dextrose 40% Gel 15 Gram(s) Oral once PRN Blood Glucose LESS THAN 70 milliGRAM(s)/deciliter  glucagon  Injectable 1 milliGRAM(s) IntraMuscular once PRN Glucose LESS THAN 70 milligrams/deciliter    No Known Allergies    SOCIAL HISTORY:  ; Former smoker, social ETOH, denies drugs    FAMILY HISTORY: no significant       Vital Signs Last 24 Hrs  T(C): 36.7 (14 Jan 2019 05:02), Max: 36.9 (13 Jan 2019 21:29)  T(F): 98.1 (14 Jan 2019 05:02), Max: 98.5 (13 Jan 2019 21:29)  HR: 72 (14 Jan 2019 05:02) (72 - 78)  BP: 151/71 (14 Jan 2019 05:02) (148/70 - 153/77)  BP(mean): --  RR: 18 (14 Jan 2019 05:02) (17 - 18)  SpO2: 100% (14 Jan 2019 05:02) (92% - 100%)    NAD /  A&Ox3/  Obese/ frail  WD/ WN/ Disheveled  Versa Care P500 bed/ [place]    Cardiovascular: RRR     Respiratory: CTA    Gastrointestinal soft NT/ND (+)BS      Neurology  weakened strength & sensation grossly intact    Musculoskeletal/Vascular:  very limited FROM   mild BLE edema equal  no DP/PT pulses palpable  BLE equally warm  no acute ischemia noted  BLE w/ mild hemosiderin staining  no deformities/ contractures  No Rt heel wounds  Lt heel 3cm x 2cm x 0cm healing Evolving DTI w/o blistering or open skin  No drainage  No odor, erythema, increased warmth, tenderness, induration, fluctuance    Skin:  moist w/ good turgor  Sacrum w/ 5cm x 5cm x0.1cm evolving DTI w/ areas of partial thickness skin loss  (wound more on Lt than Rt buttock)  no blistering   no serosanguinous drainage  No odor, erythema, increased warmth, tenderness, induration, fluctuance    LABS:  01-14    142  |  102  |  24<H>  ----------------------------<  170<H>  3.6   |  29  |  0.90    Ca    8.9      14 Jan 2019 07:18                            11.7   5.8   )-----------( 127      ( 14 Jan 2019 07:22 )             35.4           RADIOLOGY & ADDITIONAL STUDIES:      < from: Xray Chest 1 View- PORTABLE-Urgent (01.11.19 @ 22:32) >  FINDINGS/  IMPRESSION: Right defibrillator pad is present.    Moderate bilateral effusions and pulmonary edema. No pneumothorax.     Sternotomy. The cardiac silhouette is enlarged.

## 2019-01-14 NOTE — PHYSICAL THERAPY INITIAL EVALUATION ADULT - GENERAL OBSERVATIONS, REHAB EVAL
pt received in bed nad resting but arousable ,pt lethargic but orient to name, hospital, yr , birthday ;L ue edema noted NP blair aware and doppler ordered HELD OOB ; pt with bilateral heel boots for DTi's , max assist to reposition in bed semi supine was R sidelying pt received in bed nad resting but arousable ,pt lethargic but orient to name, hospital, yr , birthday ;L ue edema noted NP blair aware and doppler ordered HELD OOB ; pt with bilateral heel boots for DTi's , max assist to reposition in bed semi supine was R sidelying; 1/15/19 PT spoke with Blair NP re case L UE doppler (-);pt received in recliner wheels locked  pt weaker on L than R pt only able to hold up L shoulder flex to 30 degrees then can't sustain , L Le weaker than r ;pt listing R in recliner need max assist to position to midline prop w/ pillows, pt appears at times like looking into space per sister Rajendra at b/s this going on for awhile even in other hospital per sister , pt report sees "not clear " unable to track PT finger ;pupils reactive to light and equal 2mm; NP Blair in perform neuro check as well; pt head MRi and CT HEAD at [facility]  1/3/19 no acute event noted on either ; pt vss ,smile normal , speech hypophonic , follow commands; rn staff got pt oob in chair with gonzalo

## 2019-01-15 LAB
ANION GAP SERPL CALC-SCNC: 11 MMOL/L — SIGNIFICANT CHANGE UP (ref 5–17)
BUN SERPL-MCNC: 23 MG/DL — SIGNIFICANT CHANGE UP (ref 7–23)
CALCIUM SERPL-MCNC: 8.8 MG/DL — SIGNIFICANT CHANGE UP (ref 8.4–10.5)
CHLORIDE SERPL-SCNC: 103 MMOL/L — SIGNIFICANT CHANGE UP (ref 96–108)
CO2 SERPL-SCNC: 28 MMOL/L — SIGNIFICANT CHANGE UP (ref 22–31)
CREAT SERPL-MCNC: 0.89 MG/DL — SIGNIFICANT CHANGE UP (ref 0.5–1.3)
GLUCOSE BLDC GLUCOMTR-MCNC: 145 MG/DL — HIGH (ref 70–99)
GLUCOSE BLDC GLUCOMTR-MCNC: 149 MG/DL — HIGH (ref 70–99)
GLUCOSE BLDC GLUCOMTR-MCNC: 170 MG/DL — HIGH (ref 70–99)
GLUCOSE BLDC GLUCOMTR-MCNC: 237 MG/DL — HIGH (ref 70–99)
GLUCOSE SERPL-MCNC: 167 MG/DL — HIGH (ref 70–99)
HCT VFR BLD CALC: 35.6 % — LOW (ref 39–50)
HGB BLD-MCNC: 11.9 G/DL — LOW (ref 13–17)
MAGNESIUM SERPL-MCNC: 2 MG/DL — SIGNIFICANT CHANGE UP (ref 1.6–2.6)
MCHC RBC-ENTMCNC: 33.4 PG — SIGNIFICANT CHANGE UP (ref 27–34)
MCHC RBC-ENTMCNC: 33.6 GM/DL — SIGNIFICANT CHANGE UP (ref 32–36)
MCV RBC AUTO: 99.5 FL — SIGNIFICANT CHANGE UP (ref 80–100)
PHOSPHATE SERPL-MCNC: 3.3 MG/DL — SIGNIFICANT CHANGE UP (ref 2.5–4.5)
PLATELET # BLD AUTO: 114 K/UL — LOW (ref 150–400)
POTASSIUM SERPL-MCNC: 3.7 MMOL/L — SIGNIFICANT CHANGE UP (ref 3.5–5.3)
POTASSIUM SERPL-SCNC: 3.7 MMOL/L — SIGNIFICANT CHANGE UP (ref 3.5–5.3)
RBC # BLD: 3.58 M/UL — LOW (ref 4.2–5.8)
RBC # FLD: 13.7 % — SIGNIFICANT CHANGE UP (ref 10.3–14.5)
SODIUM SERPL-SCNC: 142 MMOL/L — SIGNIFICANT CHANGE UP (ref 135–145)
WBC # BLD: 5 K/UL — SIGNIFICANT CHANGE UP (ref 3.8–10.5)
WBC # FLD AUTO: 5 K/UL — SIGNIFICANT CHANGE UP (ref 3.8–10.5)

## 2019-01-15 PROCEDURE — 70450 CT HEAD/BRAIN W/O DYE: CPT | Mod: 26

## 2019-01-15 PROCEDURE — 99233 SBSQ HOSP IP/OBS HIGH 50: CPT

## 2019-01-15 PROCEDURE — 99233 SBSQ HOSP IP/OBS HIGH 50: CPT | Mod: GC

## 2019-01-15 RX ORDER — FUROSEMIDE 40 MG
40 TABLET ORAL DAILY
Qty: 0 | Refills: 0 | Status: DISCONTINUED | OUTPATIENT
Start: 2019-01-15 | End: 2019-01-29

## 2019-01-15 RX ORDER — LOSARTAN POTASSIUM 100 MG/1
50 TABLET, FILM COATED ORAL DAILY
Qty: 0 | Refills: 0 | Status: DISCONTINUED | OUTPATIENT
Start: 2019-01-15 | End: 2019-01-29

## 2019-01-15 RX ORDER — POTASSIUM CHLORIDE 20 MEQ
40 PACKET (EA) ORAL ONCE
Qty: 0 | Refills: 0 | Status: COMPLETED | OUTPATIENT
Start: 2019-01-15 | End: 2019-01-15

## 2019-01-15 RX ORDER — ALLOPURINOL 300 MG
300 TABLET ORAL DAILY
Qty: 0 | Refills: 0 | Status: DISCONTINUED | OUTPATIENT
Start: 2019-01-15 | End: 2019-01-29

## 2019-01-15 RX ADMIN — Medication 300 MILLIGRAM(S): at 14:49

## 2019-01-15 RX ADMIN — Medication 40 MILLIGRAM(S): at 05:31

## 2019-01-15 RX ADMIN — HEPARIN SODIUM 5000 UNIT(S): 5000 INJECTION INTRAVENOUS; SUBCUTANEOUS at 05:32

## 2019-01-15 RX ADMIN — HEPARIN SODIUM 5000 UNIT(S): 5000 INJECTION INTRAVENOUS; SUBCUTANEOUS at 21:33

## 2019-01-15 RX ADMIN — Medication 500000 UNIT(S): at 11:49

## 2019-01-15 RX ADMIN — CHLORHEXIDINE GLUCONATE 1 APPLICATION(S): 213 SOLUTION TOPICAL at 06:02

## 2019-01-15 RX ADMIN — LOSARTAN POTASSIUM 50 MILLIGRAM(S): 100 TABLET, FILM COATED ORAL at 14:49

## 2019-01-15 RX ADMIN — Medication 40 MILLIEQUIVALENT(S): at 14:06

## 2019-01-15 RX ADMIN — Medication 4: at 17:55

## 2019-01-15 RX ADMIN — ATORVASTATIN CALCIUM 40 MILLIGRAM(S): 80 TABLET, FILM COATED ORAL at 21:33

## 2019-01-15 RX ADMIN — AMIODARONE HYDROCHLORIDE 200 MILLIGRAM(S): 400 TABLET ORAL at 05:31

## 2019-01-15 RX ADMIN — Medication 500000 UNIT(S): at 23:59

## 2019-01-15 RX ADMIN — PANTOPRAZOLE SODIUM 40 MILLIGRAM(S): 20 TABLET, DELAYED RELEASE ORAL at 05:31

## 2019-01-15 RX ADMIN — Medication 500000 UNIT(S): at 17:31

## 2019-01-15 RX ADMIN — Medication 2: at 13:45

## 2019-01-15 RX ADMIN — Medication 2000 UNIT(S): at 11:48

## 2019-01-15 RX ADMIN — Medication 50 MILLIGRAM(S): at 05:31

## 2019-01-15 RX ADMIN — HEPARIN SODIUM 5000 UNIT(S): 5000 INJECTION INTRAVENOUS; SUBCUTANEOUS at 13:45

## 2019-01-15 RX ADMIN — CLOPIDOGREL BISULFATE 75 MILLIGRAM(S): 75 TABLET, FILM COATED ORAL at 11:48

## 2019-01-15 RX ADMIN — Medication 81 MILLIGRAM(S): at 11:48

## 2019-01-15 RX ADMIN — Medication 40 MILLIGRAM(S): at 14:49

## 2019-01-15 RX ADMIN — Medication 500000 UNIT(S): at 05:31

## 2019-01-15 NOTE — PROGRESS NOTE ADULT - ATTENDING COMMENTS
pending LHC with PCI ; further medical optimization. discussed with RN getting pt out of bed to chair today. Ordered speech/swallow eval given RN concern pt having difficulties with thin liquids and coughing after. will discuss care with family this afternoon.      EZIO Khan  pager [phone]

## 2019-01-15 NOTE — PROGRESS NOTE ADULT - SUBJECTIVE AND OBJECTIVE BOX
Patient seen and examined at bedside.    Overnight Events: Denies CP, SOB, LE edema. Feels like he can lie supine for [facility]      REVIEW OF SYSTEMS:  Constitutional:     No fevers, chills, weight loss, weight gain  HEENT:                  No dry eyes, nasal congestion, postnasal drip  CV:                         No chest pain, palpitations, orthopnea, PND  Resp:                     No cough, SOB, dyspnea, wheezing, sputum  GI:                          No nausea, vomiting, abdominal pain, diarrhea, constipation  :                        No dysuria, nocturia, hematuria, increased urinary frequency  Musculoskeletal: No back pain, myalgias, arthralgias   Skin:                       No rash, pruritus, ecchymoses  Neurological:        No headache, dizziness, syncope, weakness, numbness  Psychiatric:           No anxiety, depression   Endocrine:            No hot/cold intolerance, polydipsia  Heme/Lymph:      No bleeding, easy bruising  Allergic/Immune: No itchy eyes, rhinorrhea, hives angioedema      Current Meds:  acetaminophen   Tablet .. 650 milliGRAM(s) Oral every 6 hours PRN  amiodarone    Tablet 200 milliGRAM(s) Oral daily  aspirin enteric coated 81 milliGRAM(s) Oral daily  atorvastatin 40 milliGRAM(s) Oral at bedtime  chlorhexidine 4% Liquid 1 Application(s) Topical <User Schedule>  cholecalciferol 2000 Unit(s) Oral daily  clopidogrel Tablet 75 milliGRAM(s) Oral daily  dextrose 40% Gel 15 Gram(s) Oral once PRN  dextrose 5%. 1000 milliLiter(s) IV Continuous <Continuous>  dextrose 50% Injectable 12.5 Gram(s) IV Push once  dextrose 50% Injectable 25 Gram(s) IV Push once  dextrose 50% Injectable 25 Gram(s) IV Push once  furosemide   Injectable 40 milliGRAM(s) IV Push daily  glucagon  Injectable 1 milliGRAM(s) IntraMuscular once PRN  heparin  Injectable 5000 Unit(s) SubCutaneous every 8 hours  insulin lispro (HumaLOG) corrective regimen sliding scale   SubCutaneous three times a day before meals  insulin lispro (HumaLOG) corrective regimen sliding scale   SubCutaneous at bedtime  metoprolol succinate ER 50 milliGRAM(s) Oral daily  nystatin    Suspension [id number] Unit(s) Oral four times a day  pantoprazole    Tablet 40 milliGRAM(s) Oral before breakfast      PAST MEDICAL & SURGICAL HISTORY:  MI (myocardial infarction)  HTN (hypertension)  HLD (hyperlipidemia)  DM (diabetes mellitus): A1c 7.1  CAD (coronary artery disease)  S/P primary angioplasty with coronary stent  S/P CABG x 4: 1997 by Dr. Smith      Vitals:  T(F): 98.8 (01-15), Max: 98.8 (01-15)  HR: 71 (01-15) (71 - 74)  BP: 155/70 (01-15) (122/70 - 155/70)  RR: 18 (01-15)  SpO2: 94% (01-15)  I&O's Summary    14 Jan 2019 07:01  -  15 Gil 2019 07:00  --------------------------------------------------------  IN: 320 mL / OUT: 0 mL / NET: 320 mL        Physical Exam:  Appearance: No acute distress; well appearing  Eyes: PERRL, EOMI, pink conjunctiva  HENT: Normal oral mucosa  Cardiovascular: RRR, S1, S2, no murmurs, rubs, or gallops; no edema; no JVD  Respiratory: Clear to auscultation bilaterally  Gastrointestinal: soft, non-tender, non-distended with normal bowel sounds  Musculoskeletal: No clubbing; no joint deformity   Neurologic: Non-focal  Lymphatic: No lymphadenopathy  Psychiatry: mood & affect appropriate  Skin: No rashes, ecchymoses, or cyanosis                          11.9   5.0   )-----------( 114      ( 15 Gil 2019 06:24 )             35.6     01-15    142  |  103  |  23  ----------------------------<  167<H>  3.7   |  28  |  0.89    Ca    8.8      15 Gil 2019 06:24  Phos  3.3     01-15  Mg     2.0     01-15      Serum Pro-Brain Natriuretic Peptide: 2837 pg/mL (01-11 @ 20:46)      Cath:  < from: Cardiac Cath Lab - Adult (01.11.19 @ 17:59) >  CORONARYVESSELS: The coronary circulation is right dominant.  LM:   --  LM: Angiography showed minor luminal irregularities with no flow  limiting lesions.  LAD:   --  Proximal LAD: There was a 100 % stenosis.  CX:   --  Proximal circumflex: There was a tubular 95 % stenosis at the  site of a prior stent, in-stent.  RCA:   --  Proximal RCA: There was a 100 % stenosis.  --  RPDA: There was a tubular 90 % stenosis distal to the graft  anastomosis. The lesion was irregularly contoured, ulcerated, and complex.  --  RPLS: There was a 90 % stenosis.  GRAFTS:   --  Graft to the mid LAD: The graft was a LIMA. It was normal.  --  Graft to the 1st diagonal: The graft was a saphenous vein graft from  the aorta. Graft angiography showed minor luminal irregularities.  --  Graft to the RPDA: The graft was a saphenous vein graft from the aorta.  It was normal.  COMPLICATIONS: There were no complications.  DIAGNOSTIC IMPRESSIONS: Severe, new lesions in the Lcx (restenosis and  RPDA). High LVEDP    < end of copied text >      Interpretation of Telemetry: NSR 70-80s, PVC, trigeminy x1

## 2019-01-15 NOTE — PROGRESS NOTE ADULT - PROBLEM SELECTOR PLAN 2
-s/p  V. 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   -Optimize medical management and plan for PCI this week prior to dc. discussed with cards fellow  -Continue ASA/Plavix/Metoprolol/Statin  -TTE ordered to reassess EF  - Cardiology recs appreciated likely dependent edema. LUE sono negative for dvt  keep arm elevated  tylenol prn  avoid IV lines and blood draws this arm  can consider ace bandage if remains swollen

## 2019-01-15 NOTE — PROGRESS NOTE ADULT - PROBLEM SELECTOR PLAN 1
likely dependent edema. LUE sono negative for dvt  keep arm elevated  tylenol prn  avoid IV lines and blood draws this arm  can consider ace bandage if remains swollen -likely Hospital acquired; other work up non revealing including CT head  -Continue to monitor for infection  -out of bed to chair, frequent reorientation  -Afebrile, low suspicion for infection  -increasing word finding difficulty today and worsening L. sided weakness. Will repeat CT head and consult neurology to evaluate. Concern for hypoxic brain injury following cardiac arrest/20 mins cpr  -discussed with daughter chito on phone-- she has noticed her dad has worsening mental status and increased difficulty in answering questions. his seems to be continuing to deteriorate mentally.

## 2019-01-15 NOTE — PROGRESS NOTE ADULT - PROBLEM SELECTOR PLAN 8
-Chronic, BPs running high  -add losartan 50mg daily to regimen today (pt on this at home)  -Monitor vitals

## 2019-01-15 NOTE — PROGRESS NOTE ADULT - ATTENDING COMMENTS
78 year old man with cardiac arrest, severe compromise 2 vessel distributions, RCA and circumflex with patent LIMA to LAD. Optimizing medical management, compensation of CHF then plan percutaneous coronary interventions. Subsequent plan for ICD placement.

## 2019-01-15 NOTE — PROGRESS NOTE ADULT - ASSESSMENT
78M CAD s/p 4vCABG 1997 and multiple PCIs, HTN, PUD, DM2, HFrEF transfer from [facility] after unwitnessed fall with VFib arrest requiring 20 min CPR, extubated on 1/9 to BiPAP with hospital course complicated by aspiration PNA. Transferred to [facility] for LHC, found to have severe flow limiting lesions in Cx and RPDA, planned for revascularization after medication optimization and compensation of CHF.    #VF arrest/NSTEMI  - continue asa/plavix, statin  - continue amiodarone and Toprol XL  - No ICD indicated at this time per EP - Waiting time after MI is 40 days if no intervention or 3 months after PCI  - Discussed with Dr. Roberts, will plan for cath when medically optimized and near discharge. Per chart, PT eval pending and patient may go to LTC. Please let cardiology know when discharge dispo is more concrete     #HFrEF  - continue lasix 40 mg daily  - cont BB  - monitor I/Os, daily weights  - TTE pending    SLY Reeves  Cardiology Fellow  [id number]

## 2019-01-15 NOTE — CONSULT NOTE ADULT - SUBJECTIVE AND OBJECTIVE BOX
Neurology Consult    Reason for consult: AMS    79 y/o male with PMHx of CAD with s/p CABG 4v in 1997, multiple stents, HTN, PUD, Gout, DM2 presented to [facility] on Dec 31st after unwitnessed fall. Neurology consulted for AMS. As per provider, patient w/ STEMI, Vfib, ROSC after 20 minutes at [facility], transferred to [facility] for further evaluation. Was confused over the past few days, word finding difficulty(unable to recall pen, other words); as per family, not currently at mental baseline.     REVIEW OF SYSTEMS:  As above    MEDICATIONS  acetaminophen   Tablet .. 650 milliGRAM(s) Oral every 6 hours PRN  allopurinol 300 milliGRAM(s) Oral daily  amiodarone    Tablet 200 milliGRAM(s) Oral daily  aspirin enteric coated 81 milliGRAM(s) Oral daily  atorvastatin 40 milliGRAM(s) Oral at bedtime  chlorhexidine 4% Liquid 1 Application(s) Topical <User Schedule>  cholecalciferol 2000 Unit(s) Oral daily  clopidogrel Tablet 75 milliGRAM(s) Oral daily  dextrose 40% Gel 15 Gram(s) Oral once PRN  dextrose 5%. 1000 milliLiter(s) IV Continuous <Continuous>  dextrose 50% Injectable 12.5 Gram(s) IV Push once  dextrose 50% Injectable 25 Gram(s) IV Push once  dextrose 50% Injectable 25 Gram(s) IV Push once  furosemide    Tablet 40 milliGRAM(s) Oral daily  glucagon  Injectable 1 milliGRAM(s) IntraMuscular once PRN  heparin  Injectable 5000 Unit(s) SubCutaneous every 8 hours  insulin lispro (HumaLOG) corrective regimen sliding scale   SubCutaneous three times a day before meals  insulin lispro (HumaLOG) corrective regimen sliding scale   SubCutaneous at bedtime  losartan 50 milliGRAM(s) Oral daily  metoprolol succinate ER 50 milliGRAM(s) Oral daily  nystatin    Suspension [id number] Unit(s) Oral four times a day  pantoprazole    Tablet 40 milliGRAM(s) Oral before breakfast      PMH: MI (myocardial infarction)  HTN (hypertension)  HLD (hyperlipidemia)  DM (diabetes mellitus)  CAD (coronary artery disease)       PSH: S/P primary angioplasty with coronary stent  S/P CABG x 4      FAMILY HISTORY:    No history of dementia, strokes, or seizures     SOCIAL HISTORY:  No history of tobacco or alcohol use     Allergies    No Known Allergies    Intolerances    Vital Signs Last 24 Hrs  T(C): 36.5 (15 Gil 2019 14:44), Max: 37.1 (15 Gil 2019 04:42)  T(F): 97.7 (15 Gil 2019 14:44), Max: 98.8 (15 Gil 2019 04:42)  HR: 80 (15 Gil 2019 14:44) (71 - 80)  BP: 134/68 (15 Gil 2019 14:44) (134/68 - 163/80)  BP(mean): --  RR: 16 (15 Gil 2019 14:44) (16 - 18)  SpO2: 96% (15 Gil 2019 14:44) (94% - 96%)    Neurological Examination:    Mental Status: Patient is alert, awake, oriented X3. Patient is fluent, no dysarthria, no aphasia. Follows commands well and able to answer questions appropriately. Mood and affect normal.    Cranial Nerves: PERRL, EOMI, visual field intact, V1-V3 intact, no gross facial asymmetry, tongue/uvula midline    Motor Exam: No drift  Right upper extremity: 5/5  Left upper extremity: 5/5  Right lower extremity: 5/5  Left lower extremity: 5/5    Normal bulk/tone    Sensory: Intact to light touch and pinprick bilaterally. No extinction    Coordination: Finger to nose intact bilaterally     Gait: Normal      Reflexes: Bilateral 2+ Biceps, Brachial, Patellar, Ankle  Plantar: Down bilateral    GENERAL: No acute distress  HEENT:  Normocephalic, atraumatic  EXTREMITIES: No edema, clubbing, cyanosis  MUSCULOSKELETAL: Normal range of motion  SKIN: No rashes    LABS:  CBC Full  -  ( 15 Gil 2019 06:24 )  WBC Count : 5.0 K/uL  Hemoglobin : 11.9 g/dL  Hematocrit : 35.6 %  Platelet Count - Automated : 114 K/uL  Mean Cell Volume : 99.5 fl  Mean Cell Hemoglobin : 33.4 pg  Mean Cell Hemoglobin Concentration : 33.6 gm/dL  Auto Neutrophil # : x  Auto Lymphocyte # : x  Auto Monocyte # : x  Auto Eosinophil # : x  Auto Basophil # : x  Auto Neutrophil % : x  Auto Lymphocyte % : x  Auto Monocyte % : x  Auto Eosinophil % : x  Auto Basophil % : x    01-15    142  |  103  |  23  ----------------------------<  167<H>  3.7   |  28  |  0.89    Ca    8.8      15 Gil 2019 06:24  Phos  3.3     01-15  Mg     2.0     01-15    Hemoglobin A1C:     RADIOLOGY:  CT head:  MRI: Neurology Consult    Reason for consult: AMS    79 y/o  male with PMHx of CAD with s/p CABG 4v in 1997, multiple stents, HTN, PUD, Gout, DM2 presented to [facility] on Dec 31st after unwitnessed fall. Neurology consulted for AMS. As per provider, patient w/ STEMI, Vfib, ROSC after 20 minutes at [facility] on 1/1, transferred to [facility] for further evaluation where he received a cardiac cath. Patient extubated over a week ago; as per family at bedside, patient has had persistent confusion, hypophonic speech after; previous baseline prior to event was AOx3, MRS 0.     REVIEW OF SYSTEMS:  As above    MEDICATIONS  acetaminophen   Tablet .. 650 milliGRAM(s) Oral every 6 hours PRN  allopurinol 300 milliGRAM(s) Oral daily  amiodarone    Tablet 200 milliGRAM(s) Oral daily  aspirin enteric coated 81 milliGRAM(s) Oral daily  atorvastatin 40 milliGRAM(s) Oral at bedtime  chlorhexidine 4% Liquid 1 Application(s) Topical <User Schedule>  cholecalciferol 2000 Unit(s) Oral daily  clopidogrel Tablet 75 milliGRAM(s) Oral daily  dextrose 40% Gel 15 Gram(s) Oral once PRN  dextrose 5%. 1000 milliLiter(s) IV Continuous <Continuous>  dextrose 50% Injectable 12.5 Gram(s) IV Push once  dextrose 50% Injectable 25 Gram(s) IV Push once  dextrose 50% Injectable 25 Gram(s) IV Push once  furosemide    Tablet 40 milliGRAM(s) Oral daily  glucagon  Injectable 1 milliGRAM(s) IntraMuscular once PRN  heparin  Injectable 5000 Unit(s) SubCutaneous every 8 hours  insulin lispro (HumaLOG) corrective regimen sliding scale   SubCutaneous three times a day before meals  insulin lispro (HumaLOG) corrective regimen sliding scale   SubCutaneous at bedtime  losartan 50 milliGRAM(s) Oral daily  metoprolol succinate ER 50 milliGRAM(s) Oral daily  nystatin    Suspension [id number] Unit(s) Oral four times a day  pantoprazole    Tablet 40 milliGRAM(s) Oral before breakfast      PMH: MI (myocardial infarction)  HTN (hypertension)  HLD (hyperlipidemia)  DM (diabetes mellitus)  CAD (coronary artery disease)       PSH: S/P primary angioplasty with coronary stent  S/P CABG x 4      FAMILY HISTORY:    No history of dementia, strokes, or seizures     SOCIAL HISTORY:  No history of tobacco or alcohol use     Allergies    No Known Allergies    Intolerances    Vital Signs Last 24 Hrs  T(C): 36.5 (15 Gil 2019 14:44), Max: 37.1 (15 Gil 2019 04:42)  T(F): 97.7 (15 Gil 2019 14:44), Max: 98.8 (15 Gil 2019 04:42)  HR: 80 (15 Gil 2019 14:44) (71 - 80)  BP: 134/68 (15 Gil 2019 14:44) (134/68 - 163/80)  BP(mean): --  RR: 16 (15 Gil 2019 14:44) (16 - 18)  SpO2: 96% (15 Gil 2019 14:44) (94% - 96%)    Neurological Examination:    Mental Status: Patient is alert, awake, oriented X1. Follows some commands, answers questions partially. Voice hypophonic but no apparent dysarthria/aphasia, exhibiting some word finding difficulties.     Cranial Nerves: PERRL, EOMI, Left field cut(exam impaired by patient not responding to questions. V1-V3 intact, no gross facial asymmetry, tongue/uvula midline    Motor Exam: No drift  Right upper extremity: 4/5, able to lift off bed >10 secs, very coarse tremor  Left upper extremity: 4-/5, able to lift off bed >10 secs, very coarse tremor  Right lower extremity: 2/5   Left lower extremity: 2/5    Normal bulk/tone    Sensory: Intact to light touch bilaterally. No extinction    Coordination: not able/willing to participate    Gait: not able/willing to participate    Reflexes: Bilateral 2+ Biceps, Brachial, Patellar, Ankle  Plantar: Down bilateral    GENERAL: No acute distress  HEENT:  Normocephalic, atraumatic  EXTREMITIES: No edema, clubbing, cyanosis  MUSCULOSKELETAL: Normal range of motion  SKIN: No rashes    LABS:  CBC Full  -  ( 15 Gil 2019 06:24 )  WBC Count : 5.0 K/uL  Hemoglobin : 11.9 g/dL  Hematocrit : 35.6 %  Platelet Count - Automated : 114 K/uL  Mean Cell Volume : 99.5 fl  Mean Cell Hemoglobin : 33.4 pg  Mean Cell Hemoglobin Concentration : 33.6 gm/dL  Auto Neutrophil # : x  Auto Lymphocyte # : x  Auto Monocyte # : x  Auto Eosinophil # : x  Auto Basophil # : x  Auto Neutrophil % : x  Auto Lymphocyte % : x  Auto Monocyte % : x  Auto Eosinophil % : x  Auto Basophil % : x    01-15    142  |  103  |  23  ----------------------------<  167<H>  3.7   |  28  |  0.89    Ca    8.8      15 Gil 2019 06:24  Phos  3.3     01-15  Mg     2.0     01-15    Hemoglobin A1C:     RADIOLOGY:  CT head:  MRI:

## 2019-01-15 NOTE — PROGRESS NOTE ADULT - ASSESSMENT
79yo male with hx of CAD with s/p CABG 4v in 1997, multiple stents, HTN, PUD, Gout, DM2, and Acute HFrEF (EF 30-35%), presented to [facility] from [facility] for NSTEMI management after stabilization from complications of V. Fib arrest s/p ROSC after 20mins of CPR and ICU stay complicated by sepsis secondary to Aspiration PNA. Pt s/p Cardiac cath at [facility] revealing multiple vessel disease without any intervention, recommending medical optimization and stabilization prior to PCI. Also recommendation for EP eval for AICD placement.

## 2019-01-15 NOTE — PROGRESS NOTE ADULT - PROBLEM SELECTOR PLAN 4
-likely Hospital acquired; other work up non revealing including CT head  -Continue to monitor for infection  -out of bed to chair, frequent reorientation  -Afebrile -Saturating well on RA  -TTE EF 30-35% from outside hospital   -order TTE here  -change to lasix 40mg daily PO  -Daily weights and Strict I/Os  -per EP no AICD at this time, first medical management. awaiting possible PCI  -Continue to monitor respiratory status  -KCL 40 given again today

## 2019-01-15 NOTE — CHART NOTE - NSCHARTNOTEFT_GEN_A_CORE
Notified by PT regarding L side weakness.     Vital Signs Last 24 Hrs  T(C): 37.1 (15 Gil 2019 09:10), Max: 37.1 (15 Gil 2019 04:42)  T(F): 98.7 (15 Gil 2019 09:10), Max: 98.8 (15 Gil 2019 04:42)  HR: 79 (15 Gil 2019 13:40) (71 - 79)  BP: 163/80 (15 Gil 2019 13:40) (145/65 - 163/80)  BP(mean): --  RR: 18 (15 Gil 2019 13:40) (18 - 18)  SpO2: 96% (15 Gil 2019 13:40) (94% - 96%)    01-15    142  |  103  |  23  ----------------------------<  167<H>  3.7   |  28  |  0.89    Ca    8.8      15 Gil 2019 06:24  Phos  3.3     01-15  Mg     2.0     01-15                            11.9   5.0   )-----------( 114      ( 15 Gil 2019 06:24 )             35.6             Radiology :    MEDICATIONS  (STANDING):  amiodarone    Tablet 200 milliGRAM(s) Oral daily  aspirin enteric coated 81 milliGRAM(s) Oral daily  atorvastatin 40 milliGRAM(s) Oral at bedtime  chlorhexidine 4% Liquid 1 Application(s) Topical <User Schedule>  cholecalciferol 2000 Unit(s) Oral daily  clopidogrel Tablet 75 milliGRAM(s) Oral daily  dextrose 5%. 1000 milliLiter(s) (50 mL/Hr) IV Continuous <Continuous>  dextrose 50% Injectable 12.5 Gram(s) IV Push once  dextrose 50% Injectable 25 Gram(s) IV Push once  dextrose 50% Injectable 25 Gram(s) IV Push once  furosemide    Tablet 40 milliGRAM(s) Oral daily  heparin  Injectable 5000 Unit(s) SubCutaneous every 8 hours  insulin lispro (HumaLOG) corrective regimen sliding scale   SubCutaneous three times a day before meals  insulin lispro (HumaLOG) corrective regimen sliding scale   SubCutaneous at bedtime  metoprolol succinate ER 50 milliGRAM(s) Oral daily  nystatin    Suspension 372583 Unit(s) Oral four times a day  pantoprazole    Tablet 40 milliGRAM(s) Oral before breakfast    MEDICATIONS  (PRN):  acetaminophen   Tablet .. 650 milliGRAM(s) Oral every 6 hours PRN Mild Pain (1 - 3), Moderate Pain (4 - 6)  dextrose 40% Gel 15 Gram(s) Oral once PRN Blood Glucose LESS THAN 70 milliGRAM(s)/deciliter  glucagon  Injectable 1 milliGRAM(s) IntraMuscular once PRN Glucose LESS THAN 70 milligrams/deciliter      PHYSICAL EXAM:  Constitutional:  NAD  Neurological: Alert and oriented x 2   Respiratory: b/l basilar crackles  Cardiovascular:  S1, S2 RRR  Gastrointestinal:  non-distended, soft, nontender,   Extremities:  chronic venous stasis BLE, significant LUE edema     A/P  HPI:  77 y/o male with PMHx of CAD with s/p CABG 4v in 1997, multiple stents, HTN, PUD, Gout, DM2 presented to [facility] on Dec 31st after unwitnessed fall. According to family, patient lives alone and they believe they found him on the floor on the second day. Patient got admitted to [facility] on telemetry floor to r/o mechanical vs Arrhythmogenic syncope. While admitted on the floor patient had elevated troponin  followed by V. Fib arrest with full code, returned to rhythm after 20 minutes of CPR,  intubated and moved to ICU. Patient got self extubated two days ago and was put on BiPAP, which is tolerating well. Patient at present is oriented to self and immediate family, but unaware of the time and situation. Patient had an Echo done on 12/31 with EF of 60% and repeat TTE on 1/2 showed EF of30-35%. Was diagnosed with septic shock and was on pressors and antibiotics ( course completed as per documents) Patient had  CHF and aspiration PNA and elevated troponin during the hospital stay. Transferred to [facility] for cardiac evaluation; cardiac cath and possible AICD placement. On BiPAP with 35% with saturation of 100%. Patient is DNR but rescinded for cath.  CT chest on 1/8:mils b/l pleural effusion with adjacent atelectasis. Right mid lung ground glass opacity  GFR 58 from 1/8, Creat: 1 from 1/10, H/H: 10.3/33.8, PLT: 127  Patient has +3 edema on the Left UE and un stageable Pressure ulcer on the sacrum (11 Jan 2019 14:11)      Carmel Arevalo PA-C Notified by PT regarding L side weakness. Patient was seen and examined at the bedside with presence of his wife. Patient in no distress.    Vital Signs Last 24 Hrs  T(C): 37.1 (15 Gil 2019 09:10), Max: 37.1 (15 Gil 2019 04:42)  T(F): 98.7 (15 Gil 2019 09:10), Max: 98.8 (15 Gil 2019 04:42)  HR: 79 (15 Gil 2019 13:40) (71 - 79)  BP: 163/80 (15 Gil 2019 13:40) (145/65 - 163/80)  BP(mean): --  RR: 18 (15 Gil 2019 13:40) (18 - 18)  SpO2: 96% (15 Gil 2019 13:40) (94% - 96%)    01-15    142  |  103  |  23  ----------------------------<  167<H>  3.7   |  28  |  0.89    Ca    8.8      15 Gil 2019 06:24  Phos  3.3     01-15  Mg     2.0     01-15                            11.9   5.0   )-----------( 114      ( 15 Gil 2019 06:24 )             35.6       Radiology : pending CT brain     MEDICATIONS  (STANDING):  amiodarone    Tablet 200 milliGRAM(s) Oral daily  aspirin enteric coated 81 milliGRAM(s) Oral daily  atorvastatin 40 milliGRAM(s) Oral at bedtime  chlorhexidine 4% Liquid 1 Application(s) Topical <User Schedule>  cholecalciferol 2000 Unit(s) Oral daily  clopidogrel Tablet 75 milliGRAM(s) Oral daily  dextrose 5%. 1000 milliLiter(s) (50 mL/Hr) IV Continuous <Continuous>  dextrose 50% Injectable 12.5 Gram(s) IV Push once  dextrose 50% Injectable 25 Gram(s) IV Push once  dextrose 50% Injectable 25 Gram(s) IV Push once  furosemide    Tablet 40 milliGRAM(s) Oral daily  heparin  Injectable 5000 Unit(s) SubCutaneous every 8 hours  insulin lispro (HumaLOG) corrective regimen sliding scale   SubCutaneous three times a day before meals  insulin lispro (HumaLOG) corrective regimen sliding scale   SubCutaneous at bedtime  metoprolol succinate ER 50 milliGRAM(s) Oral daily  nystatin    Suspension 812660 Unit(s) Oral four times a day  pantoprazole    Tablet 40 milliGRAM(s) Oral before breakfast    MEDICATIONS  (PRN):  acetaminophen   Tablet .. 650 milliGRAM(s) Oral every 6 hours PRN Mild Pain (1 - 3), Moderate Pain (4 - 6)  dextrose 40% Gel 15 Gram(s) Oral once PRN Blood Glucose LESS THAN 70 milliGRAM(s)/deciliter  glucagon  Injectable 1 milliGRAM(s) IntraMuscular once PRN Glucose LESS THAN 70 milligrams/deciliter      PHYSICAL EXAM:  Constitutional:  NAD  Neurological: Alert and oriented x 2, CN II-XII intact, difficulty finding word to express himself, LUE strength 2/5  Respiratory: b/l basilar crackles  Cardiovascular:  S1, S2 RRR  Gastrointestinal:  non-distended, soft, nontender,   Extremities:  chronic venous stasis BLE, significant LUE edema     A/P  HPI:  77 y/o male with PMHx of CAD with s/p CABG 4v in 1997, multiple stents, HTN, PUD, Gout, DM2 presented to [facility] on Dec 31st after unwitnessed fall. During the admission at [facility], patient had V.fib arrest for 20 min s/p CPR on 1/1/19. Patient now presents with L side weakness and blurry vision.    Plan:  Dr. Khan aware  CT brain STAT  Neuro consult  continue on tele  Will closely monitor him w/ symptoms     Carmel Arevalo PA-C

## 2019-01-15 NOTE — PROGRESS NOTE ADULT - SUBJECTIVE AND OBJECTIVE BOX
Patient is a 78y old  Male who presents with a chief complaint of VT arrest, CAD (14 Jan 2019 16:13)      SUBJECTIVE / OVERNIGHT EVENTS:    today pt without acute complaints except for being thirsty. per RN she noticed some difficulties with pt and thin liquids. pt has been on pureed diet since admission  pt denies cp, sob, abdominal pain.   remains somewhat confused but able to tell me his daughters name, and knows he is in hospital    MEDICATIONS  (STANDING):  amiodarone    Tablet 200 milliGRAM(s) Oral daily  aspirin enteric coated 81 milliGRAM(s) Oral daily  atorvastatin 40 milliGRAM(s) Oral at bedtime  chlorhexidine 4% Liquid 1 Application(s) Topical <User Schedule>  cholecalciferol 2000 Unit(s) Oral daily  clopidogrel Tablet 75 milliGRAM(s) Oral daily  dextrose 5%. 1000 milliLiter(s) (50 mL/Hr) IV Continuous <Continuous>  dextrose 50% Injectable 12.5 Gram(s) IV Push once  dextrose 50% Injectable 25 Gram(s) IV Push once  dextrose 50% Injectable 25 Gram(s) IV Push once  furosemide    Tablet 40 milliGRAM(s) Oral daily  heparin  Injectable 5000 Unit(s) SubCutaneous every 8 hours  insulin lispro (HumaLOG) corrective regimen sliding scale   SubCutaneous three times a day before meals  insulin lispro (HumaLOG) corrective regimen sliding scale   SubCutaneous at bedtime  metoprolol succinate ER 50 milliGRAM(s) Oral daily  nystatin    Suspension 125370 Unit(s) Oral four times a day  pantoprazole    Tablet 40 milliGRAM(s) Oral before breakfast    MEDICATIONS  (PRN):  acetaminophen   Tablet .. 650 milliGRAM(s) Oral every 6 hours PRN Mild Pain (1 - 3), Moderate Pain (4 - 6)  dextrose 40% Gel 15 Gram(s) Oral once PRN Blood Glucose LESS THAN 70 milliGRAM(s)/deciliter  glucagon  Injectable 1 milliGRAM(s) IntraMuscular once PRN Glucose LESS THAN 70 milligrams/deciliter        CAPILLARY BLOOD GLUCOSE      POCT Blood Glucose.: 170 mg/dL (15 Gil 2019 12:55)  POCT Blood Glucose.: 149 mg/dL (15 Gil 2019 08:40)  POCT Blood Glucose.: 182 mg/dL (14 Jan 2019 21:36)  POCT Blood Glucose.: 137 mg/dL (14 Jan 2019 17:57)    I&O's Summary    14 Jan 2019 07:01  -  15 Jan 2019 07:00  --------------------------------------------------------  IN: 320 mL / OUT: 0 mL / NET: 320 mL      tele sinus 70-80s, PVCs, triplets  T 98.8, P 79, /80, R 18, O2 96% RA  PHYSICAL EXAM:  GENERAL: NAD  EYES: Pink conjunctiva and sclera clear  NECK: Supple  CHEST/LUNG: Good air entry b/l basilar crackles   HEART: S1 and S2 RR No murmurs, rubs, or gallops  ABDOMEN: Soft, Nontender, Nondistended; Bowel sounds present  EXTREMITIES: chronic venous stasis changes of LEs bilaterally. stage I pressure ulcers bilateral heels. significant LUE sweling.  PSYCH: AAOx1-2    LABS:                        11.9   5.0   )-----------( 114      ( 15 Gil 2019 06:24 )             35.6     01-15    142  |  103  |  23  ----------------------------<  167<H>  3.7   |  28  |  0.89    Ca    8.8      15 Gil 2019 06:24  Phos  3.3     01-15  Mg     2.0     01-15                RADIOLOGY & ADDITIONAL TESTS:    Imaging Personally Reviewed: LUE duplex: neg for DVT    Consultant(s) Notes Reviewed:  cards    Care Discussed with Consultants/Other Providers: cards fellow Patient is a 78y old  Male who presents with a chief complaint of VT arrest, CAD (14 Jan 2019 16:13)      SUBJECTIVE / OVERNIGHT EVENTS:    today pt without acute complaints except for being thirsty. per RN she noticed some difficulties with pt and thin liquids. pt has been on pureed diet since admission  pt denies cp, sob, abdominal pain.    able to tell me his daughters name, and knows he is in hospital  today pt with increasing word finding difficulty-- reassessed in afternoon around 4pm and pt was unable to name certain objects i held in front of him "pen" "glassess" etc. he was able to follow simple commands and no cranial nerve deficits were seen. able to tell me I was holding "two" or "three" fingers up.  pt was noted to have increased L. sided weakness today with PT      MEDICATIONS  (STANDING):  amiodarone    Tablet 200 milliGRAM(s) Oral daily  aspirin enteric coated 81 milliGRAM(s) Oral daily  atorvastatin 40 milliGRAM(s) Oral at bedtime  chlorhexidine 4% Liquid 1 Application(s) Topical <User Schedule>  cholecalciferol 2000 Unit(s) Oral daily  clopidogrel Tablet 75 milliGRAM(s) Oral daily  dextrose 5%. 1000 milliLiter(s) (50 mL/Hr) IV Continuous <Continuous>  dextrose 50% Injectable 12.5 Gram(s) IV Push once  dextrose 50% Injectable 25 Gram(s) IV Push once  dextrose 50% Injectable 25 Gram(s) IV Push once  furosemide    Tablet 40 milliGRAM(s) Oral daily  heparin  Injectable 5000 Unit(s) SubCutaneous every 8 hours  insulin lispro (HumaLOG) corrective regimen sliding scale   SubCutaneous three times a day before meals  insulin lispro (HumaLOG) corrective regimen sliding scale   SubCutaneous at bedtime  metoprolol succinate ER 50 milliGRAM(s) Oral daily  nystatin    Suspension 810770 Unit(s) Oral four times a day  pantoprazole    Tablet 40 milliGRAM(s) Oral before breakfast    MEDICATIONS  (PRN):  acetaminophen   Tablet .. 650 milliGRAM(s) Oral every 6 hours PRN Mild Pain (1 - 3), Moderate Pain (4 - 6)  dextrose 40% Gel 15 Gram(s) Oral once PRN Blood Glucose LESS THAN 70 milliGRAM(s)/deciliter  glucagon  Injectable 1 milliGRAM(s) IntraMuscular once PRN Glucose LESS THAN 70 milligrams/deciliter        CAPILLARY BLOOD GLUCOSE      POCT Blood Glucose.: 170 mg/dL (15 Gil 2019 12:55)  POCT Blood Glucose.: 149 mg/dL (15 Gil 2019 08:40)  POCT Blood Glucose.: 182 mg/dL (14 Jan 2019 21:36)  POCT Blood Glucose.: 137 mg/dL (14 Jan 2019 17:57)    I&O's Summary    14 Jan 2019 07:01  -  15 Gil 2019 07:00  --------------------------------------------------------  IN: 320 mL / OUT: 0 mL / NET: 320 mL      tele sinus 70-80s, PVCs, triplets  T 98.8, P 79, /80, R 18, O2 96% RA  PHYSICAL EXAM:  GENERAL: NAD  EYES: Pink conjunctiva and sclera clear  NECK: Supple  CHEST/LUNG: Good air entry b/l basilar crackles   HEART: S1 and S2 RR No murmurs, rubs, or gallops  ABDOMEN: Soft, Nontender, Nondistended; Bowel sounds present  EXTREMITIES: chronic venous stasis changes of LEs bilaterally. stage I pressure ulcers bilateral heels. significant LUE sweling.  PSYCH: AAOx1-2  NEURO: no focal deificts on cranial nerve exam- tongue midline, smile proportional PERRL. LUE strength 3-4/5 but noted LUE swelling. RUE strength 5/5    LABS:                        11.9   5.0   )-----------( 114      ( 15 Gil 2019 06:24 )             35.6     01-15    142  |  103  |  23  ----------------------------<  167<H>  3.7   |  28  |  0.89    Ca    8.8      15 Gil 2019 06:24  Phos  3.3     01-15  Mg     2.0     01-15                RADIOLOGY & ADDITIONAL TESTS:    Imaging Personally Reviewed: LUE duplex: neg for DVT    Consultant(s) Notes Reviewed:  cards    Care Discussed with Consultants/Other Providers: cards fellow

## 2019-01-15 NOTE — PROGRESS NOTE ADULT - PROBLEM SELECTOR PLAN 3
-Saturating well on RA  -TTE EF 30-35% from outside hospital   -order TTE here  -change to lasix 40mg daily PO  -Daily weights and Strict I/Os  -per EP no AICD at this time, first medical management. awaiting possible PCI  -Continue to monitor respiratory status  -KCL 40 given again today -s/p  V. 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   -Optimize medical management and plan for PCI this week prior to dc. discussed with cards fellow  -Continue ASA/Plavix/Metoprolol/Statin  -TTE ordered to reassess EF  - Cardiology recs appreciated

## 2019-01-15 NOTE — PROGRESS NOTE ADULT - PROBLEM SELECTOR PLAN 5
-s/p CABG  -NSTEMI c/b V Fib arrest s/p ROSC after 20mins of CPR.  -Continue Amiodarone  -cont medical management as above

## 2019-01-15 NOTE — CONSULT NOTE ADULT - ASSESSMENT
77 y/o male with PMHx of CAD with s/p CABG 4v in 1997, multiple stents, HTN, PUD, Gout, DM2 presented to [facility] on Dec 31st after unwitnessed fall. Neurology consulted for AMS. As per provider, patient w/ STEMI, Vfib, ROSC after 20 minutes at [facility], transferred to [facility] for further evaluation. Was confused over the past few days, word finding difficulty(unable to recall pen, other words); as per family, not currently at mental baseline.     Recs: 77 y/o male with PMHx of CAD with s/p CABG 4v in 1997, multiple stents, HTN, PUD, Gout, DM2 presented to [facility] on Dec 31st after unwitnessed fall. Neurology consulted for AMS. NIHSS 10(current eval) MRI head done at [facility] on 1/3 w/ mild to moderate ischemic in gliotic changes w/ ventricular dilatation, communicating hydrocephalus could not be ruled out. Given mental status changes were first noticed s/p extubation, AMS likely 2/2 to anoxic brain injury; patient would benefit from repeat imaging of brain to further characterize lesion.     Recs:  MRI head w/o contrast  MRA head w/o contrast/MRA neck w/ contrast  Toxic/metabolic workup

## 2019-01-15 NOTE — CONSULT NOTE ADULT - ATTENDING COMMENTS
Patient seen with residents. History reviewed. Neurological exam performed. 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 Will await MRI brain especially to ro focal pathology in view of left sided lower extremity weakness. Probable hypoxic brain injury.

## 2019-01-16 LAB
ANION GAP SERPL CALC-SCNC: 11 MMOL/L — SIGNIFICANT CHANGE UP (ref 5–17)
BUN SERPL-MCNC: 24 MG/DL — HIGH (ref 7–23)
CALCIUM SERPL-MCNC: 8.6 MG/DL — SIGNIFICANT CHANGE UP (ref 8.4–10.5)
CHLORIDE SERPL-SCNC: 103 MMOL/L — SIGNIFICANT CHANGE UP (ref 96–108)
CO2 SERPL-SCNC: 29 MMOL/L — SIGNIFICANT CHANGE UP (ref 22–31)
CREAT SERPL-MCNC: 0.95 MG/DL — SIGNIFICANT CHANGE UP (ref 0.5–1.3)
GLUCOSE BLDC GLUCOMTR-MCNC: 162 MG/DL — HIGH (ref 70–99)
GLUCOSE BLDC GLUCOMTR-MCNC: 167 MG/DL — HIGH (ref 70–99)
GLUCOSE BLDC GLUCOMTR-MCNC: 176 MG/DL — HIGH (ref 70–99)
GLUCOSE BLDC GLUCOMTR-MCNC: 190 MG/DL — HIGH (ref 70–99)
GLUCOSE SERPL-MCNC: 174 MG/DL — HIGH (ref 70–99)
HCT VFR BLD CALC: 35.4 % — LOW (ref 39–50)
HGB BLD-MCNC: 11.8 G/DL — LOW (ref 13–17)
MCHC RBC-ENTMCNC: 33.4 GM/DL — SIGNIFICANT CHANGE UP (ref 32–36)
MCHC RBC-ENTMCNC: 33.4 PG — SIGNIFICANT CHANGE UP (ref 27–34)
MCV RBC AUTO: 99.9 FL — SIGNIFICANT CHANGE UP (ref 80–100)
METHYLMALONATE SERPL-SCNC: 207 NMOL/L — SIGNIFICANT CHANGE UP (ref 0–378)
PLATELET # BLD AUTO: 109 K/UL — LOW (ref 150–400)
POTASSIUM SERPL-MCNC: 3.4 MMOL/L — LOW (ref 3.5–5.3)
POTASSIUM SERPL-SCNC: 3.4 MMOL/L — LOW (ref 3.5–5.3)
RBC # BLD: 3.54 M/UL — LOW (ref 4.2–5.8)
RBC # FLD: 13.6 % — SIGNIFICANT CHANGE UP (ref 10.3–14.5)
SODIUM SERPL-SCNC: 143 MMOL/L — SIGNIFICANT CHANGE UP (ref 135–145)
WBC # BLD: 4.8 K/UL — SIGNIFICANT CHANGE UP (ref 3.8–10.5)
WBC # FLD AUTO: 4.8 K/UL — SIGNIFICANT CHANGE UP (ref 3.8–10.5)

## 2019-01-16 PROCEDURE — 99233 SBSQ HOSP IP/OBS HIGH 50: CPT

## 2019-01-16 PROCEDURE — 99222 1ST HOSP IP/OBS MODERATE 55: CPT | Mod: GC

## 2019-01-16 PROCEDURE — 99233 SBSQ HOSP IP/OBS HIGH 50: CPT | Mod: GC

## 2019-01-16 PROCEDURE — 93306 TTE W/DOPPLER COMPLETE: CPT | Mod: 26

## 2019-01-16 RX ORDER — POTASSIUM CHLORIDE 20 MEQ
30 PACKET (EA) ORAL ONCE
Qty: 0 | Refills: 0 | Status: COMPLETED | OUTPATIENT
Start: 2019-01-16 | End: 2019-01-16

## 2019-01-16 RX ORDER — POTASSIUM CHLORIDE 20 MEQ
40 PACKET (EA) ORAL ONCE
Qty: 0 | Refills: 0 | Status: DISCONTINUED | OUTPATIENT
Start: 2019-01-16 | End: 2019-01-16

## 2019-01-16 RX ADMIN — Medication 50 MILLIGRAM(S): at 05:28

## 2019-01-16 RX ADMIN — Medication 500000 UNIT(S): at 05:28

## 2019-01-16 RX ADMIN — Medication 500000 UNIT(S): at 17:34

## 2019-01-16 RX ADMIN — HEPARIN SODIUM 5000 UNIT(S): 5000 INJECTION INTRAVENOUS; SUBCUTANEOUS at 13:50

## 2019-01-16 RX ADMIN — ATORVASTATIN CALCIUM 40 MILLIGRAM(S): 80 TABLET, FILM COATED ORAL at 21:28

## 2019-01-16 RX ADMIN — CLOPIDOGREL BISULFATE 75 MILLIGRAM(S): 75 TABLET, FILM COATED ORAL at 11:39

## 2019-01-16 RX ADMIN — Medication 30 MILLIEQUIVALENT(S): at 12:36

## 2019-01-16 RX ADMIN — Medication 40 MILLIGRAM(S): at 05:28

## 2019-01-16 RX ADMIN — Medication 81 MILLIGRAM(S): at 11:41

## 2019-01-16 RX ADMIN — Medication 2: at 09:21

## 2019-01-16 RX ADMIN — Medication 2: at 18:33

## 2019-01-16 RX ADMIN — Medication 500000 UNIT(S): at 11:42

## 2019-01-16 RX ADMIN — AMIODARONE HYDROCHLORIDE 200 MILLIGRAM(S): 400 TABLET ORAL at 05:28

## 2019-01-16 RX ADMIN — CHLORHEXIDINE GLUCONATE 1 APPLICATION(S): 213 SOLUTION TOPICAL at 05:59

## 2019-01-16 RX ADMIN — HEPARIN SODIUM 5000 UNIT(S): 5000 INJECTION INTRAVENOUS; SUBCUTANEOUS at 21:27

## 2019-01-16 RX ADMIN — LOSARTAN POTASSIUM 50 MILLIGRAM(S): 100 TABLET, FILM COATED ORAL at 05:28

## 2019-01-16 RX ADMIN — HEPARIN SODIUM 5000 UNIT(S): 5000 INJECTION INTRAVENOUS; SUBCUTANEOUS at 05:28

## 2019-01-16 RX ADMIN — Medication 300 MILLIGRAM(S): at 11:40

## 2019-01-16 RX ADMIN — PANTOPRAZOLE SODIUM 40 MILLIGRAM(S): 20 TABLET, DELAYED RELEASE ORAL at 05:28

## 2019-01-16 RX ADMIN — Medication 500000 UNIT(S): at 23:57

## 2019-01-16 RX ADMIN — Medication 2000 UNIT(S): at 11:41

## 2019-01-16 RX ADMIN — Medication 2: at 13:49

## 2019-01-16 NOTE — PROGRESS NOTE ADULT - PROBLEM SELECTOR PLAN 2
likely dependent edema. LUE sono negative for dvt  keep arm elevated  tylenol prn  avoid IV lines and blood draws this arm  improving

## 2019-01-16 NOTE — PROGRESS NOTE ADULT - SUBJECTIVE AND OBJECTIVE BOX
Patient seen and examined at bedside.    Overnight Events: Denies CP, SOB, LE edema. LHC postponed yesterday for concern for L sided weakness.       REVIEW OF SYSTEMS:  Constitutional:     No fevers, chills, weight loss, weight gain  HEENT:                  No dry eyes, nasal congestion, postnasal drip  CV:                         No chest pain, palpitations, orthopnea, PND  Resp:                     No cough, SOB, dyspnea, wheezing, sputum  GI:                          No nausea, vomiting, abdominal pain, diarrhea, constipation  :                        No dysuria, nocturia, hematuria, increased urinary frequency  Musculoskeletal: No back pain, myalgias, arthralgias   Skin:                       No rash, pruritus, ecchymoses  Neurological:        No headache, dizziness, syncope, weakness, numbness  Psychiatric:           No anxiety, depression   Endocrine:            No hot/cold intolerance, polydipsia  Heme/Lymph:      No bleeding, easy bruising  Allergic/Immune: No itchy eyes, rhinorrhea, hives angioedema      Current Meds:  acetaminophen   Tablet .. 650 milliGRAM(s) Oral every 6 hours PRN  allopurinol 300 milliGRAM(s) Oral daily  amiodarone    Tablet 200 milliGRAM(s) Oral daily  aspirin enteric coated 81 milliGRAM(s) Oral daily  atorvastatin 40 milliGRAM(s) Oral at bedtime  chlorhexidine 4% Liquid 1 Application(s) Topical <User Schedule>  cholecalciferol 2000 Unit(s) Oral daily  clopidogrel Tablet 75 milliGRAM(s) Oral daily  dextrose 40% Gel 15 Gram(s) Oral once PRN  dextrose 5%. 1000 milliLiter(s) IV Continuous <Continuous>  dextrose 50% Injectable 12.5 Gram(s) IV Push once  dextrose 50% Injectable 25 Gram(s) IV Push once  dextrose 50% Injectable 25 Gram(s) IV Push once  furosemide    Tablet 40 milliGRAM(s) Oral daily  glucagon  Injectable 1 milliGRAM(s) IntraMuscular once PRN  heparin  Injectable 5000 Unit(s) SubCutaneous every 8 hours  insulin lispro (HumaLOG) corrective regimen sliding scale   SubCutaneous three times a day before meals  insulin lispro (HumaLOG) corrective regimen sliding scale   SubCutaneous at bedtime  losartan 50 milliGRAM(s) Oral daily  metoprolol succinate ER 50 milliGRAM(s) Oral daily  nystatin    Suspension 995159 Unit(s) Oral four times a day  pantoprazole    Tablet 40 milliGRAM(s) Oral before breakfast      PAST MEDICAL & SURGICAL HISTORY:  MI (myocardial infarction)  HTN (hypertension)  HLD (hyperlipidemia)  DM (diabetes mellitus): A1c 7.1  CAD (coronary artery disease)  S/P primary angioplasty with coronary stent  S/P CABG x 4: 1997 by Dr. Smith      Vitals:  T(F): 99 (01-16), Max: 99 (01-16)  HR: 73 (01-16) (72 - 80)  BP: 144/78 (01-16) (134/68 - 163/80)  RR: 18 (01-16)  SpO2: 96% (01-16)  I&O's Summary    15 Gil 2019 07:01  -  16 Jan 2019 07:00  --------------------------------------------------------  IN: 120 mL / OUT: 600 mL / NET: -480 mL        Physical Exam:  Appearance: No acute distress   Eyes: PERRL, EOMI, pink conjunctiva  HENT: Normal oral mucosa  Cardiovascular: RRR, S1, S2, no murmurs, trace edema   Respiratory: Clear to auscultation bilaterally  Gastrointestinal: soft, non-tender, non-distended with normal bowel sounds  Musculoskeletal: No clubbing; no joint deformity   Neurologic: Non-focal  Lymphatic: No lymphadenopathy  Psychiatry: AAOx3, mood & affect appropriate  Skin: No rashes, ecchymoses, or cyanosis                          11.8   4.8   )-----------( 109      ( 16 Jan 2019 06:30 )             35.4     01-16    143  |  103  |  24<H>  ----------------------------<  174<H>  3.4<L>   |  29  |  0.95    Ca    8.6      16 Jan 2019 06:24  Phos  3.3     01-15  Mg     2.0     01-15            Serum Pro-Brain Natriuretic Peptide: 2837 pg/mL (01-11 @ 20:46)      Cath:  < from: Cardiac Cath Lab - Adult (01.11.19 @ 17:59) >  CORONARYVESSELS: The coronary circulation is right dominant.  LM:   --  LM: Angiography showed minor luminal irregularities with no flow  limiting lesions.  LAD:   --  Proximal LAD: There was a 100 % stenosis.  CX:   --  Proximal circumflex: There was a tubular 95 % stenosis at the  site of a prior stent, in-stent.  RCA:   --  Proximal RCA: There was a 100 % stenosis.  --  RPDA: There was a tubular 90 % stenosis distal to the graft  anastomosis. The lesion was irregularly contoured, ulcerated, and complex.  --  RPLS: There was a 90 % stenosis.  GRAFTS:   --  Graft to the mid LAD: The graft was a LIMA. It was normal.  --  Graft to the 1st diagonal: The graft was a saphenous vein graft from  the aorta. Graft angiography showed minor luminal irregularities.  --  Graft to the RPDA: The graft was a saphenous vein graft from the aorta.  It was normal.  COMPLICATIONS: There were no complications.  DIAGNOSTIC IMPRESSIONS: Severe, new lesions in the Lcx (restenosis and  RPDA). High LVEDP    < end of copied text >      Interpretation of Telemetry: NSR 70-80s, PVC, couplets

## 2019-01-16 NOTE — PROGRESS NOTE ADULT - ASSESSMENT
77yo male with hx of CAD with s/p CABG 4v in 1997, multiple stents, HTN, PUD, Gout, DM2, and Acute HFrEF (EF 30-35%), presented to [facility] from [facility] for NSTEMI management after stabilization from complications of V. Fib arrest s/p ROSC after 20mins of CPR and ICU stay complicated by sepsis secondary to Aspiration PNA. Pt s/p Cardiac cath at [facility] revealing multiple vessel disease without any intervention, recommending medical optimization and stabilization prior to PCI. Also recommendation for EP eval for AICD placement.

## 2019-01-16 NOTE — PROGRESS NOTE ADULT - ASSESSMENT
78M CAD s/p 4vCABG 1997 and multiple PCIs, HTN, PUD, DM2, HFrEF transfer from [facility] after unwitnessed fall with VFib arrest requiring 20 min CPR, extubated on 1/9 to BiPAP with hospital course complicated by aspiration PNA. Transferred to [facility] for LHC, found to have severe flow limiting lesions in Cx and RPDA, planned for revascularization after medication optimization and compensation of CHF.    #VF arrest/NSTEMI  - continue asa/plavix, statin  - continue amiodarone and Toprol XL  - No ICD indicated at this time per EP - Waiting time after MI is 40 days if no intervention or 3 months after PCI  - Will plan for [facility] when okay with neurology    #HFrEF  - continue lasix 40 mg PO daily  - cont BB  - monitor I/Os, daily weights  - Please obtain JAIDENE    SLY Reeves  Cardiology Fellow  [phone]

## 2019-01-16 NOTE — PROGRESS NOTE ADULT - ATTENDING COMMENTS
78 year old man with cardiac arrest, severe compromise 2 vessel distributions, RCA and circumflex with patent LIMA to LAD. Optimizing medical management, compensation of CHF. Now mental status more altered and possible focal weakness. Neurology evaluating and planned percutaneous coronary interventions necessarily deferred. Subsequent plan for ICD placement.

## 2019-01-16 NOTE — PROGRESS NOTE ADULT - PROBLEM SELECTOR PLAN 3
-s/p  V. 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   -Optimize medical management and plan for PCI this week prior to dc. discussed with cards fellow  -Continue ASA/Plavix/Metoprolol/Statin  -TTE ordered to reassess EF  - Cardiology recs appreciated

## 2019-01-16 NOTE — PROGRESS NOTE ADULT - PROBLEM SELECTOR PLAN 1
-acute encephalopathy likely multifactorial; concern for hypoxic brain injury + hospital acquired delerium  -Continue to monitor for infection  -out of bed to chair, frequent reorientation  -Afebrile, low suspicion for infection  -increasing word finding difficulty today and worsening L. sided weakness. Concern for hypoxic brain injury following cardiac arrest/20 mins cpr  -appreciate neuro eval- MR head, MRA head and neck   -EEG  -discussed with daughter chito on phone-- she has noticed her dad has worsening mental status and increased difficulty in answering questions. his seems to be continuing to deteriorate mentally.

## 2019-01-16 NOTE — PROGRESS NOTE ADULT - PROBLEM SELECTOR PLAN 4
-Saturating well on RA  -TTE EF 30-35% from outside hospital   -order TTE here  -change to lasix 40mg daily PO  -Daily weights and Strict I/Os  -per EP no AICD at this time, first medical management. awaiting possible PCI  -Continue to monitor respiratory status  -KCL 40 given again today

## 2019-01-16 NOTE — PROGRESS NOTE ADULT - SUBJECTIVE AND OBJECTIVE BOX
Patient is a 78y old  Male who presents with a chief complaint of cardiac arrest, cad (15 Gil 2019 14:08)      SUBJECTIVE / OVERNIGHT EVENTS:    no overnight events. pt aaox1-2 with continued word finding difficulties. no other acute complaints. no acute change in condition.     MEDICATIONS  (STANDING):  allopurinol 300 milliGRAM(s) Oral daily  amiodarone    Tablet 200 milliGRAM(s) Oral daily  aspirin enteric coated 81 milliGRAM(s) Oral daily  atorvastatin 40 milliGRAM(s) Oral at bedtime  chlorhexidine 4% Liquid 1 Application(s) Topical <User Schedule>  cholecalciferol 2000 Unit(s) Oral daily  clopidogrel Tablet 75 milliGRAM(s) Oral daily  dextrose 5%. 1000 milliLiter(s) (50 mL/Hr) IV Continuous <Continuous>  dextrose 50% Injectable 12.5 Gram(s) IV Push once  dextrose 50% Injectable 25 Gram(s) IV Push once  dextrose 50% Injectable 25 Gram(s) IV Push once  furosemide    Tablet 40 milliGRAM(s) Oral daily  heparin  Injectable 5000 Unit(s) SubCutaneous every 8 hours  insulin lispro (HumaLOG) corrective regimen sliding scale   SubCutaneous three times a day before meals  insulin lispro (HumaLOG) corrective regimen sliding scale   SubCutaneous at bedtime  losartan 50 milliGRAM(s) Oral daily  metoprolol succinate ER 50 milliGRAM(s) Oral daily  nystatin    Suspension 631949 Unit(s) Oral four times a day  pantoprazole    Tablet 40 milliGRAM(s) Oral before breakfast    MEDICATIONS  (PRN):  acetaminophen   Tablet .. 650 milliGRAM(s) Oral every 6 hours PRN Mild Pain (1 - 3), Moderate Pain (4 - 6)  dextrose 40% Gel 15 Gram(s) Oral once PRN Blood Glucose LESS THAN 70 milliGRAM(s)/deciliter  glucagon  Injectable 1 milliGRAM(s) IntraMuscular once PRN Glucose LESS THAN 70 milligrams/deciliter        CAPILLARY BLOOD GLUCOSE      POCT Blood Glucose.: 162 mg/dL (16 Jan 2019 18:15)  POCT Blood Glucose.: 190 mg/dL (16 Jan 2019 13:10)  POCT Blood Glucose.: 176 mg/dL (16 Jan 2019 08:56)  POCT Blood Glucose.: 145 mg/dL (15 Gil 2019 21:52)    I&O's Summary    15 Gil 2019 07:01  -  16 Jan 2019 07:00  --------------------------------------------------------  IN: 120 mL / OUT: 600 mL / NET: -480 mL    16 Jan 2019 07:01  -  16 Jan 2019 21:19  --------------------------------------------------------  IN: 600 mL / OUT: 0 mL / NET: 600 mL      tele: NSR  PHYSICAL EXAM:  GENERAL: NAD  EYES: Pink conjunctiva and sclera clear  NECK: Supple  CHEST/LUNG: Good air entry , normal respiratory effort and rate, no rales   HEART: S1 and S2 RR No murmurs, rubs, or gallops  ABDOMEN: Soft, Nontender, Nondistended; Bowel sounds present  EXTREMITIES: chronic venous stasis changes of LEs bilaterally. stage I pressure ulcers bilateral heels. improved LUE swelling  PSYCH: AAOx1-2  NEURO: no focal deificts on cranial nerve exam- tongue midline, smile proportional PERRL. LUE strength -4/5 but noted LUE swelling. RUE strength 4/5    LABS:                        11.8   4.8   )-----------( 109      ( 16 Jan 2019 06:30 )             35.4     01-16    143  |  103  |  24<H>  ----------------------------<  174<H>  3.4<L>   |  29  |  0.95    Ca    8.6      16 Jan 2019 06:24  Phos  3.3     01-15  Mg     2.0     01-15                RADIOLOGY & ADDITIONAL TESTS:    Imaging Personally Reviewed:    Consultant(s) Notes Reviewed:      Care Discussed with Consultants/Other Providers:

## 2019-01-16 NOTE — PROGRESS NOTE ADULT - PROBLEM SELECTOR PLAN 8
-Chronic, BPs running high  -add losartan 50mg daily to regimen today (pt on this at home)  -Monitor vitals -resumed losartan 1/15  -Monitor vitals

## 2019-01-17 DIAGNOSIS — G93.1 ANOXIC BRAIN DAMAGE, NOT ELSEWHERE CLASSIFIED: ICD-10-CM

## 2019-01-17 LAB
ANION GAP SERPL CALC-SCNC: 12 MMOL/L — SIGNIFICANT CHANGE UP (ref 5–17)
BUN SERPL-MCNC: 23 MG/DL — SIGNIFICANT CHANGE UP (ref 7–23)
CALCIUM SERPL-MCNC: 8.9 MG/DL — SIGNIFICANT CHANGE UP (ref 8.4–10.5)
CHLORIDE SERPL-SCNC: 103 MMOL/L — SIGNIFICANT CHANGE UP (ref 96–108)
CO2 SERPL-SCNC: 27 MMOL/L — SIGNIFICANT CHANGE UP (ref 22–31)
CREAT SERPL-MCNC: 0.88 MG/DL — SIGNIFICANT CHANGE UP (ref 0.5–1.3)
GLUCOSE BLDC GLUCOMTR-MCNC: 171 MG/DL — HIGH (ref 70–99)
GLUCOSE BLDC GLUCOMTR-MCNC: 178 MG/DL — HIGH (ref 70–99)
GLUCOSE BLDC GLUCOMTR-MCNC: 178 MG/DL — HIGH (ref 70–99)
GLUCOSE BLDC GLUCOMTR-MCNC: 194 MG/DL — HIGH (ref 70–99)
GLUCOSE BLDC GLUCOMTR-MCNC: 216 MG/DL — HIGH (ref 70–99)
GLUCOSE BLDC GLUCOMTR-MCNC: 240 MG/DL — HIGH (ref 70–99)
GLUCOSE BLDC GLUCOMTR-MCNC: 252 MG/DL — HIGH (ref 70–99)
GLUCOSE SERPL-MCNC: 175 MG/DL — HIGH (ref 70–99)
HCT VFR BLD CALC: 36.3 % — LOW (ref 39–50)
HGB BLD-MCNC: 12.2 G/DL — LOW (ref 13–17)
MCHC RBC-ENTMCNC: 33.6 PG — SIGNIFICANT CHANGE UP (ref 27–34)
MCHC RBC-ENTMCNC: 33.7 GM/DL — SIGNIFICANT CHANGE UP (ref 32–36)
MCV RBC AUTO: 99.7 FL — SIGNIFICANT CHANGE UP (ref 80–100)
PLATELET # BLD AUTO: 96 K/UL — LOW (ref 150–400)
POTASSIUM SERPL-MCNC: 3.6 MMOL/L — SIGNIFICANT CHANGE UP (ref 3.5–5.3)
POTASSIUM SERPL-SCNC: 3.6 MMOL/L — SIGNIFICANT CHANGE UP (ref 3.5–5.3)
RBC # BLD: 3.64 M/UL — LOW (ref 4.2–5.8)
RBC # FLD: 13.3 % — SIGNIFICANT CHANGE UP (ref 10.3–14.5)
SODIUM SERPL-SCNC: 142 MMOL/L — SIGNIFICANT CHANGE UP (ref 135–145)
WBC # BLD: 4.7 K/UL — SIGNIFICANT CHANGE UP (ref 3.8–10.5)
WBC # FLD AUTO: 4.7 K/UL — SIGNIFICANT CHANGE UP (ref 3.8–10.5)

## 2019-01-17 PROCEDURE — 99233 SBSQ HOSP IP/OBS HIGH 50: CPT

## 2019-01-17 PROCEDURE — 70551 MRI BRAIN STEM W/O DYE: CPT | Mod: 26

## 2019-01-17 PROCEDURE — 95819 EEG AWAKE AND ASLEEP: CPT | Mod: 26

## 2019-01-17 PROCEDURE — 70547 MR ANGIOGRAPHY NECK W/O DYE: CPT | Mod: 26

## 2019-01-17 RX ORDER — INSULIN LISPRO 100/ML
2 VIAL (ML) SUBCUTANEOUS
Qty: 0 | Refills: 0 | Status: DISCONTINUED | OUTPATIENT
Start: 2019-01-17 | End: 2019-01-29

## 2019-01-17 RX ORDER — TAMSULOSIN HYDROCHLORIDE 0.4 MG/1
0.4 CAPSULE ORAL AT BEDTIME
Qty: 0 | Refills: 0 | Status: DISCONTINUED | OUTPATIENT
Start: 2019-01-17 | End: 2019-01-19

## 2019-01-17 RX ADMIN — Medication 500000 UNIT(S): at 23:32

## 2019-01-17 RX ADMIN — TAMSULOSIN HYDROCHLORIDE 0.4 MILLIGRAM(S): 0.4 CAPSULE ORAL at 23:28

## 2019-01-17 RX ADMIN — Medication 50 MILLIGRAM(S): at 05:10

## 2019-01-17 RX ADMIN — ATORVASTATIN CALCIUM 40 MILLIGRAM(S): 80 TABLET, FILM COATED ORAL at 23:28

## 2019-01-17 RX ADMIN — HEPARIN SODIUM 5000 UNIT(S): 5000 INJECTION INTRAVENOUS; SUBCUTANEOUS at 14:17

## 2019-01-17 RX ADMIN — Medication 6: at 14:30

## 2019-01-17 RX ADMIN — Medication 500000 UNIT(S): at 18:06

## 2019-01-17 RX ADMIN — CHLORHEXIDINE GLUCONATE 1 APPLICATION(S): 213 SOLUTION TOPICAL at 10:17

## 2019-01-17 RX ADMIN — Medication 2: at 18:06

## 2019-01-17 RX ADMIN — Medication 300 MILLIGRAM(S): at 10:14

## 2019-01-17 RX ADMIN — Medication 500000 UNIT(S): at 14:17

## 2019-01-17 RX ADMIN — Medication 2000 UNIT(S): at 10:15

## 2019-01-17 RX ADMIN — Medication 81 MILLIGRAM(S): at 10:14

## 2019-01-17 RX ADMIN — Medication 2: at 10:16

## 2019-01-17 RX ADMIN — HEPARIN SODIUM 5000 UNIT(S): 5000 INJECTION INTRAVENOUS; SUBCUTANEOUS at 05:10

## 2019-01-17 RX ADMIN — AMIODARONE HYDROCHLORIDE 200 MILLIGRAM(S): 400 TABLET ORAL at 05:10

## 2019-01-17 RX ADMIN — Medication 500000 UNIT(S): at 05:10

## 2019-01-17 RX ADMIN — PANTOPRAZOLE SODIUM 40 MILLIGRAM(S): 20 TABLET, DELAYED RELEASE ORAL at 05:10

## 2019-01-17 RX ADMIN — HEPARIN SODIUM 5000 UNIT(S): 5000 INJECTION INTRAVENOUS; SUBCUTANEOUS at 23:29

## 2019-01-17 RX ADMIN — Medication 2 UNIT(S): at 18:06

## 2019-01-17 RX ADMIN — CLOPIDOGREL BISULFATE 75 MILLIGRAM(S): 75 TABLET, FILM COATED ORAL at 10:15

## 2019-01-17 RX ADMIN — LOSARTAN POTASSIUM 50 MILLIGRAM(S): 100 TABLET, FILM COATED ORAL at 05:10

## 2019-01-17 RX ADMIN — Medication 40 MILLIGRAM(S): at 05:10

## 2019-01-17 NOTE — PROGRESS NOTE ADULT - ASSESSMENT
77yo male with hx of CAD with s/p CABG 4v in 1997, multiple stents, HTN, PUD, Gout, DM2, and Acute HFrEF (EF 30-35%), presented to [facility] from [facility] for NSTEMI management after stabilization from complications of V. Fib arrest s/p ROSC after 20mins of CPR and ICU stay complicated by sepsis secondary to Aspiration PNA. Pt s/p Cardiac cath at [facility] revealing multiple vessel disease without any intervention, recommending medical optimization and stabilization prior to PCI. 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 Neurology on case, pending MRI.

## 2019-01-17 NOTE — PROGRESS NOTE ADULT - PROBLEM SELECTOR PLAN 2
likely dependent edema. LUE sono negative for dvt  keep arm elevated  tylenol prn  avoid IV lines and blood draws this arm  improved

## 2019-01-17 NOTE — PROGRESS NOTE ADULT - SUBJECTIVE AND OBJECTIVE BOX
Podiatry pager #: [phone]/ [phone]    Patient is a 78y old  Male who presents with a chief complaint of cad (16 Jan 2019 21:19) Podiatry seen today for painful toenails      HPI:  79 y/o male with PMHx of CAD with s/p CABG 4v in 1997, multiple stents, HTN, PUD, Gout, DM2 presented to [facility] on Dec 31st after unwitnessed fall. According to family, patient lives alone and they believe they found him on the floor on the second day. Patient got admitted to [facility] on telemetry floor to r/o mechanical vs Arrhythmogenic syncope. While admitted on the floor patient had elevated troponin  followed by V. Fib arrest with full code, returned to rhythm after 20 minutes of CPR,  intubated and moved to ICU. Patient got self extubated two days ago and was put on BiPAP, which is tolerating well. Patient at present is oriented to self and immediate family, but unaware of the time and situation. Patient had an Echo done on 12/31 with EF of 60% and repeat TTE on 1/2 showed EF of30-35%. Was diagnosed with septic shock and was on pressors and antibiotics ( course completed as per documents) Patient had  CHF and aspiration PNA and elevated troponin during the hospital stay. Transferred to [facility] for cardiac evaluation; cardiac cath and possible AICD placement. On BiPAP with 35% with saturation of 100%. Patient is DNR but rescinded for cath.  CT chest on 1/8:mils b/l pleural effusion with adjacent atelectasis. Right mid lung ground glass opacity  GFR 58 from 1/8, Creat: 1 from 1/10, H/H: 10.3/33.8, PLT: 127  Patient has +3 edema on the Left UE and un stageable Pressure ulcer on the sacrum (11 Jan 2019 14:11)      PAST MEDICAL & SURGICAL HISTORY:  MI (myocardial infarction)  HTN (hypertension)  HLD (hyperlipidemia)  DM (diabetes mellitus): A1c 7.1  CAD (coronary artery disease)  S/P primary angioplasty with coronary stent  S/P CABG x 4: 1997 by Dr. Smith      MEDICATIONS  (STANDING):  allopurinol 300 milliGRAM(s) Oral daily  amiodarone    Tablet 200 milliGRAM(s) Oral daily  aspirin enteric coated 81 milliGRAM(s) Oral daily  atorvastatin 40 milliGRAM(s) Oral at bedtime  chlorhexidine 4% Liquid 1 Application(s) Topical <User Schedule>  cholecalciferol 2000 Unit(s) Oral daily  clopidogrel Tablet 75 milliGRAM(s) Oral daily  dextrose 5%. 1000 milliLiter(s) (50 mL/Hr) IV Continuous <Continuous>  dextrose 50% Injectable 12.5 Gram(s) IV Push once  dextrose 50% Injectable 25 Gram(s) IV Push once  dextrose 50% Injectable 25 Gram(s) IV Push once  furosemide    Tablet 40 milliGRAM(s) Oral daily  heparin  Injectable 5000 Unit(s) SubCutaneous every 8 hours  insulin lispro (HumaLOG) corrective regimen sliding scale   SubCutaneous three times a day before meals  insulin lispro (HumaLOG) corrective regimen sliding scale   SubCutaneous at bedtime  losartan 50 milliGRAM(s) Oral daily  metoprolol succinate ER 50 milliGRAM(s) Oral daily  nystatin    Suspension 489140 Unit(s) Oral four times a day  pantoprazole    Tablet 40 milliGRAM(s) Oral before breakfast    MEDICATIONS  (PRN):  acetaminophen   Tablet .. 650 milliGRAM(s) Oral every 6 hours PRN Mild Pain (1 - 3), Moderate Pain (4 - 6)  dextrose 40% Gel 15 Gram(s) Oral once PRN Blood Glucose LESS THAN 70 milliGRAM(s)/deciliter  glucagon  Injectable 1 milliGRAM(s) IntraMuscular once PRN Glucose LESS THAN 70 milligrams/deciliter      Allergies    No Known Allergies    Intolerances        VITALS:    Vital Signs Last 24 Hrs  T(C): 37 (17 Jan 2019 04:39), Max: 37.1 (16 Jan 2019 13:43)  T(F): 98.6 (17 Jan 2019 04:39), Max: 98.7 (16 Jan 2019 13:43)  HR: 77 (17 Jan 2019 04:39) (65 - 77)  BP: 156/80 (17 Jan 2019 04:39) (132/67 - 156/80)  BP(mean): --  RR: 18 (17 Jan 2019 04:39) (18 - 19)  SpO2: 96% (17 Jan 2019 04:39) (96% - 97%)    LABS:                          12.2   4.7   )-----------( 96       ( 17 Jan 2019 06:35 )             36.3       01-17    142  |  103  |  23  ----------------------------<  175<H>  3.6   |  27  |  0.88    Ca    8.9      17 Jan 2019 06:34        CAPILLARY BLOOD GLUCOSE      POCT Blood Glucose.: 167 mg/dL (16 Jan 2019 21:32)  POCT Blood Glucose.: 162 mg/dL (16 Jan 2019 18:15)  POCT Blood Glucose.: 190 mg/dL (16 Jan 2019 13:10)  POCT Blood Glucose.: 176 mg/dL (16 Jan 2019 08:56)          LOWER EXTREMITY PHYSICAL EXAM:    Vasular: DP/PT 1_/4, B/L, CFT <_2 seconds B/L, Temperature gradient wnl_, B/L.   Neuro: Epicritic sensation _intact to the level of toes, B/L.  Musculoskeletal/Ortho:  Skin: positive dystrophic, hypertrophic, brittle toenails with subungal debris x10 toes, no ulcerations/open lesions or clinical signs of infection

## 2019-01-17 NOTE — PROGRESS NOTE ADULT - PROBLEM SELECTOR PLAN 6
-Stable Hb at 11 with MCV> 100  - B12/Folate- ok -Stable Hb at 11 with MCV> 100  - B12/Folate- ok    NOTED THROMBOCYTOPENIA- unclear cause. if continues to drop will need to rule out HIT

## 2019-01-17 NOTE — EEG REPORT - NS EEG TEXT BOX
[facility]   COMPREHENSIVE EPILEPSY CENTER   REPORT OF ROUTINE VIDEO EEG     [facility]: [address] [place] [address] [address], [place] [address], Ph#: [phone]  [facility]: [address], [place] [address], Ph#: [phone]  Office: [address] [facility], [address], [place] [address] Ph#: [phone]    Patient Name: AUDIE SEGURA  Age and : 78y (38)  MRN #: [id number]  Location: [address]   Referring Physician: Jennifer Khan    Study Date: 19    _____________________________________________________________  TECHNICAL INFORMATION    Placement and Labeling of Electrodes:  The EEG was performed utilizing 20 channels referential EEG connections (coronal over temporal over parasagittal montage) using all standard 10-20 electrode placements with EKG.  Recording was at a sampling rate of 256 samples per second per channel.  Time synchronized digital video recording was done simultaneously with EEG recording.  A low light infrared camera was used for low light recording.  Dawson and seizure detection algorithms were utilized.    _____________________________________________________________  HISTORY    Patient is a 78y old  Male who presents with a chief complaint of cad (2019 21:19)      PERTINENT MEDICATION:  none  _____________________________________________________________  STUDY INTERPRETATION    Findings: The background was continuous, spontaneously variable and reactive. During wakefulness, the posterior dominant rhythm consisted of symmetric, well-modulated 8 Hz activity, with amplitude to 30 uV, that attenuated to eye opening.      Background Slowing:  No generalized background slowing was present.    Focal Slowing:   None were present.    Sleep Background:  Drowsiness was characterized by fragmentation, attenuation, and slowing of the background activity.    Stage II sleep transients were not recorded.    Other Non-Epileptiform Findings:  None were present.    Interictal Epileptiform Activity:   None were present.    Events:  Clinical events: None recorded.  Seizures: None recorded.    Activation Procedures:   Hyperventilation was not performed.    Photic stimulation was not performed.     Artifacts:  Intermittent myogenic and movement artifacts were noted.    ECG:  The heart rate on single channel ECG was predominantly between 70-80 BPM.    _____________________________________________________________  EEG SUMMARY/CLASSIFICATION    Mildly Abnormal EEG in the awake, drowsy states.    _____________________________________________________________  EEG IMPRESSION/CLINICAL CORRELATE    Mildly abnormal EEG study.  1. Mild nonspecific diffuse or multifocal cerebral dysfunction.   2. No epileptiform pattern or seizure seen.      Graham Barrera MD  Attending Physician, [facility]ile[facility] [place]

## 2019-01-17 NOTE — SWALLOW BEDSIDE ASSESSMENT ADULT - SLP PERTINENT HISTORY OF CURRENT PROBLEM
78M PMH CAD s/p CABG 4v in 1997, multiple stents, HTN, PUD, Gout, DM2 presented to [facility] on 12/31 after unwitnessed fall. Pt lives alone and they believe they found him on the floor on the second day. Pt got admitted to [facility] on telemetry floor to r/o mechanical vs Arrhythmogenic syncope. While admitted on the floor pt had elevated troponin followed by V. Fib arrest with full code, returned to rhythm after 20 minutes of CPR, intubated and moved to ICU. Pt self-extubated 2 days ago and was put on BiPAP, which is tolerating well. At present pt is oriented to self and immediate family, but unaware of the time and situation. Pt had an Echo done on 12/31 with EF of 60% and repeat TTE on 1/2 showed EF of30-35%. Was diagnosed with septic shock and was on pressors and abx (course completed as per documents) Pt had CHF and aspiration PNA and elevated troponin during the hospital stay. 78M with CAD with s/p CABG 4v in 1997, multiple stents, HTN, PUD, Gout, DM2 presented to [facility] on Dec 31st after unwitnessed fall 2/2 VT arrest, returned to rhythm after 20 minutes of CPR, intubated and moved to ICU. Self-extubated 1/9. Pt's [facility] course was c/b aspiration pna, pulmonary edema. He was found to have EF of 30-35% and was transferred to [facility] for cath. In CCU weaned off BiPAP to NC 1/11. Cath demonstrated 90 and 95% occlusion of l circ and rpda, respectively however unable to intervene on lesions. Pt is now pending EP eval for AICD and requiring IV lasix for diuresis as well as BiPAP overnight. Pt's daughter states his mental status changed since he fell. Pt reported falling on a Friday and was found on a Sunday. He has been confused since.

## 2019-01-17 NOTE — PROGRESS NOTE ADULT - PROBLEM SELECTOR PLAN 1
pt with increasing word finding abilities, visual impairment.   -likely cause of acute encephalopathy with possible other contributing factors (ICU delirium etc). Low suspicion for infection  -cont q4h neurochecks  -neuro consult appreciated  -out of bed to chair, frequent reorientation  -appreciate neuro eval- MR head, MRA head and neck   -concerned for cortical blindness. will also have optho eval  -EEG  -PT/OT  -speech/swallow

## 2019-01-17 NOTE — PROGRESS NOTE ADULT - SUBJECTIVE AND OBJECTIVE BOX
Patient is a 78y old  Male who presents with a chief complaint of cad (16 Jan 2019 21:19)      SUBJECTIVE / OVERNIGHT EVENTS:    no overnight events. pending MR head/MRA and EEG.  no change in condition since yesterday. pt without acute complaints.      MEDICATIONS  (STANDING):  allopurinol 300 milliGRAM(s) Oral daily  amiodarone    Tablet 200 milliGRAM(s) Oral daily  aspirin enteric coated 81 milliGRAM(s) Oral daily  atorvastatin 40 milliGRAM(s) Oral at bedtime  chlorhexidine 4% Liquid 1 Application(s) Topical <User Schedule>  cholecalciferol 2000 Unit(s) Oral daily  clopidogrel Tablet 75 milliGRAM(s) Oral daily  dextrose 5%. 1000 milliLiter(s) (50 mL/Hr) IV Continuous <Continuous>  dextrose 50% Injectable 12.5 Gram(s) IV Push once  dextrose 50% Injectable 25 Gram(s) IV Push once  dextrose 50% Injectable 25 Gram(s) IV Push once  furosemide    Tablet 40 milliGRAM(s) Oral daily  heparin  Injectable 5000 Unit(s) SubCutaneous every 8 hours  insulin lispro (HumaLOG) corrective regimen sliding scale   SubCutaneous three times a day before meals  insulin lispro (HumaLOG) corrective regimen sliding scale   SubCutaneous at bedtime  losartan 50 milliGRAM(s) Oral daily  metoprolol succinate ER 50 milliGRAM(s) Oral daily  nystatin    Suspension 632490 Unit(s) Oral four times a day  pantoprazole    Tablet 40 milliGRAM(s) Oral before breakfast    MEDICATIONS  (PRN):  acetaminophen   Tablet .. 650 milliGRAM(s) Oral every 6 hours PRN Mild Pain (1 - 3), Moderate Pain (4 - 6)  dextrose 40% Gel 15 Gram(s) Oral once PRN Blood Glucose LESS THAN 70 milliGRAM(s)/deciliter  glucagon  Injectable 1 milliGRAM(s) IntraMuscular once PRN Glucose LESS THAN 70 milligrams/deciliter        CAPILLARY BLOOD GLUCOSE      POCT Blood Glucose.: 252 mg/dL (17 Jan 2019 14:28)  POCT Blood Glucose.: 240 mg/dL (17 Jan 2019 14:19)  POCT Blood Glucose.: 216 mg/dL (17 Jan 2019 13:03)  POCT Blood Glucose.: 194 mg/dL (17 Jan 2019 10:13)  POCT Blood Glucose.: 178 mg/dL (17 Jan 2019 08:49)  POCT Blood Glucose.: 167 mg/dL (16 Jan 2019 21:32)  POCT Blood Glucose.: 162 mg/dL (16 Jan 2019 18:15)    I&O's Summary    16 Jan 2019 07:01  -  17 Jan 2019 07:00  --------------------------------------------------------  IN: 1400 mL / OUT: 0 mL / NET: 1400 mL    17 Jan 2019 07:01  -  17 Jan 2019 17:43  --------------------------------------------------------  IN: 480 mL / OUT: 0 mL / NET: 480 mL      T 98.6, P 77, /80, R 18, O2 96% RA  PHYSICAL EXAM:  GENERAL: NAD  EYES: Pink conjunctiva and sclera clear  NECK: Supple  CHEST/LUNG: Good air entry , normal respiratory effort and rate, no rales   HEART: S1 and S2 RR No murmurs, rubs, or gallops  ABDOMEN: Soft, Nontender, Nondistended; Bowel sounds present  EXTREMITIES: chronic venous stasis changes of LEs bilaterally. stage I pressure ulcers bilateral heels. improved LUE swelling  PSYCH: AAOx1-2  NEURO: no focal deificts on cranial nerve exam- tongue midline, smile proportional PERRL. LUE strength -4/5 but noted LUE swelling. RUE strength 4/5    LABS:                        12.2   4.7   )-----------( 96       ( 17 Jan 2019 06:35 )             36.3     01-17    142  |  103  |  23  ----------------------------<  175<H>  3.6   |  27  |  0.88    Ca    8.9      17 Jan 2019 06:34                RADIOLOGY & ADDITIONAL TESTS:      Consultant(s) Notes Reviewed:  neuro    Care Discussed with Consultants/Other Providers: medicine NP

## 2019-01-17 NOTE — PROGRESS NOTE ADULT - PROBLEM SELECTOR PLAN 7
-Chronic  -Continue SSI +Fingerstick  -not requiring basal insulin at this time -Chronic  -Continue SSI +Fingerstick  -add 2 units humalog premeal  -not requiring basal insulin at this time

## 2019-01-17 NOTE — PROGRESS NOTE ADULT - PROBLEM SELECTOR PLAN 3
-s/p  V. 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   -Optimize medical management and plan for PCI prior to dc. discussed with cards fellow  -Continue ASA/Plavix/Metoprolol/Statin  -TTE with EG 65%, severe MR. will compare to prior TTE  - Cardiology recs appreciated

## 2019-01-17 NOTE — PROGRESS NOTE ADULT - ASSESSMENT
Assessment/Plan    Onychomycosis secondary to dermatophytes    --aseptic debridemont of mycotic nails x 10 with betadine applied  --recommend continued dm routine foot care as outpatient  --thank you for consults

## 2019-01-17 NOTE — SWALLOW BEDSIDE ASSESSMENT ADULT - COMMENTS
HX contd: Transferred to [facility] for cardiac evaluation; cardiac cath and possible AICD placement. On BiPAP with 35% with saturation of 100%. Pt is DNR but rescinded for cath. Pt has +3 edema on the LUE and unstageable Pressure ulcer on sacrum. 1/15 Seen by Neuro, pt extubated over a week ago; as per family at bedside, pt has had persistent confusion, hypophonic speech after; previous baseline prior to event was AOx3, MRS 0. 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 Will await MRI brain especially to ro focal pathology in view of left sided lower extremity weakness. Probable hypoxic brain injury.    1/16 Per Med re delirium; pt w/ acute encephalopathy likely multifactorial; concern for hypoxic brain injury + hospital acquired delirium. Afebrile, low suspicion for infection. Increasing word finding difficulty today and worsening L. sided weakness. Concern for hypoxic brain injury following cardiac arrest/20 mins cpr. 1/17 rEEG: Mildly abnormal EEG study. 1. Mild nonspecific diffuse or multifocal cerebral dysfunction. 2. No epileptiform pattern or seizure seen.

## 2019-01-17 NOTE — PROGRESS NOTE ADULT - PROBLEM SELECTOR PLAN 4
-Saturating well on RA  -TTE EF 30-35% from outside hospital   -TTE with EF 65% consistent with HFpEF  -change to lasix 40mg daily PO  -Daily weights and Strict I/Os  -per EP no AICD at this time, first medical management. awaiting possible PCI  -Continue to monitor respiratory status

## 2019-01-18 LAB
ANION GAP SERPL CALC-SCNC: 11 MMOL/L — SIGNIFICANT CHANGE UP (ref 5–17)
BUN SERPL-MCNC: 23 MG/DL — SIGNIFICANT CHANGE UP (ref 7–23)
CALCIUM SERPL-MCNC: 8.7 MG/DL — SIGNIFICANT CHANGE UP (ref 8.4–10.5)
CHLORIDE SERPL-SCNC: 101 MMOL/L — SIGNIFICANT CHANGE UP (ref 96–108)
CO2 SERPL-SCNC: 27 MMOL/L — SIGNIFICANT CHANGE UP (ref 22–31)
CREAT SERPL-MCNC: 0.93 MG/DL — SIGNIFICANT CHANGE UP (ref 0.5–1.3)
GLUCOSE BLDC GLUCOMTR-MCNC: 178 MG/DL — HIGH (ref 70–99)
GLUCOSE BLDC GLUCOMTR-MCNC: 190 MG/DL — HIGH (ref 70–99)
GLUCOSE BLDC GLUCOMTR-MCNC: 210 MG/DL — HIGH (ref 70–99)
GLUCOSE BLDC GLUCOMTR-MCNC: 215 MG/DL — HIGH (ref 70–99)
GLUCOSE SERPL-MCNC: 204 MG/DL — HIGH (ref 70–99)
HCT VFR BLD CALC: 37.3 % — LOW (ref 39–50)
HGB BLD-MCNC: 12 G/DL — LOW (ref 13–17)
MAGNESIUM SERPL-MCNC: 1.9 MG/DL — SIGNIFICANT CHANGE UP (ref 1.6–2.6)
MCHC RBC-ENTMCNC: 32.1 GM/DL — SIGNIFICANT CHANGE UP (ref 32–36)
MCHC RBC-ENTMCNC: 32.2 PG — SIGNIFICANT CHANGE UP (ref 27–34)
MCV RBC AUTO: 100 FL — SIGNIFICANT CHANGE UP (ref 80–100)
PLATELET # BLD AUTO: 93 K/UL — LOW (ref 150–400)
POTASSIUM SERPL-MCNC: 3.5 MMOL/L — SIGNIFICANT CHANGE UP (ref 3.5–5.3)
POTASSIUM SERPL-SCNC: 3.5 MMOL/L — SIGNIFICANT CHANGE UP (ref 3.5–5.3)
RBC # BLD: 3.73 M/UL — LOW (ref 4.2–5.8)
RBC # FLD: 13.7 % — SIGNIFICANT CHANGE UP (ref 10.3–14.5)
SODIUM SERPL-SCNC: 139 MMOL/L — SIGNIFICANT CHANGE UP (ref 135–145)
WBC # BLD: 4.1 K/UL — SIGNIFICANT CHANGE UP (ref 3.8–10.5)
WBC # FLD AUTO: 4.1 K/UL — SIGNIFICANT CHANGE UP (ref 3.8–10.5)

## 2019-01-18 PROCEDURE — 99233 SBSQ HOSP IP/OBS HIGH 50: CPT

## 2019-01-18 PROCEDURE — 99232 SBSQ HOSP IP/OBS MODERATE 35: CPT

## 2019-01-18 PROCEDURE — 99221 1ST HOSP IP/OBS SF/LOW 40: CPT

## 2019-01-18 RX ORDER — SOD,AMMONIUM,POTASSIUM LACTATE
1 CREAM (GRAM) TOPICAL
Qty: 0 | Refills: 0 | Status: DISCONTINUED | OUTPATIENT
Start: 2019-01-18 | End: 2019-01-29

## 2019-01-18 RX ADMIN — CHLORHEXIDINE GLUCONATE 1 APPLICATION(S): 213 SOLUTION TOPICAL at 09:40

## 2019-01-18 RX ADMIN — HEPARIN SODIUM 5000 UNIT(S): 5000 INJECTION INTRAVENOUS; SUBCUTANEOUS at 22:12

## 2019-01-18 RX ADMIN — Medication 81 MILLIGRAM(S): at 09:38

## 2019-01-18 RX ADMIN — HEPARIN SODIUM 5000 UNIT(S): 5000 INJECTION INTRAVENOUS; SUBCUTANEOUS at 13:47

## 2019-01-18 RX ADMIN — CLOPIDOGREL BISULFATE 75 MILLIGRAM(S): 75 TABLET, FILM COATED ORAL at 09:40

## 2019-01-18 RX ADMIN — Medication 500000 UNIT(S): at 05:19

## 2019-01-18 RX ADMIN — LOSARTAN POTASSIUM 50 MILLIGRAM(S): 100 TABLET, FILM COATED ORAL at 05:17

## 2019-01-18 RX ADMIN — Medication 2: at 09:37

## 2019-01-18 RX ADMIN — Medication 40 MILLIGRAM(S): at 05:17

## 2019-01-18 RX ADMIN — Medication 500000 UNIT(S): at 18:29

## 2019-01-18 RX ADMIN — Medication 300 MILLIGRAM(S): at 09:38

## 2019-01-18 RX ADMIN — Medication 4: at 18:29

## 2019-01-18 RX ADMIN — Medication 2 UNIT(S): at 09:38

## 2019-01-18 RX ADMIN — Medication 2 UNIT(S): at 18:29

## 2019-01-18 RX ADMIN — AMIODARONE HYDROCHLORIDE 200 MILLIGRAM(S): 400 TABLET ORAL at 05:17

## 2019-01-18 RX ADMIN — HEPARIN SODIUM 5000 UNIT(S): 5000 INJECTION INTRAVENOUS; SUBCUTANEOUS at 05:17

## 2019-01-18 RX ADMIN — Medication 1 APPLICATION(S): at 22:12

## 2019-01-18 RX ADMIN — ATORVASTATIN CALCIUM 40 MILLIGRAM(S): 80 TABLET, FILM COATED ORAL at 22:12

## 2019-01-18 RX ADMIN — Medication 50 MILLIGRAM(S): at 05:17

## 2019-01-18 RX ADMIN — TAMSULOSIN HYDROCHLORIDE 0.4 MILLIGRAM(S): 0.4 CAPSULE ORAL at 22:12

## 2019-01-18 RX ADMIN — PANTOPRAZOLE SODIUM 40 MILLIGRAM(S): 20 TABLET, DELAYED RELEASE ORAL at 05:17

## 2019-01-18 RX ADMIN — Medication 500000 UNIT(S): at 13:47

## 2019-01-18 RX ADMIN — Medication 2000 UNIT(S): at 09:38

## 2019-01-18 RX ADMIN — Medication 2 UNIT(S): at 13:47

## 2019-01-18 RX ADMIN — Medication 4: at 13:46

## 2019-01-18 NOTE — OCCUPATIONAL THERAPY INITIAL EVALUATION ADULT - PERTINENT HX OF CURRENT PROBLEM, REHAB EVAL
79 y/o M presented to [facility] on Dec 31st after unwitnessed fall. According to family, pt lives alone and they believe they found him on the floor on the second day. Pt got admitted to [facility] on telemetry floor to r/o mechanical vs Arrhythmogenic syncope. While admitted on the floor pt had elevated troponin  followed by V. Fib arrest with full code, returned to rhythm after 20 minutes of CPR,  intubated and moved to ICU.

## 2019-01-18 NOTE — OCCUPATIONAL THERAPY INITIAL EVALUATION ADULT - MD ORDER
OT Initial Evaluation  Increase as Tolerated OT Initial Evaluation  Increase as Tolerated  Functional Evaluation 1/22

## 2019-01-18 NOTE — PROGRESS NOTE ADULT - SUBJECTIVE AND OBJECTIVE BOX
Patient is a 78y old  Male who presents with a chief complaint of ED (18 Jan 2019 12:03)      SUBJECTIVE / OVERNIGHT EVENTS:    no overnight events. pending MRI/MRA read (performed overnight). EEG with nonspecific cerebral dysfunction.  pt complains of pruritus of back today.   no new rash.   denies cp, sob. aaox1.   no recent changes in condition.      MEDICATIONS  (STANDING):  allopurinol 300 milliGRAM(s) Oral daily  amiodarone    Tablet 200 milliGRAM(s) Oral daily  aspirin enteric coated 81 milliGRAM(s) Oral daily  atorvastatin 40 milliGRAM(s) Oral at bedtime  chlorhexidine 4% Liquid 1 Application(s) Topical <User Schedule>  cholecalciferol 2000 Unit(s) Oral daily  clopidogrel Tablet 75 milliGRAM(s) Oral daily  dextrose 5%. 1000 milliLiter(s) (50 mL/Hr) IV Continuous <Continuous>  dextrose 50% Injectable 12.5 Gram(s) IV Push once  dextrose 50% Injectable 25 Gram(s) IV Push once  dextrose 50% Injectable 25 Gram(s) IV Push once  furosemide    Tablet 40 milliGRAM(s) Oral daily  heparin  Injectable 5000 Unit(s) SubCutaneous every 8 hours  insulin lispro (HumaLOG) corrective regimen sliding scale   SubCutaneous three times a day before meals  insulin lispro (HumaLOG) corrective regimen sliding scale   SubCutaneous at bedtime  insulin lispro Injectable (HumaLOG) 2 Unit(s) SubCutaneous three times a day before meals  losartan 50 milliGRAM(s) Oral daily  metoprolol succinate ER 50 milliGRAM(s) Oral daily  nystatin    Suspension 801247 Unit(s) Oral four times a day  pantoprazole    Tablet 40 milliGRAM(s) Oral before breakfast  tamsulosin 0.4 milliGRAM(s) Oral at bedtime    MEDICATIONS  (PRN):  acetaminophen   Tablet .. 650 milliGRAM(s) Oral every 6 hours PRN Mild Pain (1 - 3), Moderate Pain (4 - 6)  dextrose 40% Gel 15 Gram(s) Oral once PRN Blood Glucose LESS THAN 70 milliGRAM(s)/deciliter  glucagon  Injectable 1 milliGRAM(s) IntraMuscular once PRN Glucose LESS THAN 70 milligrams/deciliter        CAPILLARY BLOOD GLUCOSE      POCT Blood Glucose.: 215 mg/dL (18 Jan 2019 12:50)  POCT Blood Glucose.: 178 mg/dL (18 Jan 2019 08:51)  POCT Blood Glucose.: 171 mg/dL (17 Jan 2019 23:31)  POCT Blood Glucose.: 178 mg/dL (17 Jan 2019 17:59)    I&O's Summary    17 Jan 2019 07:01  -  18 Jan 2019 07:00  --------------------------------------------------------  IN: 780 mL / OUT: 0 mL / NET: 780 mL      tele: sinus 60-70  T 98.4, P 77, /70, R 18, O2 97%   PHYSICAL EXAM:  GENERAL: NAD  EYES: Pink conjunctiva and sclera clear  NECK: Supple  CHEST/LUNG: Good air entry , normal respiratory effort and rate, no rales   HEART: S1 and S2 RR No murmurs, rubs, or gallops  ABDOMEN: Soft, Nontender, Nondistended; Bowel sounds present  EXTREMITIES: chronic venous stasis changes of LEs bilaterally. stage I pressure ulcers bilateral heels. improved LUE swelling  PSYCH: AAOx1-2  NEURO: difficulty word finding. hypophonic voice (like a whisper). impaired vision in L. visual field (not recognizing?/?neglect)- tongue midline, smile proportional PERRL. LUE strength 3/5 improved LUE swelling. RUE strength 4/5. LE 2/5 bilaterally.    LABS:                        12.0   4.1   )-----------( 93       ( 18 Jan 2019 06:58 )             37.3     01-18    139  |  101  |  23  ----------------------------<  204<H>  3.5   |  27  |  0.93    Ca    8.7      18 Jan 2019 06:57  Mg     1.9     01-18                RADIOLOGY & ADDITIONAL TESTS:    Imaging Personally Reviewed:    Consultant(s) Notes Reviewed:  neuro.     Care Discussed with Consultants/Other Providers: case management, caro NP

## 2019-01-18 NOTE — SWALLOW BEDSIDE ASSESSMENT ADULT - PHARYNGEAL PHASE
intermittently delayed pharyngeal swallow trigger intermittently delayed pharyngeal swallow trigger, episode of discoordination during cued sequential straw sipping with cough. Eliminated via small, single sips.

## 2019-01-18 NOTE — PROGRESS NOTE ADULT - ASSESSMENT
79yo male with hx of CAD with s/p CABG 4v in 1997, multiple stents, HTN, PUD, Gout, DM2, and Acute HFrEF (EF 30-35%), presented to [facility] from [facility] for NSTEMI management after stabilization from complications of V. Fib arrest s/p ROSC after 20mins of CPR and ICU stay complicated by sepsis secondary to Aspiration PNA. Pt s/p Cardiac cath at [facility] revealing multiple vessel disease without any intervention, recommending medical optimization and stabilization prior to PCI. 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 Neurology on case, pending MRI.

## 2019-01-18 NOTE — PROGRESS NOTE ADULT - PROBLEM SELECTOR PLAN 1
pt with increasing word finding abilities, visual impairments, L. sided weakness as documented in exam above.   -likely cause of acute encephalopathy with possible other contributing factors (ICU delirium etc). Low suspicion for infection  -cont q4h neurochecks  -neuro consult appreciated  -out of bed to chair, frequent reorientation  -appreciate neuro eval- MR head, MRA head and neck. performed. pending read.  -concerned for cortical blindness. will also have optho eval pending MR results  -EEG with diffuse cerebral dysfunction  -PT/OT  -speech/swallow  -eventual rehab when medically stable

## 2019-01-18 NOTE — CONSULT NOTE ADULT - SUBJECTIVE AND OBJECTIVE BOX
[facility] Ophthalmology Consult Note    HPI: 79 y/o male with PMHx of CAD with s/p CABG 4v in 1997, multiple stents, HTN, PUD, Gout, DM2 presented to [facility] on Dec 31st after unwitnessed fall. Neurology consulted for AMS. NIHSS 10(current eval) MRI head done at [facility] on 1/3 w/ mild to moderate ischemic in gliotic changes w/ ventricular dilatation, communicating hydrocephalus could not be ruled out. Given mental status changes were first noticed s/p extubation, AMS likely 2/2 to anoxic brain injury; patient would benefit from repeat imaging of brain to further characterize lesion. Per primary team, family states patient may have blurry vision, Ophthalmology consulted for concern for cortical blindness.  Patient non verbal at this time, can only follow simple commands. Patient does not respond when asked if his vision is blurry.       PMH: CAD with s/p CABG 4v in 1997, multiple stents, HTN, PUD, Gout, DM2   Meds: Home Medications:  allopurinol 300 mg oral tablet: 1 tab(s) orally once a day, home and hospital (11 Jan 2019 16:01)  amiodarone 200 mg oral tablet: 1 tab(s) orally once a day, hospital (11 Jan 2019 16:01)  Aspir 81 oral delayed release tablet: 1 tab(s) orally once a day, home and hospital (11 Jan 2019 16:01)  Avodart 0.5 mg oral capsule: 1 cap(s) orally once a day home (11 Jan 2019 16:01)  cholecalciferol 2000 intl units oral tablet: 1 tab(s) orally once a day, hospital (11 Jan 2019 16:01)  glimepiride 2 mg oral tablet: 1 tab(s) orally 2 times a day, Home (11 Jan 2019 16:01)  Lasix 20 mg oral tablet: 1 tab(s) orally once a day home (11 Jan 2019 16:01)  losartan 50 mg oral tablet: 1 tab(s) orally once a day home (11 Jan 2019 16:01)  metFORMIN 500 mg oral tablet: 2 tab(s) orally 2 times a day home (11 Jan 2019 16:01)  metoprolol succinate 25 mg oral capsule, extended release: 1 cap(s) orally once a day, hospital (11 Jan 2019 16:01)  pantoprazole 40 mg oral delayed release tablet: 1 tab(s) orally once a day, home and hospital (11 Jan 2019 16:01)  Plavix 75 mg oral tablet: 1 tab(s) orally once a day, home and hospital (11 Jan 2019 16:01)  simvastatin 80 mg oral tablet: 1 tab(s) orally once a day (at bedtime), hospital (11 Jan 2019 16:01)  Toprol-XL 50 mg oral tablet, extended release: 1 tab(s) orally once a day home (11 Jan 2019 16:01)    POcHx (including surgeries/lasers/trauma):  Unknown  Drops: None  FamHx: None  Social Hx: None  Allergies: NKDA    ROS:  General (neg), Vision (per HPI), Head and Neck (neg), Pulm (neg), CV (neg), GI (neg),  (neg), Musculoskeletal (neg), Skin/Integ (neg), Neuro (neg), Endocrine (neg), Heme (neg), All/Immuno (neg)    Mood and Affect Appropriate ( x ),  Oriented to Time, Place, and Person x 3 ( No, x0. Intermittently follows simple commands )    Ophthalmology Exam    Visual acuity (sc): Fixes and follows OU. Limited exam 2/2 mental status  Pupils: PERRL OU, no APD  Ttono: 16 OU  Extraocular movements (EOMs): Full OU  Confrontational Visual Field (CVF):  HONORIO 2/2 mental status  Color Plates: HONORIO 2/2 mental status    Pen Light Exam (PLE)  External:  Flat OU  Lids/Lashes/Lacrimal Ducts: Flat OU    Sclera/Conjunctiva:  W+Q OU  Cornea: Cl OU  Anterior Chamber: D+F OD D+F, ACIOL OS  Iris:  Flat OU  Lens:  PCIOL  OD     Fundus Exam: dilated with 1% tropicamide and 2.5% phenylephrine  Approval obtained from primary team for dilation  Patient aware that pupils can remained dilated for at least 4-6 hours  Exam performed with 20D lens    Vitreous: wnl OU  Disc, cup/disc: sharp and pink, 0.4 OU  Macula:  wnl OU  Vessels:  wnl OU  Periphery: 2x Superior Nevi, 2 inferior dot blot hemorrhages OD. wnl OS     A/P: 79 y/o male with PMHx of CAD with s/p CABG 4v in 1997, multiple stents, HTN, PUD, Gout, DM2 presented to [facility] on Dec 31st after unwitnessed fall. S/P extubation patient with AMS likely 2/2 anoxic brain injury. Ophthalmology consulted for blurry vision. Unable to assess VA 2/2 mental status of patient, patient able to fixate and follow with eyes, however intermittent cooperation with exam. Pupils equal round and reactive, bilaterally. Dilated exam findings without any acute pathology to cause decreased vision.   -No acute Ophthalmologic intervention  Management per primary and Neuro teams  -Please reconsult Ophthalmology if mental status improves and patient still complaining of decreased vision    SDW Dr. Frank (attending)     Follow-Up:  Patient should follow up his/her ophthalmologist or in the [facility] Ophthalmology Practice within 1 week of discharge.  [address]  [place] [address]  [phone]

## 2019-01-18 NOTE — SWALLOW BEDSIDE ASSESSMENT ADULT - SLP PERTINENT HISTORY OF CURRENT PROBLEM
78M with CAD with s/p CABG 4v in 1997, multiple stents, HTN, PUD, Gout, DM2 presented to [facility] on Dec 31st after unwitnessed fall 2/2 VT arrest, returned to rhythm after 20 minutes of CPR, intubated and moved to ICU. Self-extubated 1/9. Pt's [facility] course was c/b aspiration pna, pulmonary edema. He was found to have EF of 30-35% and was transferred to [facility] for cath. In CCU weaned off BiPAP to NC 1/11. Cath demonstrated 90 and 95% occlusion of l circ and rpda, respectively however unable to intervene on lesions. Pt is now pending EP eval for AICD and requiring IV lasix for diuresis as well as BiPAP overnight. Pt's daughter states his mental status changed since he fell. Pt reported falling on a Friday and was found on a Sunday. He has been confused since.

## 2019-01-18 NOTE — PROGRESS NOTE ADULT - ASSESSMENT
79 y/o male with PMHx of CAD with s/p CABG 4v in 1997, multiple stents, HTN, PUD, Gout, DM2 presented to [facility] on Dec 31st after unwitnessed fall. Neurology consulted for AMS. No evidence for acute infarct or acute   hemorrhage on MRI head, moderate chronic white matter changes are present. EEG w/ mild non specific diffuse or multifocal cerebral dysfunction.     Recs:

## 2019-01-18 NOTE — PROGRESS NOTE ADULT - PROBLEM SELECTOR PLAN 4
-Saturating well on RA  -TTE EF 30-35% from outside hospital   -TTE now with EF 65% consistent with HFpEF. improved   -lasix 40mg daily PO  -Daily weights and Strict I/Os  -per EP no AICD at this time, first medical management. awaiting possible PCI   -Continue to monitor respiratory status

## 2019-01-18 NOTE — PROGRESS NOTE ADULT - SUBJECTIVE AND OBJECTIVE BOX
Neurology Follow up note    Name  AUDIE SEGURA    Subjective: Interval hx- no acute overnight events    Review of Systems:  As above    MEDICATIONS  (STANDING):  allopurinol 300 milliGRAM(s) Oral daily  amiodarone    Tablet 200 milliGRAM(s) Oral daily  aspirin enteric coated 81 milliGRAM(s) Oral daily  atorvastatin 40 milliGRAM(s) Oral at bedtime  chlorhexidine 4% Liquid 1 Application(s) Topical <User Schedule>  cholecalciferol 2000 Unit(s) Oral daily  clopidogrel Tablet 75 milliGRAM(s) Oral daily  dextrose 5%. 1000 milliLiter(s) (50 mL/Hr) IV Continuous <Continuous>  dextrose 50% Injectable 12.5 Gram(s) IV Push once  dextrose 50% Injectable 25 Gram(s) IV Push once  dextrose 50% Injectable 25 Gram(s) IV Push once  furosemide    Tablet 40 milliGRAM(s) Oral daily  heparin  Injectable 5000 Unit(s) SubCutaneous every 8 hours  insulin lispro (HumaLOG) corrective regimen sliding scale   SubCutaneous three times a day before meals  insulin lispro (HumaLOG) corrective regimen sliding scale   SubCutaneous at bedtime  insulin lispro Injectable (HumaLOG) 2 Unit(s) SubCutaneous three times a day before meals  losartan 50 milliGRAM(s) Oral daily  metoprolol succinate ER 50 milliGRAM(s) Oral daily  nystatin    Suspension [id number] Unit(s) Oral four times a day  pantoprazole    Tablet 40 milliGRAM(s) Oral before breakfast  tamsulosin 0.4 milliGRAM(s) Oral at bedtime    MEDICATIONS  (PRN):  acetaminophen   Tablet .. 650 milliGRAM(s) Oral every 6 hours PRN Mild Pain (1 - 3), Moderate Pain (4 - 6)  dextrose 40% Gel 15 Gram(s) Oral once PRN Blood Glucose LESS THAN 70 milliGRAM(s)/deciliter  glucagon  Injectable 1 milliGRAM(s) IntraMuscular once PRN Glucose LESS THAN 70 milligrams/deciliter      Allergies    No Known Allergies    Intolerances        Objective:   Vital Signs Last 24 Hrs  T(C): 36.9 (18 Jan 2019 05:13), Max: 36.9 (18 Jan 2019 05:13)  T(F): 98.4 (18 Jan 2019 05:13), Max: 98.4 (18 Jan 2019 05:13)  HR: 77 (18 Jan 2019 05:13) (77 - 82)  BP: 143/70 (18 Jan 2019 05:13) (143/70 - 153/78)  BP(mean): --  RR: 18 (18 Jan 2019 05:13) (18 - 18)  SpO2: 97% (18 Jan 2019 05:13) (97% - 98%)    Mental Status: Patient is alert, awake, oriented X1. Follows some commands, answers questions partially. Voice hypophonic but no apparent dysarthria/aphasia, exhibiting some word finding difficulties.     Cranial Nerves: PERRL, EOMI, Left field cut(exam impaired by patient not responding to questions. V1-V3 intact, no gross facial asymmetry, tongue/uvula midline    Motor Exam: No drift  Right upper extremity: 4/5, able to lift off bed >10 secs, very coarse tremor  Left upper extremity: 4-/5, able to lift off bed >10 secs, very coarse tremor  Right lower extremity: 2/5   Left lower extremity: 2/5    Normal bulk/tone    Sensory: Intact to light touch bilaterally. No extinction    Coordination: not able/willing to participate    Gait: not able/willing to participate    Reflexes: Bilateral 2+ Biceps, Brachial, Patellar, Ankle  Plantar: Down bilateral    GENERAL: No acute distress  HEENT:  Normocephalic, atraumatic  EXTREMITIES: No edema, clubbing, cyanosis  MUSCULOSKELETAL: Normal range of motion  SKIN: No rashes    Other:    01-18    139  |  101  |  23  ----------------------------<  204<H>  3.5   |  27  |  0.93    Ca    8.7      18 Jan 2019 06:57  Mg     1.9     01-18 01-18    139  |  101  |  23  ----------------------------<  204<H>  3.5   |  27  |  0.93    Ca    8.7      18 Jan 2019 06:57  Mg     1.9     01-18          Radiology    EKG:  tele:  TTE:  EEG:             \

## 2019-01-18 NOTE — PROGRESS NOTE ADULT - PROBLEM SELECTOR PLAN 9
-sacral and bilateral heel pressure wounds  -Follow up with Wound care recommendations  s/p podiatry eval for nail clipping

## 2019-01-18 NOTE — SWALLOW BEDSIDE ASSESSMENT ADULT - COMMENTS
1/15 Seen by Neuro, pt extubated over a week ago; as per family at bedside, pt has had persistent confusion, hypophonic speech after; previous baseline prior to event was AOx3, MRS 0. 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 Will await MRI brain especially to ro focal pathology in view of left sided lower extremity weakness. Probable hypoxic brain injury.    1/16 Per Med re delirium; pt w/ acute encephalopathy likely multifactorial; concern for hypoxic brain injury + hospital acquired delirium. Afebrile, low suspicion for infection. Increasing word finding difficulty today and worsening L. sided weakness. Concern for hypoxic brain injury following cardiac arrest/20 mins cpr. 1/17 rEEG: Mildly abnormal EEG study. 1. Mild nonspecific diffuse or multifocal cerebral dysfunction. 2. No epileptiform pattern or seizure seen.

## 2019-01-18 NOTE — OCCUPATIONAL THERAPY INITIAL EVALUATION ADULT - VISUAL ACUITY
difficult to assess 2* cognition, pt states he can not see therapist, states his vision is blurry, unable to make out colors or number of fingers

## 2019-01-18 NOTE — PROGRESS NOTE ADULT - PROBLEM SELECTOR PLAN 3
-s/p  V. 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   -Optimize medical management and plan for PCI prior to dc. discussed with cards fellow. When stable from neurological standpoint will call back cards (likely next week)  -Continue ASA/Plavix/Metoprolol/Statin  -TTE with EF 65%, severe MR.   - Cardiology recs appreciated

## 2019-01-18 NOTE — OCCUPATIONAL THERAPY INITIAL EVALUATION ADULT - ADDITIONAL COMMENTS
MRI Head: (-)  LUE Doppler: (-)  Xray Chest:  Right defibrillator pad is present. Moderate bilateral effusions and pulmonary edema. No pneumothorax. Sternotomy. The cardiac silhouette is enlarged.

## 2019-01-18 NOTE — CONSULT NOTE ADULT - ATTENDING COMMENTS
Patient with anoxic brain injury. Patient follows commands however nonverbal, and unable to elicit visual acuity. Recommend outpatient follow up with [facility] ophthalmology within 1 week of discharge.

## 2019-01-18 NOTE — PROGRESS NOTE ADULT - PROBLEM SELECTOR PLAN 7
-Chronic  -Continue SSI +Fingerstick  -added 2 units humalog premeal  -not requiring basal insulin at this time

## 2019-01-18 NOTE — OCCUPATIONAL THERAPY INITIAL EVALUATION ADULT - LIVES WITH, PROFILE
alone/Pt confused, as per care coordination notes pt lives alone in apartment, I in ADLs and ambulation prior to admission however as per daughter pt with increasing confusion prior to admission

## 2019-01-18 NOTE — PROGRESS NOTE ADULT - PROBLEM SELECTOR PLAN 2
LUE swelling improved. likely dependent edema. LUE sono negative for dvt  keep arm elevated  tylenol prn  avoid IV lines and blood draws this arm  improved

## 2019-01-18 NOTE — PROGRESS NOTE ADULT - PROBLEM SELECTOR PLAN 6
-Stable Hb at 11 with MCV> 100  - B12/Folate- ok    NOTED THROMBOCYTOPENIA- unclear cause. if continues to drop will need to rule out HIT

## 2019-01-19 LAB
ANION GAP SERPL CALC-SCNC: 14 MMOL/L — SIGNIFICANT CHANGE UP (ref 5–17)
BUN SERPL-MCNC: 22 MG/DL — SIGNIFICANT CHANGE UP (ref 7–23)
CALCIUM SERPL-MCNC: 9 MG/DL — SIGNIFICANT CHANGE UP (ref 8.4–10.5)
CHLORIDE SERPL-SCNC: 102 MMOL/L — SIGNIFICANT CHANGE UP (ref 96–108)
CO2 SERPL-SCNC: 28 MMOL/L — SIGNIFICANT CHANGE UP (ref 22–31)
CREAT SERPL-MCNC: 0.89 MG/DL — SIGNIFICANT CHANGE UP (ref 0.5–1.3)
GLUCOSE BLDC GLUCOMTR-MCNC: 134 MG/DL — HIGH (ref 70–99)
GLUCOSE BLDC GLUCOMTR-MCNC: 159 MG/DL — HIGH (ref 70–99)
GLUCOSE BLDC GLUCOMTR-MCNC: 178 MG/DL — HIGH (ref 70–99)
GLUCOSE BLDC GLUCOMTR-MCNC: 216 MG/DL — HIGH (ref 70–99)
GLUCOSE SERPL-MCNC: 207 MG/DL — HIGH (ref 70–99)
HCT VFR BLD CALC: 36.4 % — LOW (ref 39–50)
HGB BLD-MCNC: 12.4 G/DL — LOW (ref 13–17)
MCHC RBC-ENTMCNC: 33.8 PG — SIGNIFICANT CHANGE UP (ref 27–34)
MCHC RBC-ENTMCNC: 33.9 GM/DL — SIGNIFICANT CHANGE UP (ref 32–36)
MCV RBC AUTO: 99.7 FL — SIGNIFICANT CHANGE UP (ref 80–100)
PLATELET # BLD AUTO: 88 K/UL — LOW (ref 150–400)
POTASSIUM SERPL-MCNC: 3.5 MMOL/L — SIGNIFICANT CHANGE UP (ref 3.5–5.3)
POTASSIUM SERPL-SCNC: 3.5 MMOL/L — SIGNIFICANT CHANGE UP (ref 3.5–5.3)
RBC # BLD: 3.65 M/UL — LOW (ref 4.2–5.8)
RBC # FLD: 13.8 % — SIGNIFICANT CHANGE UP (ref 10.3–14.5)
SODIUM SERPL-SCNC: 144 MMOL/L — SIGNIFICANT CHANGE UP (ref 135–145)
WBC # BLD: 4.9 K/UL — SIGNIFICANT CHANGE UP (ref 3.8–10.5)
WBC # FLD AUTO: 4.9 K/UL — SIGNIFICANT CHANGE UP (ref 3.8–10.5)

## 2019-01-19 PROCEDURE — 99233 SBSQ HOSP IP/OBS HIGH 50: CPT

## 2019-01-19 RX ORDER — DOXAZOSIN MESYLATE 4 MG
2 TABLET ORAL AT BEDTIME
Qty: 0 | Refills: 0 | Status: DISCONTINUED | OUTPATIENT
Start: 2019-01-19 | End: 2019-01-29

## 2019-01-19 RX ADMIN — Medication 4: at 08:58

## 2019-01-19 RX ADMIN — Medication 2: at 13:06

## 2019-01-19 RX ADMIN — Medication 500000 UNIT(S): at 05:14

## 2019-01-19 RX ADMIN — Medication 500000 UNIT(S): at 00:24

## 2019-01-19 RX ADMIN — Medication 300 MILLIGRAM(S): at 12:03

## 2019-01-19 RX ADMIN — LOSARTAN POTASSIUM 50 MILLIGRAM(S): 100 TABLET, FILM COATED ORAL at 05:14

## 2019-01-19 RX ADMIN — Medication 2000 UNIT(S): at 12:03

## 2019-01-19 RX ADMIN — Medication 81 MILLIGRAM(S): at 12:03

## 2019-01-19 RX ADMIN — Medication 50 MILLIGRAM(S): at 05:14

## 2019-01-19 RX ADMIN — CLOPIDOGREL BISULFATE 75 MILLIGRAM(S): 75 TABLET, FILM COATED ORAL at 12:03

## 2019-01-19 RX ADMIN — HEPARIN SODIUM 5000 UNIT(S): 5000 INJECTION INTRAVENOUS; SUBCUTANEOUS at 12:03

## 2019-01-19 RX ADMIN — Medication 2 UNIT(S): at 08:58

## 2019-01-19 RX ADMIN — HEPARIN SODIUM 5000 UNIT(S): 5000 INJECTION INTRAVENOUS; SUBCUTANEOUS at 21:18

## 2019-01-19 RX ADMIN — Medication 2 UNIT(S): at 17:58

## 2019-01-19 RX ADMIN — Medication 1 APPLICATION(S): at 05:14

## 2019-01-19 RX ADMIN — Medication 500000 UNIT(S): at 17:58

## 2019-01-19 RX ADMIN — Medication 500000 UNIT(S): at 12:03

## 2019-01-19 RX ADMIN — Medication 2 UNIT(S): at 13:07

## 2019-01-19 RX ADMIN — Medication 1 APPLICATION(S): at 17:59

## 2019-01-19 RX ADMIN — HEPARIN SODIUM 5000 UNIT(S): 5000 INJECTION INTRAVENOUS; SUBCUTANEOUS at 05:14

## 2019-01-19 RX ADMIN — PANTOPRAZOLE SODIUM 40 MILLIGRAM(S): 20 TABLET, DELAYED RELEASE ORAL at 05:14

## 2019-01-19 RX ADMIN — Medication 40 MILLIGRAM(S): at 05:14

## 2019-01-19 RX ADMIN — Medication 500000 UNIT(S): at 23:15

## 2019-01-19 RX ADMIN — Medication 2 MILLIGRAM(S): at 23:14

## 2019-01-19 RX ADMIN — ATORVASTATIN CALCIUM 40 MILLIGRAM(S): 80 TABLET, FILM COATED ORAL at 21:18

## 2019-01-19 RX ADMIN — AMIODARONE HYDROCHLORIDE 200 MILLIGRAM(S): 400 TABLET ORAL at 05:14

## 2019-01-19 NOTE — PROGRESS NOTE ADULT - PROBLEM SELECTOR PLAN 3
-s/p  V. 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   -Optimize medical management and plan for PCI prior to dc.   -Continue ASA/Plavix/Metoprolol/Statin  -TTE with EF 65%, severe MR.

## 2019-01-19 NOTE — PROGRESS NOTE ADULT - ASSESSMENT
77yo male with hx of CAD with s/p CABG 4v in 1997, multiple stents, HTN, PUD, Gout, DM2, and Acute HFrEF (EF 30-35%), presented to [facility] from [facility] for NSTEMI management after stabilization from complications of V. Fib arrest s/p ROSC after 20mins of CPR and ICU stay complicated by sepsis secondary to Aspiration PNA. Pt s/p Cardiac cath at [facility] revealing multiple vessel disease without any intervention, recommending medical optimization and stabilization prior to PCI. 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

## 2019-01-19 NOTE — PROGRESS NOTE ADULT - PROBLEM SELECTOR PLAN 4
-Saturating well on RA  -TTE from outside hospital with EF 30-35%, repeat here improved with EF 65% consistent with HFpEF  -lasix 40mg daily PO  -Daily weights and Strict I/Os  -per EP no AICD at this time, first medical management. awaiting possible PCI   -Continue to monitor respiratory status

## 2019-01-19 NOTE — PROGRESS NOTE ADULT - PROBLEM SELECTOR PLAN 1
-pt with increasing word finding abilities, visual impairments, L sided weakness   -likely cause of acute encephalopathy with possible other contributing factors (ICU delirium etc). Low suspicion for infection  -cont q4h neurochecks  -neuro consult appreciated  -out of bed to chair, frequent reorientation  -appreciate neuro eval- MR head, MRA head and neck unremarkable  -appreciate ophthalmology eval- no acute intervention, outpatient f/u  -EEG with diffuse cerebral dysfunction  -PT/OT, speech/swallow f/u  -eventual rehab when medically stable

## 2019-01-19 NOTE — PROGRESS NOTE ADULT - SUBJECTIVE AND OBJECTIVE BOX
Devendra El MD  Pager [phone]  Spectra [phone]  Cell Phone [phone]    Patient is a 78y old  Male who presents with a chief complaint of cardiac arrest, CAD (18 Jan 2019 14:28)        SUBJECTIVE / OVERNIGHT EVENTS: Patient voices no complaints. No new events  TELEMETRY: SR       MEDICATIONS  (STANDING):  allopurinol 300 milliGRAM(s) Oral daily  amiodarone    Tablet 200 milliGRAM(s) Oral daily  ammonium lactate 12% Lotion 1 Application(s) Topical two times a day  aspirin enteric coated 81 milliGRAM(s) Oral daily  atorvastatin 40 milliGRAM(s) Oral at bedtime  chlorhexidine 4% Liquid 1 Application(s) Topical <User Schedule>  cholecalciferol 2000 Unit(s) Oral daily  clopidogrel Tablet 75 milliGRAM(s) Oral daily  dextrose 5%. 1000 milliLiter(s) (50 mL/Hr) IV Continuous <Continuous>  dextrose 50% Injectable 12.5 Gram(s) IV Push once  dextrose 50% Injectable 25 Gram(s) IV Push once  dextrose 50% Injectable 25 Gram(s) IV Push once  furosemide    Tablet 40 milliGRAM(s) Oral daily  heparin  Injectable 5000 Unit(s) SubCutaneous every 8 hours  insulin lispro (HumaLOG) corrective regimen sliding scale   SubCutaneous three times a day before meals  insulin lispro (HumaLOG) corrective regimen sliding scale   SubCutaneous at bedtime  insulin lispro Injectable (HumaLOG) 2 Unit(s) SubCutaneous three times a day before meals  losartan 50 milliGRAM(s) Oral daily  metoprolol succinate ER 50 milliGRAM(s) Oral daily  nystatin    Suspension 003020 Unit(s) Oral four times a day  pantoprazole    Tablet 40 milliGRAM(s) Oral before breakfast  tamsulosin 0.4 milliGRAM(s) Oral at bedtime    MEDICATIONS  (PRN):  acetaminophen   Tablet .. 650 milliGRAM(s) Oral every 6 hours PRN Mild Pain (1 - 3), Moderate Pain (4 - 6)  dextrose 40% Gel 15 Gram(s) Oral once PRN Blood Glucose LESS THAN 70 milliGRAM(s)/deciliter  glucagon  Injectable 1 milliGRAM(s) IntraMuscular once PRN Glucose LESS THAN 70 milligrams/deciliter      Vital Signs Last 24 Hrs  T(C): 36.3 (19 Jan 2019 12:03), Max: 37.2 (19 Jan 2019 04:55)  T(F): 97.4 (19 Jan 2019 12:03), Max: 99 (19 Jan 2019 04:55)  HR: 76 (19 Jan 2019 12:03) (76 - 87)  BP: 103/65 (19 Jan 2019 12:03) (103/65 - 125/74)  BP(mean): --  RR: 18 (19 Jan 2019 12:03) (17 - 18)  SpO2: 96% (19 Jan 2019 12:03) (94% - 96%)  CAPILLARY BLOOD GLUCOSE      POCT Blood Glucose.: 178 mg/dL (19 Jan 2019 12:50)  POCT Blood Glucose.: 216 mg/dL (19 Jan 2019 08:41)  POCT Blood Glucose.: 190 mg/dL (18 Jan 2019 22:00)  POCT Blood Glucose.: 210 mg/dL (18 Jan 2019 17:57)    I&O's Summary    18 Jan 2019 07:01  -  19 Jan 2019 07:00  --------------------------------------------------------  IN: 480 mL / OUT: 0 mL / NET: 480 mL    19 Jan 2019 07:01  -  19 Jan 2019 15:05  --------------------------------------------------------  IN: 480 mL / OUT: 400 mL / NET: 80 mL          PHYSICAL EXAM  GENERAL: NAD, well-developed  HEAD:  Atraumatic, Normocephalic  EYES: EOMI, PERRLA, conjunctiva and sclera clear  CHEST/LUNG: Clear to auscultation bilaterally; No wheeze  HEART: Regular rate and rhythm; 2/6 SAE; No rubs or gallops  ABDOMEN: Soft, Nontender, Nondistended; Bowel sounds present  EXTREMITIES:  2+ Peripheral Pulses, No clubbing, cyanosis, or edema      LABS:                        12.4   4.9   )-----------( 88       ( 19 Jan 2019 07:30 )             36.4     01-19    144  |  102  |  22  ----------------------------<  207<H>  3.5   |  28  |  0.89    Ca    9.0      19 Jan 2019 07:30  Mg     1.9     01-18                  RADIOLOGY & ADDITIONAL TESTS:    MRI/MRA  EXAM:  MR ANGIO BRAIN                        EXAM:  MR ANGIO NECK                        EXAM:  MR BRAIN                          PROCEDURE DATE:  01/17/2019      INTERPRETATION:  History: Left-sided weakness. V. fib arrest for 20   minutes with CPR..  Description: A noncontrast brain MRI, 3-D time-of-flight brain MRA, 3D   TOF neck MRA, and 2D time of flight neck MRA were performed.  Comparison: Head CT 01/12/2019..    Brain MRI:  Sagittal T1, axial T1, T2, FLAIR, SWI, GRE, and diffusion-weighted series   with ADC maps were performed.  There is no evidence for acute infarct, acute hemorrhage, mass effect, or   hydrocephalus.  A punctate focus of decreased SWI signal involves the right temporal lobe   without surrounding edema which may reflect an old microbleed,   calcification, or a tiny cavernous malformation.  Moderate patchy foci of increased T2 and FLAIR signal are present in the   periventricular and subcortical white matter which most likely represent   chronic microvascular ischemic changes given the patient's age.   Cerebral volume loss is present.    Brain MRA:  There is no evidence for focal stenosis, major vessel occlusion, or   aneurysm. Tiny aneurysms could be beyond the resolution of MRA technique.    Neck MRA:  No significant carotid or vertebral artery stenosis is noted in the neck   by NASCET criteria.    IMPRESSION:  1. On the brain MRI, there is no evidence for acute infarct or acute   hemorrhage. Moderate chronic white matter changes are present.  2. No evidence for significant intracranial or extracranial stenosis on   the MRA studies.    FREDERIC NASH M.D., ATTENDING RADIOLOGIST  This document has been electronically signed. Jan 18 2019  8:52AM

## 2019-01-20 LAB
ANION GAP SERPL CALC-SCNC: 11 MMOL/L — SIGNIFICANT CHANGE UP (ref 5–17)
BUN SERPL-MCNC: 25 MG/DL — HIGH (ref 7–23)
CALCIUM SERPL-MCNC: 8.7 MG/DL — SIGNIFICANT CHANGE UP (ref 8.4–10.5)
CHLORIDE SERPL-SCNC: 103 MMOL/L — SIGNIFICANT CHANGE UP (ref 96–108)
CO2 SERPL-SCNC: 27 MMOL/L — SIGNIFICANT CHANGE UP (ref 22–31)
CREAT SERPL-MCNC: 0.92 MG/DL — SIGNIFICANT CHANGE UP (ref 0.5–1.3)
GLUCOSE BLDC GLUCOMTR-MCNC: 137 MG/DL — HIGH (ref 70–99)
GLUCOSE BLDC GLUCOMTR-MCNC: 187 MG/DL — HIGH (ref 70–99)
GLUCOSE BLDC GLUCOMTR-MCNC: 201 MG/DL — HIGH (ref 70–99)
GLUCOSE BLDC GLUCOMTR-MCNC: 203 MG/DL — HIGH (ref 70–99)
GLUCOSE SERPL-MCNC: 186 MG/DL — HIGH (ref 70–99)
HCT VFR BLD CALC: 35.1 % — LOW (ref 39–50)
HGB BLD-MCNC: 11.7 G/DL — LOW (ref 13–17)
MAGNESIUM SERPL-MCNC: 2 MG/DL — SIGNIFICANT CHANGE UP (ref 1.6–2.6)
MCHC RBC-ENTMCNC: 33.3 GM/DL — SIGNIFICANT CHANGE UP (ref 32–36)
MCHC RBC-ENTMCNC: 33.3 PG — SIGNIFICANT CHANGE UP (ref 27–34)
MCV RBC AUTO: 100 FL — SIGNIFICANT CHANGE UP (ref 80–100)
PHOSPHATE SERPL-MCNC: 3.5 MG/DL — SIGNIFICANT CHANGE UP (ref 2.5–4.5)
PLATELET # BLD AUTO: 82 K/UL — LOW (ref 150–400)
POTASSIUM SERPL-MCNC: 3.5 MMOL/L — SIGNIFICANT CHANGE UP (ref 3.5–5.3)
POTASSIUM SERPL-SCNC: 3.5 MMOL/L — SIGNIFICANT CHANGE UP (ref 3.5–5.3)
RBC # BLD: 3.51 M/UL — LOW (ref 4.2–5.8)
RBC # FLD: 13.8 % — SIGNIFICANT CHANGE UP (ref 10.3–14.5)
SODIUM SERPL-SCNC: 141 MMOL/L — SIGNIFICANT CHANGE UP (ref 135–145)
WBC # BLD: 4.2 K/UL — SIGNIFICANT CHANGE UP (ref 3.8–10.5)
WBC # FLD AUTO: 4.2 K/UL — SIGNIFICANT CHANGE UP (ref 3.8–10.5)

## 2019-01-20 PROCEDURE — 99233 SBSQ HOSP IP/OBS HIGH 50: CPT

## 2019-01-20 RX ADMIN — Medication 40 MILLIGRAM(S): at 05:37

## 2019-01-20 RX ADMIN — Medication 2 MILLIGRAM(S): at 21:48

## 2019-01-20 RX ADMIN — Medication 1 APPLICATION(S): at 17:43

## 2019-01-20 RX ADMIN — CLOPIDOGREL BISULFATE 75 MILLIGRAM(S): 75 TABLET, FILM COATED ORAL at 12:08

## 2019-01-20 RX ADMIN — HEPARIN SODIUM 5000 UNIT(S): 5000 INJECTION INTRAVENOUS; SUBCUTANEOUS at 12:09

## 2019-01-20 RX ADMIN — ATORVASTATIN CALCIUM 40 MILLIGRAM(S): 80 TABLET, FILM COATED ORAL at 21:48

## 2019-01-20 RX ADMIN — Medication 500000 UNIT(S): at 12:09

## 2019-01-20 RX ADMIN — Medication 500000 UNIT(S): at 17:44

## 2019-01-20 RX ADMIN — AMIODARONE HYDROCHLORIDE 200 MILLIGRAM(S): 400 TABLET ORAL at 05:36

## 2019-01-20 RX ADMIN — Medication 81 MILLIGRAM(S): at 12:08

## 2019-01-20 RX ADMIN — Medication 2 UNIT(S): at 13:42

## 2019-01-20 RX ADMIN — HEPARIN SODIUM 5000 UNIT(S): 5000 INJECTION INTRAVENOUS; SUBCUTANEOUS at 21:48

## 2019-01-20 RX ADMIN — Medication 500000 UNIT(S): at 23:00

## 2019-01-20 RX ADMIN — Medication 2000 UNIT(S): at 12:08

## 2019-01-20 RX ADMIN — Medication 2 UNIT(S): at 09:12

## 2019-01-20 RX ADMIN — PANTOPRAZOLE SODIUM 40 MILLIGRAM(S): 20 TABLET, DELAYED RELEASE ORAL at 07:09

## 2019-01-20 RX ADMIN — Medication 500000 UNIT(S): at 05:36

## 2019-01-20 RX ADMIN — Medication 4: at 09:12

## 2019-01-20 RX ADMIN — HEPARIN SODIUM 5000 UNIT(S): 5000 INJECTION INTRAVENOUS; SUBCUTANEOUS at 05:36

## 2019-01-20 RX ADMIN — Medication 300 MILLIGRAM(S): at 12:08

## 2019-01-20 RX ADMIN — Medication 2 UNIT(S): at 18:12

## 2019-01-20 RX ADMIN — LOSARTAN POTASSIUM 50 MILLIGRAM(S): 100 TABLET, FILM COATED ORAL at 05:37

## 2019-01-20 RX ADMIN — Medication 1 APPLICATION(S): at 05:37

## 2019-01-20 RX ADMIN — Medication 2: at 18:12

## 2019-01-20 NOTE — PROGRESS NOTE ADULT - SUBJECTIVE AND OBJECTIVE BOX
Devendra El MD  Pager [phone]  Spectra [phone]  Cell Phone [phone]    Patient is a 78y old  Male who presents with a chief complaint of Transfer from [facility] for cardiac management (19 Jan 2019 15:04)        SUBJECTIVE / OVERNIGHT EVENTS: No new events  TELEMETRY: SR/ST 's, PACs      MEDICATIONS  (STANDING):  allopurinol 300 milliGRAM(s) Oral daily  amiodarone    Tablet 200 milliGRAM(s) Oral daily  ammonium lactate 12% Lotion 1 Application(s) Topical two times a day  aspirin enteric coated 81 milliGRAM(s) Oral daily  atorvastatin 40 milliGRAM(s) Oral at bedtime  chlorhexidine 4% Liquid 1 Application(s) Topical <User Schedule>  cholecalciferol 2000 Unit(s) Oral daily  clopidogrel Tablet 75 milliGRAM(s) Oral daily  dextrose 5%. 1000 milliLiter(s) (50 mL/Hr) IV Continuous <Continuous>  dextrose 50% Injectable 12.5 Gram(s) IV Push once  dextrose 50% Injectable 25 Gram(s) IV Push once  dextrose 50% Injectable 25 Gram(s) IV Push once  doxazosin 2 milliGRAM(s) Oral at bedtime  furosemide    Tablet 40 milliGRAM(s) Oral daily  heparin  Injectable 5000 Unit(s) SubCutaneous every 8 hours  insulin lispro (HumaLOG) corrective regimen sliding scale   SubCutaneous three times a day before meals  insulin lispro (HumaLOG) corrective regimen sliding scale   SubCutaneous at bedtime  insulin lispro Injectable (HumaLOG) 2 Unit(s) SubCutaneous three times a day before meals  losartan 50 milliGRAM(s) Oral daily  metoprolol succinate ER 50 milliGRAM(s) Oral daily  nystatin    Suspension 923806 Unit(s) Oral four times a day  pantoprazole    Tablet 40 milliGRAM(s) Oral before breakfast    MEDICATIONS  (PRN):  acetaminophen   Tablet .. 650 milliGRAM(s) Oral every 6 hours PRN Mild Pain (1 - 3), Moderate Pain (4 - 6)  dextrose 40% Gel 15 Gram(s) Oral once PRN Blood Glucose LESS THAN 70 milliGRAM(s)/deciliter  glucagon  Injectable 1 milliGRAM(s) IntraMuscular once PRN Glucose LESS THAN 70 milligrams/deciliter      Vital Signs Last 24 Hrs  T(C): 37.1 (20 Jan 2019 04:49), Max: 37.1 (20 Jan 2019 04:49)  T(F): 98.8 (20 Jan 2019 04:49), Max: 98.8 (20 Jan 2019 04:49)  HR: 89 (20 Jan 2019 04:49) (75 - 89)  BP: 102/65 (20 Jan 2019 04:49) (102/65 - 114/71)  BP(mean): --  RR: 18 (20 Jan 2019 04:49) (18 - 18)  SpO2: 95% (20 Jan 2019 04:49) (95% - 95%)  CAPILLARY BLOOD GLUCOSE      POCT Blood Glucose.: 201 mg/dL (20 Jan 2019 09:03)  POCT Blood Glucose.: 159 mg/dL (19 Jan 2019 21:25)  POCT Blood Glucose.: 134 mg/dL (19 Jan 2019 17:57)  POCT Blood Glucose.: 178 mg/dL (19 Jan 2019 12:50)    I&O's Summary    19 Jan 2019 07:01  -  20 Jan 2019 07:00  --------------------------------------------------------  IN: 750 mL / OUT: 400 mL / NET: 350 mL    20 Jan 2019 07:01  -  20 Jan 2019 12:15  --------------------------------------------------------  IN: 0 mL / OUT: 500 mL / NET: -500 mL          PHYSICAL EXAM  GENERAL: NAD, well-developed  HEAD:  Atraumatic, Normocephalic  EYES: EOMI, PERRLA, conjunctiva and sclera clear  CHEST/LUNG: Clear to auscultation bilaterally; No wheeze  HEART: Regular rate and rhythm; 2/6 SAE; No rubs or gallops  ABDOMEN: Soft, Nontender, Nondistended; Bowel sounds present  EXTREMITIES:  2+ Peripheral Pulses, No clubbing, cyanosis, or edema      LABS:                        11.7   4.2   )-----------( 82       ( 20 Jan 2019 06:43 )             35.1     01-20    141  |  103  |  25<H>  ----------------------------<  186<H>  3.5   |  27  |  0.92    Ca    8.7      20 Jan 2019 06:40  Phos  3.5     01-20  Mg     2.0     01-20                  RADIOLOGY & ADDITIONAL TESTS:    Imaging Personally Reviewed:  Consultant(s) Notes Reviewed:    Care Discussed with Consultants/Other Providers: Devendra El MD  Pager [phone]  Spectra [phone]  Cell Phone [phone]    Patient is a 78y old  Male who presents with a chief complaint of Transfer from [facility] for cardiac management (19 Jan 2019 15:04)        SUBJECTIVE / OVERNIGHT EVENTS: No new events  TELEMETRY: SR/ST 's, PACs      MEDICATIONS  (STANDING):  allopurinol 300 milliGRAM(s) Oral daily  amiodarone    Tablet 200 milliGRAM(s) Oral daily  ammonium lactate 12% Lotion 1 Application(s) Topical two times a day  aspirin enteric coated 81 milliGRAM(s) Oral daily  atorvastatin 40 milliGRAM(s) Oral at bedtime  chlorhexidine 4% Liquid 1 Application(s) Topical <User Schedule>  cholecalciferol 2000 Unit(s) Oral daily  clopidogrel Tablet 75 milliGRAM(s) Oral daily  dextrose 5%. 1000 milliLiter(s) (50 mL/Hr) IV Continuous <Continuous>  dextrose 50% Injectable 12.5 Gram(s) IV Push once  dextrose 50% Injectable 25 Gram(s) IV Push once  dextrose 50% Injectable 25 Gram(s) IV Push once  doxazosin 2 milliGRAM(s) Oral at bedtime  furosemide    Tablet 40 milliGRAM(s) Oral daily  heparin  Injectable 5000 Unit(s) SubCutaneous every 8 hours  insulin lispro (HumaLOG) corrective regimen sliding scale   SubCutaneous three times a day before meals  insulin lispro (HumaLOG) corrective regimen sliding scale   SubCutaneous at bedtime  insulin lispro Injectable (HumaLOG) 2 Unit(s) SubCutaneous three times a day before meals  losartan 50 milliGRAM(s) Oral daily  metoprolol succinate ER 50 milliGRAM(s) Oral daily  nystatin    Suspension 532830 Unit(s) Oral four times a day  pantoprazole    Tablet 40 milliGRAM(s) Oral before breakfast    MEDICATIONS  (PRN):  acetaminophen   Tablet .. 650 milliGRAM(s) Oral every 6 hours PRN Mild Pain (1 - 3), Moderate Pain (4 - 6)  dextrose 40% Gel 15 Gram(s) Oral once PRN Blood Glucose LESS THAN 70 milliGRAM(s)/deciliter  glucagon  Injectable 1 milliGRAM(s) IntraMuscular once PRN Glucose LESS THAN 70 milligrams/deciliter      Vital Signs Last 24 Hrs  T(C): 37.1 (20 Jan 2019 04:49), Max: 37.1 (20 Jan 2019 04:49)  T(F): 98.8 (20 Jan 2019 04:49), Max: 98.8 (20 Jan 2019 04:49)  HR: 89 (20 Jan 2019 04:49) (75 - 89)  BP: 102/65 (20 Jan 2019 04:49) (102/65 - 114/71)  BP(mean): --  RR: 18 (20 Jan 2019 04:49) (18 - 18)  SpO2: 95% (20 Jan 2019 04:49) (95% - 95%)  CAPILLARY BLOOD GLUCOSE      POCT Blood Glucose.: 201 mg/dL (20 Jan 2019 09:03)  POCT Blood Glucose.: 159 mg/dL (19 Jan 2019 21:25)  POCT Blood Glucose.: 134 mg/dL (19 Jan 2019 17:57)  POCT Blood Glucose.: 178 mg/dL (19 Jan 2019 12:50)    I&O's Summary    19 Jan 2019 07:01  -  20 Jan 2019 07:00  --------------------------------------------------------  IN: 750 mL / OUT: 400 mL / NET: 350 mL    20 Jan 2019 07:01  -  20 Jan 2019 12:15  --------------------------------------------------------  IN: 0 mL / OUT: 500 mL / NET: -500 mL          PHYSICAL EXAM  GENERAL: NAD, well-developed  HEAD:  Atraumatic, Normocephalic  EYES: EOMI, PERRLA, conjunctiva and sclera clear  CHEST/LUNG: Clear to auscultation bilaterally; No wheeze  HEART: Regular rate and rhythm; 2/6 SAE; No rubs or gallops  ABDOMEN: Soft, Nontender, Nondistended; Bowel sounds present  EXTREMITIES:  LE's: 2+ Peripheral Pulses, No clubbing, cyanosis, or edema LUE: 1+ pitting edema  	      LABS:                        11.7   4.2   )-----------( 82       ( 20 Jan 2019 06:43 )             35.1     01-20    141  |  103  |  25<H>  ----------------------------<  186<H>  3.5   |  27  |  0.92    Ca    8.7      20 Jan 2019 06:40  Phos  3.5     01-20  Mg     2.0     01-20                  RADIOLOGY & ADDITIONAL TESTS:    Imaging Personally Reviewed:  Consultant(s) Notes Reviewed:    Care Discussed with Consultants/Other Providers:

## 2019-01-20 NOTE — PROGRESS NOTE ADULT - ASSESSMENT
79yo male with hx of CAD with s/p CABG 4v in 1997, multiple stents, HTN, PUD, Gout, DM2, and Acute HFrEF (EF 30-35%), presented to [facility] from [facility] for NSTEMI management after stabilization from complications of V. Fib arrest s/p ROSC after 20mins of CPR and ICU stay complicated by sepsis secondary to Aspiration PNA. Pt s/p Cardiac cath at [facility] revealing multiple vessel disease without any intervention, recommending medical optimization and stabilization prior to PCI. 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     MRI/MRA  EXAM:  MR ANGIO BRAIN                        EXAM:  MR ANGIO NECK                        EXAM:  MR BRAIN                          PROCEDURE DATE:  01/17/2019      INTERPRETATION:  History: Left-sided weakness. V. fib arrest for 20   minutes with CPR..  Description: A noncontrast brain MRI, 3-D time-of-flight brain MRA, 3D   TOF neck MRA, and 2D time of flight neck MRA were performed.  Comparison: Head CT 01/12/2019..    Brain MRI:  Sagittal T1, axial T1, T2, FLAIR, SWI, GRE, and diffusion-weighted series   with ADC maps were performed.  There is no evidence for acute infarct, acute hemorrhage, mass effect, or   hydrocephalus.  A punctate focus of decreased SWI signal involves the right temporal lobe   without surrounding edema which may reflect an old microbleed,   calcification, or a tiny cavernous malformation.  Moderate patchy foci of increased T2 and FLAIR signal are present in the   periventricular and subcortical white matter which most likely represent   chronic microvascular ischemic changes given the patient's age.   Cerebral volume loss is present.    Brain MRA:  There is no evidence for focal stenosis, major vessel occlusion, or   aneurysm. Tiny aneurysms could be beyond the resolution of MRA technique.    Neck MRA:  No significant carotid or vertebral artery stenosis is noted in the neck   by NASCET criteria.    IMPRESSION:  1. On the brain MRI, there is no evidence for acute infarct or acute   hemorrhage. Moderate chronic white matter changes are present.  2. No evidence for significant intracranial or extracranial stenosis on   the MRA studies.    FREDERIC NASH M.D., ATTENDING RADIOLOGIST  This document has been electronically signed. Jan 18 2019  8:52AM

## 2019-01-20 NOTE — PROGRESS NOTE ADULT - PROBLEM SELECTOR PLAN 7
-Chronic  -Continue SSI +Fingerstick  -continue premeal Humalog 2 units 3x daily  -not requiring basal insulin at this time

## 2019-01-21 LAB
ANION GAP SERPL CALC-SCNC: 11 MMOL/L — SIGNIFICANT CHANGE UP (ref 5–17)
BUN SERPL-MCNC: 26 MG/DL — HIGH (ref 7–23)
CALCIUM SERPL-MCNC: 8.8 MG/DL — SIGNIFICANT CHANGE UP (ref 8.4–10.5)
CHLORIDE SERPL-SCNC: 102 MMOL/L — SIGNIFICANT CHANGE UP (ref 96–108)
CO2 SERPL-SCNC: 28 MMOL/L — SIGNIFICANT CHANGE UP (ref 22–31)
CREAT SERPL-MCNC: 0.95 MG/DL — SIGNIFICANT CHANGE UP (ref 0.5–1.3)
GLUCOSE BLDC GLUCOMTR-MCNC: 160 MG/DL — HIGH (ref 70–99)
GLUCOSE BLDC GLUCOMTR-MCNC: 170 MG/DL — HIGH (ref 70–99)
GLUCOSE BLDC GLUCOMTR-MCNC: 211 MG/DL — HIGH (ref 70–99)
GLUCOSE BLDC GLUCOMTR-MCNC: 217 MG/DL — HIGH (ref 70–99)
GLUCOSE SERPL-MCNC: 187 MG/DL — HIGH (ref 70–99)
HCT VFR BLD CALC: 34.3 % — LOW (ref 39–50)
HGB BLD-MCNC: 11.4 G/DL — LOW (ref 13–17)
MAGNESIUM SERPL-MCNC: 1.8 MG/DL — SIGNIFICANT CHANGE UP (ref 1.6–2.6)
MCHC RBC-ENTMCNC: 33.3 GM/DL — SIGNIFICANT CHANGE UP (ref 32–36)
MCHC RBC-ENTMCNC: 33.5 PG — SIGNIFICANT CHANGE UP (ref 27–34)
MCV RBC AUTO: 101 FL — HIGH (ref 80–100)
PHOSPHATE SERPL-MCNC: 3.2 MG/DL — SIGNIFICANT CHANGE UP (ref 2.5–4.5)
PLATELET # BLD AUTO: 73 K/UL — LOW (ref 150–400)
POTASSIUM SERPL-MCNC: 3.5 MMOL/L — SIGNIFICANT CHANGE UP (ref 3.5–5.3)
POTASSIUM SERPL-SCNC: 3.5 MMOL/L — SIGNIFICANT CHANGE UP (ref 3.5–5.3)
RBC # BLD: 3.41 M/UL — LOW (ref 4.2–5.8)
RBC # FLD: 13.4 % — SIGNIFICANT CHANGE UP (ref 10.3–14.5)
SODIUM SERPL-SCNC: 141 MMOL/L — SIGNIFICANT CHANGE UP (ref 135–145)
WBC # BLD: 4.2 K/UL — SIGNIFICANT CHANGE UP (ref 3.8–10.5)
WBC # FLD AUTO: 4.2 K/UL — SIGNIFICANT CHANGE UP (ref 3.8–10.5)

## 2019-01-21 PROCEDURE — 99233 SBSQ HOSP IP/OBS HIGH 50: CPT

## 2019-01-21 RX ORDER — POTASSIUM CHLORIDE 20 MEQ
20 PACKET (EA) ORAL ONCE
Qty: 0 | Refills: 0 | Status: COMPLETED | OUTPATIENT
Start: 2019-01-21 | End: 2019-01-21

## 2019-01-21 RX ADMIN — Medication 2000 UNIT(S): at 09:03

## 2019-01-21 RX ADMIN — HEPARIN SODIUM 5000 UNIT(S): 5000 INJECTION INTRAVENOUS; SUBCUTANEOUS at 21:42

## 2019-01-21 RX ADMIN — PANTOPRAZOLE SODIUM 40 MILLIGRAM(S): 20 TABLET, DELAYED RELEASE ORAL at 06:28

## 2019-01-21 RX ADMIN — Medication 20 MILLIEQUIVALENT(S): at 13:36

## 2019-01-21 RX ADMIN — Medication 500000 UNIT(S): at 06:28

## 2019-01-21 RX ADMIN — ATORVASTATIN CALCIUM 40 MILLIGRAM(S): 80 TABLET, FILM COATED ORAL at 21:41

## 2019-01-21 RX ADMIN — Medication 4: at 09:03

## 2019-01-21 RX ADMIN — CLOPIDOGREL BISULFATE 75 MILLIGRAM(S): 75 TABLET, FILM COATED ORAL at 09:02

## 2019-01-21 RX ADMIN — Medication 40 MILLIGRAM(S): at 06:27

## 2019-01-21 RX ADMIN — HEPARIN SODIUM 5000 UNIT(S): 5000 INJECTION INTRAVENOUS; SUBCUTANEOUS at 06:28

## 2019-01-21 RX ADMIN — Medication 2 MILLIGRAM(S): at 21:41

## 2019-01-21 RX ADMIN — Medication 1 APPLICATION(S): at 18:02

## 2019-01-21 RX ADMIN — Medication 2: at 18:01

## 2019-01-21 RX ADMIN — Medication 50 MILLIGRAM(S): at 06:27

## 2019-01-21 RX ADMIN — AMIODARONE HYDROCHLORIDE 200 MILLIGRAM(S): 400 TABLET ORAL at 06:27

## 2019-01-21 RX ADMIN — Medication 4: at 13:31

## 2019-01-21 RX ADMIN — Medication 2 UNIT(S): at 13:31

## 2019-01-21 RX ADMIN — Medication 500000 UNIT(S): at 23:55

## 2019-01-21 RX ADMIN — Medication 500000 UNIT(S): at 18:01

## 2019-01-21 RX ADMIN — Medication 2 UNIT(S): at 09:03

## 2019-01-21 RX ADMIN — Medication 81 MILLIGRAM(S): at 09:03

## 2019-01-21 RX ADMIN — Medication 500000 UNIT(S): at 13:31

## 2019-01-21 RX ADMIN — Medication 1 APPLICATION(S): at 06:29

## 2019-01-21 RX ADMIN — CHLORHEXIDINE GLUCONATE 1 APPLICATION(S): 213 SOLUTION TOPICAL at 09:02

## 2019-01-21 RX ADMIN — Medication 300 MILLIGRAM(S): at 09:02

## 2019-01-21 RX ADMIN — LOSARTAN POTASSIUM 50 MILLIGRAM(S): 100 TABLET, FILM COATED ORAL at 06:27

## 2019-01-21 RX ADMIN — HEPARIN SODIUM 5000 UNIT(S): 5000 INJECTION INTRAVENOUS; SUBCUTANEOUS at 13:31

## 2019-01-21 RX ADMIN — Medication 2 UNIT(S): at 18:01

## 2019-01-21 NOTE — PROGRESS NOTE ADULT - ASSESSMENT
79yo male with hx of CAD with s/p CABG 4v in 1997, multiple stents, HTN, PUD, Gout, DM2, and Acute HFrEF (EF 30-35%), presented to [facility] from [facility] for NSTEMI management after stabilization from complications of V. Fib arrest s/p ROSC after 20mins of CPR and ICU stay complicated by sepsis secondary to Aspiration PNA. Pt s/p Cardiac cath at [facility] revealing multiple vessel disease without any intervention, recommending medical optimization and stabilization prior to PCI. 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     MRI/MRA  EXAM:  MR ANGIO BRAIN                        EXAM:  MR ANGIO NECK                        EXAM:  MR BRAIN                          PROCEDURE DATE:  01/17/2019      INTERPRETATION:  History: Left-sided weakness. V. fib arrest for 20   minutes with CPR..  Description: A noncontrast brain MRI, 3-D time-of-flight brain MRA, 3D   TOF neck MRA, and 2D time of flight neck MRA were performed.  Comparison: Head CT 01/12/2019..    Brain MRI:  Sagittal T1, axial T1, T2, FLAIR, SWI, GRE, and diffusion-weighted series   with ADC maps were performed.  There is no evidence for acute infarct, acute hemorrhage, mass effect, or   hydrocephalus.  A punctate focus of decreased SWI signal involves the right temporal lobe   without surrounding edema which may reflect an old microbleed,   calcification, or a tiny cavernous malformation.  Moderate patchy foci of increased T2 and FLAIR signal are present in the   periventricular and subcortical white matter which most likely represent   chronic microvascular ischemic changes given the patient's age.   Cerebral volume loss is present.    Brain MRA:  There is no evidence for focal stenosis, major vessel occlusion, or   aneurysm. Tiny aneurysms could be beyond the resolution of MRA technique.    Neck MRA:  No significant carotid or vertebral artery stenosis is noted in the neck   by NASCET criteria.    IMPRESSION:  1. On the brain MRI, there is no evidence for acute infarct or acute   hemorrhage. Moderate chronic white matter changes are present.  2. No evidence for significant intracranial or extracranial stenosis on   the MRA studies.    FREDERIC NASH M.D., ATTENDING RADIOLOGIST  This document has been electronically signed. Jan 18 2019  8:52AM

## 2019-01-21 NOTE — PROGRESS NOTE ADULT - PROBLEM SELECTOR PLAN 3
-s/p  V. 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   -Optimize medical management  -PCI prior to D/C.   -Continue ASA/Plavix/Metoprolol/Statin  -TTE with EF 65%, severe MR.

## 2019-01-21 NOTE — PROGRESS NOTE ADULT - SUBJECTIVE AND OBJECTIVE BOX
Devendra El MD  Pager [phone]  Spectra [phone]  Cell Phone [phone]    Patient is a 78y old  Male who presents with a chief complaint of Transfer from [facility] for cardiac management (19 Jan 2019 15:04)        SUBJECTIVE / OVERNIGHT EVENTS: No new events  TELEMETRY: SR 80-90's      MEDICATIONS  (STANDING):  allopurinol 300 milliGRAM(s) Oral daily  amiodarone    Tablet 200 milliGRAM(s) Oral daily  ammonium lactate 12% Lotion 1 Application(s) Topical two times a day  aspirin enteric coated 81 milliGRAM(s) Oral daily  atorvastatin 40 milliGRAM(s) Oral at bedtime  chlorhexidine 4% Liquid 1 Application(s) Topical <User Schedule>  cholecalciferol 2000 Unit(s) Oral daily  clopidogrel Tablet 75 milliGRAM(s) Oral daily  dextrose 5%. 1000 milliLiter(s) (50 mL/Hr) IV Continuous <Continuous>  dextrose 50% Injectable 12.5 Gram(s) IV Push once  dextrose 50% Injectable 25 Gram(s) IV Push once  dextrose 50% Injectable 25 Gram(s) IV Push once  doxazosin 2 milliGRAM(s) Oral at bedtime  furosemide    Tablet 40 milliGRAM(s) Oral daily  heparin  Injectable 5000 Unit(s) SubCutaneous every 8 hours  insulin lispro (HumaLOG) corrective regimen sliding scale   SubCutaneous three times a day before meals  insulin lispro (HumaLOG) corrective regimen sliding scale   SubCutaneous at bedtime  insulin lispro Injectable (HumaLOG) 2 Unit(s) SubCutaneous three times a day before meals  losartan 50 milliGRAM(s) Oral daily  metoprolol succinate ER 50 milliGRAM(s) Oral daily  nystatin    Suspension 798600 Unit(s) Oral four times a day  pantoprazole    Tablet 40 milliGRAM(s) Oral before breakfast  potassium chloride   Solution 20 milliEquivalent(s) Oral once    MEDICATIONS  (PRN):  acetaminophen   Tablet .. 650 milliGRAM(s) Oral every 6 hours PRN Mild Pain (1 - 3), Moderate Pain (4 - 6)  dextrose 40% Gel 15 Gram(s) Oral once PRN Blood Glucose LESS THAN 70 milliGRAM(s)/deciliter  glucagon  Injectable 1 milliGRAM(s) IntraMuscular once PRN Glucose LESS THAN 70 milligrams/deciliter      Vital Signs Last 24 Hrs  T(C): 37 (21 Jan 2019 04:52), Max: 37 (21 Jan 2019 04:52)  T(F): 98.6 (21 Jan 2019 04:52), Max: 98.6 (21 Jan 2019 04:52)  HR: 89 (21 Jan 2019 04:52) (89 - 96)  BP: 112/63 (21 Jan 2019 04:52) (107/67 - 112/63)  BP(mean): --  RR: 18 (21 Jan 2019 04:52) (18 - 18)  SpO2: 97% (21 Jan 2019 04:52) (93% - 97%)  CAPILLARY BLOOD GLUCOSE      POCT Blood Glucose.: 211 mg/dL (21 Jan 2019 13:22)  POCT Blood Glucose.: 217 mg/dL (21 Jan 2019 08:50)  POCT Blood Glucose.: 203 mg/dL (20 Jan 2019 21:47)  POCT Blood Glucose.: 187 mg/dL (20 Jan 2019 17:56)    I&O's Summary    20 Jan 2019 07:01  -  21 Jan 2019 07:00  --------------------------------------------------------  IN: 1020 mL / OUT: 740 mL / NET: 280 mL          PHYSICAL EXAM  GENERAL: NAD, well-developed  HEAD:  Atraumatic, Normocephalic  EYES: EOMI, PERRLA, conjunctiva and sclera clear  CHEST/LUNG: Clear to auscultation bilaterally; No wheeze  HEART: Regular rate and rhythm; 2/6 SAE; No rubs or gallops  ABDOMEN: Soft, Nontender, Nondistended; Bowel sounds present  EXTREMITIES:  LE's: 2+ Peripheral Pulses, No clubbing, cyanosis, or edema LUE: 1+ pitting edema    LABS:                        11.4   4.2   )-----------( 73       ( 21 Jan 2019 06:33 )             34.3     01-21    141  |  102  |  26<H>  ----------------------------<  187<H>  3.5   |  28  |  0.95    Ca    8.8      21 Jan 2019 06:33  Phos  3.2     01-21  Mg     1.8     01-21                  RADIOLOGY & ADDITIONAL TESTS:    Imaging Personally Reviewed:  Consultant(s) Notes Reviewed:    Care Discussed with Consultants/Other Providers:

## 2019-01-22 LAB
ANION GAP SERPL CALC-SCNC: 12 MMOL/L — SIGNIFICANT CHANGE UP (ref 5–17)
BUN SERPL-MCNC: 26 MG/DL — HIGH (ref 7–23)
CALCIUM SERPL-MCNC: 8.8 MG/DL — SIGNIFICANT CHANGE UP (ref 8.4–10.5)
CHLORIDE SERPL-SCNC: 100 MMOL/L — SIGNIFICANT CHANGE UP (ref 96–108)
CO2 SERPL-SCNC: 27 MMOL/L — SIGNIFICANT CHANGE UP (ref 22–31)
CREAT SERPL-MCNC: 0.9 MG/DL — SIGNIFICANT CHANGE UP (ref 0.5–1.3)
GLUCOSE BLDC GLUCOMTR-MCNC: 158 MG/DL — HIGH (ref 70–99)
GLUCOSE BLDC GLUCOMTR-MCNC: 179 MG/DL — HIGH (ref 70–99)
GLUCOSE BLDC GLUCOMTR-MCNC: 185 MG/DL — HIGH (ref 70–99)
GLUCOSE BLDC GLUCOMTR-MCNC: 218 MG/DL — HIGH (ref 70–99)
GLUCOSE SERPL-MCNC: 177 MG/DL — HIGH (ref 70–99)
HCT VFR BLD CALC: 34.3 % — LOW (ref 39–50)
HGB BLD-MCNC: 11.5 G/DL — LOW (ref 13–17)
MAGNESIUM SERPL-MCNC: 1.8 MG/DL — SIGNIFICANT CHANGE UP (ref 1.6–2.6)
MCHC RBC-ENTMCNC: 33.3 PG — SIGNIFICANT CHANGE UP (ref 27–34)
MCHC RBC-ENTMCNC: 33.4 GM/DL — SIGNIFICANT CHANGE UP (ref 32–36)
MCV RBC AUTO: 99.5 FL — SIGNIFICANT CHANGE UP (ref 80–100)
PHOSPHATE SERPL-MCNC: 3.2 MG/DL — SIGNIFICANT CHANGE UP (ref 2.5–4.5)
PLATELET # BLD AUTO: 74 K/UL — LOW (ref 150–400)
POTASSIUM SERPL-MCNC: 3.5 MMOL/L — SIGNIFICANT CHANGE UP (ref 3.5–5.3)
POTASSIUM SERPL-SCNC: 3.5 MMOL/L — SIGNIFICANT CHANGE UP (ref 3.5–5.3)
RBC # BLD: 3.45 M/UL — LOW (ref 4.2–5.8)
RBC # FLD: 13.6 % — SIGNIFICANT CHANGE UP (ref 10.3–14.5)
SODIUM SERPL-SCNC: 139 MMOL/L — SIGNIFICANT CHANGE UP (ref 135–145)
WBC # BLD: 3.8 K/UL — SIGNIFICANT CHANGE UP (ref 3.8–10.5)
WBC # FLD AUTO: 3.8 K/UL — SIGNIFICANT CHANGE UP (ref 3.8–10.5)

## 2019-01-22 PROCEDURE — 99233 SBSQ HOSP IP/OBS HIGH 50: CPT

## 2019-01-22 RX ADMIN — Medication 50 MILLIGRAM(S): at 05:34

## 2019-01-22 RX ADMIN — LOSARTAN POTASSIUM 50 MILLIGRAM(S): 100 TABLET, FILM COATED ORAL at 05:33

## 2019-01-22 RX ADMIN — ATORVASTATIN CALCIUM 40 MILLIGRAM(S): 80 TABLET, FILM COATED ORAL at 21:31

## 2019-01-22 RX ADMIN — Medication 1 APPLICATION(S): at 05:36

## 2019-01-22 RX ADMIN — Medication 2 UNIT(S): at 18:04

## 2019-01-22 RX ADMIN — HEPARIN SODIUM 5000 UNIT(S): 5000 INJECTION INTRAVENOUS; SUBCUTANEOUS at 13:49

## 2019-01-22 RX ADMIN — Medication 500000 UNIT(S): at 23:32

## 2019-01-22 RX ADMIN — Medication 2000 UNIT(S): at 09:20

## 2019-01-22 RX ADMIN — CHLORHEXIDINE GLUCONATE 1 APPLICATION(S): 213 SOLUTION TOPICAL at 09:16

## 2019-01-22 RX ADMIN — Medication 500000 UNIT(S): at 18:04

## 2019-01-22 RX ADMIN — Medication 300 MILLIGRAM(S): at 09:20

## 2019-01-22 RX ADMIN — CLOPIDOGREL BISULFATE 75 MILLIGRAM(S): 75 TABLET, FILM COATED ORAL at 09:20

## 2019-01-22 RX ADMIN — Medication 81 MILLIGRAM(S): at 09:20

## 2019-01-22 RX ADMIN — Medication 2 MILLIGRAM(S): at 21:31

## 2019-01-22 RX ADMIN — AMIODARONE HYDROCHLORIDE 200 MILLIGRAM(S): 400 TABLET ORAL at 05:34

## 2019-01-22 RX ADMIN — Medication 2: at 09:21

## 2019-01-22 RX ADMIN — Medication 2 UNIT(S): at 09:21

## 2019-01-22 RX ADMIN — Medication 1 APPLICATION(S): at 20:53

## 2019-01-22 RX ADMIN — HEPARIN SODIUM 5000 UNIT(S): 5000 INJECTION INTRAVENOUS; SUBCUTANEOUS at 21:31

## 2019-01-22 RX ADMIN — Medication 4: at 13:48

## 2019-01-22 RX ADMIN — PANTOPRAZOLE SODIUM 40 MILLIGRAM(S): 20 TABLET, DELAYED RELEASE ORAL at 09:20

## 2019-01-22 RX ADMIN — Medication 2 UNIT(S): at 13:49

## 2019-01-22 RX ADMIN — Medication 500000 UNIT(S): at 13:49

## 2019-01-22 RX ADMIN — Medication 2: at 18:04

## 2019-01-22 RX ADMIN — Medication 40 MILLIGRAM(S): at 05:35

## 2019-01-22 RX ADMIN — HEPARIN SODIUM 5000 UNIT(S): 5000 INJECTION INTRAVENOUS; SUBCUTANEOUS at 05:33

## 2019-01-22 RX ADMIN — Medication 500000 UNIT(S): at 05:35

## 2019-01-22 NOTE — SWALLOW BEDSIDE ASSESSMENT ADULT - CONSISTENCIES ADMINISTERED
puree thin/puree thick thin liquid/nectar thick/>8 oz thin liquid kathy from dinner tray/[facility] soft

## 2019-01-22 NOTE — SWALLOW BEDSIDE ASSESSMENT ADULT - ASR SWALLOW LABIAL MOBILITY
on L/impaired retraction/impaired pursing
impaired retraction/impaired pursing/on L
72 y/o M PMH A fib, S/P cardioversion "many years ago," on coumadin, Hodgkins lymphoma, S/P splenectomy, chemotherapy and radiation 1975, now with c/o chronic low back pain, denies pain or numbness of the lower extremities.  Presents today for L3-4 lumbar laminectomy with pedicle screws.     PROCEDURE:  1/5 s/p L3-L4 laminectomy fusion. Pt tolerated surgery without complications and treated for post op pain. . Surgical drain removed on post op day 2.   Pt was followed by Cardiology during hospital course. No cardiac events during hospitalization. Pt is medically and neurologically stable for discharge to home.

## 2019-01-22 NOTE — PROGRESS NOTE ADULT - SUBJECTIVE AND OBJECTIVE BOX
Patient is a 78y old  Male who presents with a chief complaint of VF/CAD (22 Jan 2019 18:28)      SUBJECTIVE / OVERNIGHT EVENTS:    no overnight events. remains hypophonic, following some commands. no change in condition. no acute complaints.     MEDICATIONS  (STANDING):  allopurinol 300 milliGRAM(s) Oral daily  amiodarone    Tablet 200 milliGRAM(s) Oral daily  ammonium lactate 12% Lotion 1 Application(s) Topical two times a day  aspirin enteric coated 81 milliGRAM(s) Oral daily  atorvastatin 40 milliGRAM(s) Oral at bedtime  chlorhexidine 4% Liquid 1 Application(s) Topical <User Schedule>  cholecalciferol 2000 Unit(s) Oral daily  clopidogrel Tablet 75 milliGRAM(s) Oral daily  dextrose 5%. 1000 milliLiter(s) (50 mL/Hr) IV Continuous <Continuous>  dextrose 50% Injectable 12.5 Gram(s) IV Push once  dextrose 50% Injectable 25 Gram(s) IV Push once  dextrose 50% Injectable 25 Gram(s) IV Push once  doxazosin 2 milliGRAM(s) Oral at bedtime  furosemide    Tablet 40 milliGRAM(s) Oral daily  heparin  Injectable 5000 Unit(s) SubCutaneous every 8 hours  insulin lispro (HumaLOG) corrective regimen sliding scale   SubCutaneous three times a day before meals  insulin lispro (HumaLOG) corrective regimen sliding scale   SubCutaneous at bedtime  insulin lispro Injectable (HumaLOG) 2 Unit(s) SubCutaneous three times a day before meals  losartan 50 milliGRAM(s) Oral daily  metoprolol succinate ER 50 milliGRAM(s) Oral daily  nystatin    Suspension 254528 Unit(s) Oral four times a day  pantoprazole    Tablet 40 milliGRAM(s) Oral before breakfast    MEDICATIONS  (PRN):  acetaminophen   Tablet .. 650 milliGRAM(s) Oral every 6 hours PRN Mild Pain (1 - 3), Moderate Pain (4 - 6)  dextrose 40% Gel 15 Gram(s) Oral once PRN Blood Glucose LESS THAN 70 milliGRAM(s)/deciliter  glucagon  Injectable 1 milliGRAM(s) IntraMuscular once PRN Glucose LESS THAN 70 milligrams/deciliter        CAPILLARY BLOOD GLUCOSE      POCT Blood Glucose.: 179 mg/dL (22 Jan 2019 17:50)  POCT Blood Glucose.: 218 mg/dL (22 Jan 2019 12:53)  POCT Blood Glucose.: 185 mg/dL (22 Jan 2019 08:55)  POCT Blood Glucose.: 160 mg/dL (21 Jan 2019 22:12)    I&O's Summary    21 Jan 2019 07:01  -  22 Jan 2019 07:00  --------------------------------------------------------  IN: 1146 mL / OUT: 0 mL / NET: 1146 mL    22 Jan 2019 07:01  -  22 Jan 2019 19:19  --------------------------------------------------------  IN: 600 mL / OUT: 0 mL / NET: 600 mL      tele sinus 72-80  T 98.2, P 82, /67, R 18, O2 95% RA  PHYSICAL EXAM:  GENERAL: NAD, well-developed  HEAD:  Atraumatic, Normocephalic  EYES: EOMI, PERRLA, conjunctiva and sclera clear  CHEST/LUNG: Clear to auscultation bilaterally; No wheeze  HEART: Regular rate and rhythm; 2/6 SAE; No rubs or gallops  ABDOMEN: Soft, Nontender, Nondistended; Bowel sounds present  EXTREMITIES:  LE's: 2+ Peripheral Pulses, No clubbing, cyanosis, or edema LUE: 1+ pitting edema      LABS:                        11.5   3.8   )-----------( 74       ( 22 Jan 2019 05:45 )             34.3     01-22    139  |  100  |  26<H>  ----------------------------<  177<H>  3.5   |  27  |  0.90    Ca    8.8      22 Jan 2019 05:45  Phos  3.2     01-22  Mg     1.8     01-22                RADIOLOGY & ADDITIONAL TESTS:    Imaging Personally Reviewed:    Consultant(s) Notes Reviewed:  cards    Care Discussed with Consultants/Other Providers: medicine NP

## 2019-01-22 NOTE — SWALLOW BEDSIDE ASSESSMENT ADULT - SWALLOW EVAL: RECOMMENDED FEEDING/EATING TECHNIQUES
position upright (90 degrees)/small sips/bites/crush medication (when feasible)/alternate food with liquid/maintain upright posture during/after eating for 30 mins
small sips/bites/maintain upright posture during/after eating for 30 mins/crush medication (when feasible)/position upright (90 degrees)/allow adequate time for pt to chew/swallow, cut all PO into SMALL, BITE SIZED PIECES, provide small SINGLE straw sips only

## 2019-01-22 NOTE — PROGRESS NOTE ADULT - SUBJECTIVE AND OBJECTIVE BOX
SUBJ:  Hemodynamically stable. 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 Unable to speak with me but appears to give subtle head movements in response to questioning.     MEDICATIONS  (STANDING):  allopurinol 300 milliGRAM(s) Oral daily  amiodarone    Tablet 200 milliGRAM(s) Oral daily  ammonium lactate 12% Lotion 1 Application(s) Topical two times a day  aspirin enteric coated 81 milliGRAM(s) Oral daily  atorvastatin 40 milliGRAM(s) Oral at bedtime  chlorhexidine 4% Liquid 1 Application(s) Topical <User Schedule>  cholecalciferol 2000 Unit(s) Oral daily  clopidogrel Tablet 75 milliGRAM(s) Oral daily  dextrose 5%. 1000 milliLiter(s) (50 mL/Hr) IV Continuous <Continuous>  dextrose 50% Injectable 12.5 Gram(s) IV Push once  dextrose 50% Injectable 25 Gram(s) IV Push once  dextrose 50% Injectable 25 Gram(s) IV Push once  doxazosin 2 milliGRAM(s) Oral at bedtime  furosemide    Tablet 40 milliGRAM(s) Oral daily  heparin  Injectable 5000 Unit(s) SubCutaneous every 8 hours  insulin lispro (HumaLOG) corrective regimen sliding scale   SubCutaneous three times a day before meals  insulin lispro (HumaLOG) corrective regimen sliding scale   SubCutaneous at bedtime  insulin lispro Injectable (HumaLOG) 2 Unit(s) SubCutaneous three times a day before meals  losartan 50 milliGRAM(s) Oral daily  metoprolol succinate ER 50 milliGRAM(s) Oral daily  nystatin    Suspension 781970 Unit(s) Oral four times a day  pantoprazole    Tablet 40 milliGRAM(s) Oral before breakfast    MEDICATIONS  (PRN):  acetaminophen   Tablet .. 650 milliGRAM(s) Oral every 6 hours PRN Mild Pain (1 - 3), Moderate Pain (4 - 6)  dextrose 40% Gel 15 Gram(s) Oral once PRN Blood Glucose LESS THAN 70 milliGRAM(s)/deciliter  glucagon  Injectable 1 milliGRAM(s) IntraMuscular once PRN Glucose LESS THAN 70 milligrams/deciliter    Vital Signs Last 24 Hrs  T(C): 36.8 (22 Jan 2019 04:16), Max: 36.8 (21 Jan 2019 21:23)  T(F): 98.2 (22 Jan 2019 04:16), Max: 98.3 (21 Jan 2019 21:23)  HR: 88 (22 Jan 2019 12:35) (80 - 88)  BP: 112/68 (22 Jan 2019 12:35) (106/63 - 115/67)  RR: 18 (22 Jan 2019 12:35) (18 - 18)  SpO2: 95% (22 Jan 2019 12:35) (95% - 98%)    REVIEW OF SYSTEMS:  Unable to obtain due to unable to communicate.     PHYSICAL EXAM:  · CONSTITUTIONAL: Well-developed, well nourished      · EYES: EOMI; PERRL; no drainage or redness  · NECK: No bruits; no thyromegaly or nodules  ·RESPIRATORY:   airway patent; breath sounds equal; good air movement; respirations non-labored; clear to auscultation bilaterally; no chest wall tenderness; no intercostal retractions; no rales,rhonchi or wheeze  · CARDIOVASCULAR: regular rate and rhythm  no rub  no murmur  normal PMI  . GASTROINTESTINAL:  no distention; no masses palpable; bowel sounds normal  · EXTREMITIES: No cyanosis, clubbing or edema  · VASCULAR:  Equal and normal pulses (radial, femoral)  ·NEUROLOGICAL:  Alert and oriented x 3; sensation intact; deep reflexes intact; cranial nerves intact; no spontaneous movement; superficial reflexes intact; normal strength  · SKIN: No lesions; no rash  . LYMPH NODES: No lymphadedenopathy  · MUSCULOSKELETAL:  No calf tenderness  no joint swelling	    TTE 1/16/2019  Conclusions:  1. Moderate-severe mitral regurgitation.  2. Calcified trileaflet aortic valve with decreased  opening. Peak transaortic valve gradient equals 27 mm Hg,  mean transaortic valve gradient equals 17 mm Hg, estimated  aortic valve area equals 1.3 sqcm (by continuity equation),  aortic valve velocity time integral equals 58 cm,  consistent with moderate aortic stenosis. Mild aortic  regurgitation.  3. Moderate concentric left ventricular hypertrophy.  4. Endocardial visualization enhanced with intravenous  injection of Ultrasonic Enhancing Agent (Definity). Normal  left ventricular systolic function.  5. Mild diastolic dysfunction (Stage I).  6. Normal right ventricular size and function.  7. Estimated pulmonary artery systolic pressure equals 42  mm Hg, assuming right atrial pressure equals 8 mm Hg,  consistent with mild pulmonary pressures.  *** No previous Echo exam.    Cardiac Cath 1/11/2019  PROCEDURE:  --  Left heart catheterization.  --  Left coronary angiography.  --  Right coronary angiography.  --  Bypass graft angiography.  --  Sonosite - Diagnostic.  --  Hemostasis with Angioseal.    VENTRICLES: No left ventriculogram was performed.    CORONARY VESSELS: The coronary circulation is right dominant.  LM:   --  LM: Angiography showed minor luminal irregularities with no flow  limiting lesions.  LAD:   --  Proximal LAD: There was a 100 % stenosis.  CX:   --  Proximal circumflex: There was a tubular 95 % stenosis at the  site of a prior stent, in-stent.  RCA:   --  Proximal RCA: There was a 100 % stenosis.  --  RPDA: There was a tubular 90 % stenosis distal to the graft  anastomosis. The lesion was irregularly contoured, ulcerated, and complex.  --  RPLS: There was a 90 % stenosis.  GRAFTS:   --  Graft to the mid LAD: The graft was a LIMA. It was normal.  --  Graft to the 1st diagonal: The graft was a saphenous vein graft from  the aorta. Graft angiography showed minor luminal irregularities.  --  Graft to the RPDA: The graft was a saphenous vein graft from the aorta.  It was normal.    COMPLICATIONS: There were no complications.    DIAGNOSTIC IMPRESSIONS: Severe, new lesions in the Lcx (restenosis and  RPDA). High LVEDP    DIAGNOSTIC RECOMMENDATIONS: Admit to CCU to wean off Bipap and titrate med  Rx. EP eval for SCD. Plan for PCI early next week pending respiratory  status    LABS:   CBC Full  -  ( 22 Jan 2019 05:45 )  WBC Count : 3.8 K/uL  Hemoglobin : 11.5 g/dL  Hematocrit : 34.3 %  Platelet Count - Automated : 74 K/uL  Mean Cell Volume : 99.5 fl  Mean Cell Hemoglobin : 33.3 pg  Mean Cell Hemoglobin Concentration : 33.4 gm/dL    01-22    139  |  100  |  26<H>  ----------------------------<  177<H>  3.5   |  27  |  0.90    Ca    8.8      22 Jan 2019 05:45  Phos  3.2     01-22  Mg     1.8     01-22    IMPRESSION AND PLAN:  78M CAD s/p 4vCABG 1997 and multiple PCIs, HTN, PUD, DM2, HFrEF transfer from [facility] after unwitnessed fall with VFib arrest requiring 20 min CPR, extubated on 1/9 to BiPAP with hospital course complicated by aspiration PNA. Transferred to [facility] for LHC, found to have severe flow limiting lesions in Cx and RPDA, planned for revascularization after medication optimization and compensation of CHF.    1) arrest/NSTEMI  Preserved EF, moderate AS, moderate LVH.   Cath with LCx and RPDA    Cotinue asa/plavix, statin  - continue amiodarone and Toprol XL  - No ICD indicated at this time per EP - Waiting time after MI is 40 days if no intervention or 3 months after PCI  - Will plan for C when okay with neurology    #HFrEF  - continue lasix 40 mg PO daily  - cont BB  - monitor I/Os, daily weights  - Please obtain TTE        Eddie Morgan MD, MPH, GABY  Cardiovascular Specialist Attending  [facility]  C: [phone]  E: [email or web address]  (Cardiology nocturnist available every night 7 pm - 7 am; available week days for follow-up; general cardiology consult coverage weekend days) SUBJ:  Hemodynamically stable. 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 Unable to speak with me but appears to give subtle head movements in response to questioning.     MEDICATIONS  (STANDING):  allopurinol 300 milliGRAM(s) Oral daily  amiodarone    Tablet 200 milliGRAM(s) Oral daily  ammonium lactate 12% Lotion 1 Application(s) Topical two times a day  aspirin enteric coated 81 milliGRAM(s) Oral daily  atorvastatin 40 milliGRAM(s) Oral at bedtime  chlorhexidine 4% Liquid 1 Application(s) Topical <User Schedule>  cholecalciferol 2000 Unit(s) Oral daily  clopidogrel Tablet 75 milliGRAM(s) Oral daily  dextrose 5%. 1000 milliLiter(s) (50 mL/Hr) IV Continuous <Continuous>  dextrose 50% Injectable 12.5 Gram(s) IV Push once  dextrose 50% Injectable 25 Gram(s) IV Push once  dextrose 50% Injectable 25 Gram(s) IV Push once  doxazosin 2 milliGRAM(s) Oral at bedtime  furosemide    Tablet 40 milliGRAM(s) Oral daily  heparin  Injectable 5000 Unit(s) SubCutaneous every 8 hours  insulin lispro (HumaLOG) corrective regimen sliding scale   SubCutaneous three times a day before meals  insulin lispro (HumaLOG) corrective regimen sliding scale   SubCutaneous at bedtime  insulin lispro Injectable (HumaLOG) 2 Unit(s) SubCutaneous three times a day before meals  losartan 50 milliGRAM(s) Oral daily  metoprolol succinate ER 50 milliGRAM(s) Oral daily  nystatin    Suspension 826619 Unit(s) Oral four times a day  pantoprazole    Tablet 40 milliGRAM(s) Oral before breakfast    MEDICATIONS  (PRN):  acetaminophen   Tablet .. 650 milliGRAM(s) Oral every 6 hours PRN Mild Pain (1 - 3), Moderate Pain (4 - 6)  dextrose 40% Gel 15 Gram(s) Oral once PRN Blood Glucose LESS THAN 70 milliGRAM(s)/deciliter  glucagon  Injectable 1 milliGRAM(s) IntraMuscular once PRN Glucose LESS THAN 70 milligrams/deciliter    Vital Signs Last 24 Hrs  T(C): 36.8 (22 Jan 2019 04:16), Max: 36.8 (21 Jan 2019 21:23)  T(F): 98.2 (22 Jan 2019 04:16), Max: 98.3 (21 Jan 2019 21:23)  HR: 88 (22 Jan 2019 12:35) (80 - 88)  BP: 112/68 (22 Jan 2019 12:35) (106/63 - 115/67)  RR: 18 (22 Jan 2019 12:35) (18 - 18)  SpO2: 95% (22 Jan 2019 12:35) (95% - 98%)    REVIEW OF SYSTEMS:  Unable to obtain due to unable to communicate.     PHYSICAL EXAM:  · CONSTITUTIONAL: Well-developed, well nourished      · EYES: EOMI; PERRL; no drainage or redness  · NECK: No bruits; no thyromegaly or nodules  ·RESPIRATORY:   airway patent; breath sounds equal; good air movement; respirations non-labored; clear to auscultation bilaterally; no chest wall tenderness; no intercostal retractions; no rales,rhonchi or wheeze  · CARDIOVASCULAR: regular rate and rhythm  no rub  no murmur  normal PMI  . GASTROINTESTINAL:  no distention; no masses palpable; bowel sounds normal  · EXTREMITIES: No cyanosis, clubbing or edema  · VASCULAR:  Equal and normal pulses (radial, femoral)  ·NEUROLOGICAL:  Alert and oriented x 3; sensation intact; deep reflexes intact; cranial nerves intact; no spontaneous movement; superficial reflexes intact; normal strength  · SKIN: No lesions; no rash  . LYMPH NODES: No lymphadedenopathy  · MUSCULOSKELETAL:  No calf tenderness  no joint swelling	    TTE 1/16/2019  Conclusions:  1. Moderate-severe mitral regurgitation.  2. Calcified trileaflet aortic valve with decreased  opening. Peak transaortic valve gradient equals 27 mm Hg,  mean transaortic valve gradient equals 17 mm Hg, estimated  aortic valve area equals 1.3 sqcm (by continuity equation),  aortic valve velocity time integral equals 58 cm,  consistent with moderate aortic stenosis. Mild aortic  regurgitation.  3. Moderate concentric left ventricular hypertrophy.  4. Endocardial visualization enhanced with intravenous  injection of Ultrasonic Enhancing Agent (Definity). Normal  left ventricular systolic function.  5. Mild diastolic dysfunction (Stage I).  6. Normal right ventricular size and function.  7. Estimated pulmonary artery systolic pressure equals 42  mm Hg, assuming right atrial pressure equals 8 mm Hg,  consistent with mild pulmonary pressures.  *** No previous Echo exam.    Cardiac Cath 1/11/2019  PROCEDURE:  --  Left heart catheterization.  --  Left coronary angiography.  --  Right coronary angiography.  --  Bypass graft angiography.  --  Sonosite - Diagnostic.  --  Hemostasis with Angioseal.    VENTRICLES: No left ventriculogram was performed.    CORONARY VESSELS: The coronary circulation is right dominant.  LM:   --  LM: Angiography showed minor luminal irregularities with no flow  limiting lesions.  LAD:   --  Proximal LAD: There was a 100 % stenosis.  CX:   --  Proximal circumflex: There was a tubular 95 % stenosis at the  site of a prior stent, in-stent.  RCA:   --  Proximal RCA: There was a 100 % stenosis.  --  RPDA: There was a tubular 90 % stenosis distal to the graft  anastomosis. The lesion was irregularly contoured, ulcerated, and complex.  --  RPLS: There was a 90 % stenosis.  GRAFTS:   --  Graft to the mid LAD: The graft was a LIMA. It was normal.  --  Graft to the 1st diagonal: The graft was a saphenous vein graft from  the aorta. Graft angiography showed minor luminal irregularities.  --  Graft to the RPDA: The graft was a saphenous vein graft from the aorta.  It was normal.    COMPLICATIONS: There were no complications.    DIAGNOSTIC IMPRESSIONS: Severe, new lesions in the Lcx (restenosis and  RPDA). High LVEDP    DIAGNOSTIC RECOMMENDATIONS: Admit to CCU to wean off Bipap and titrate med  Rx. EP eval for SCD. Plan for PCI early next week pending respiratory  status    LABS:   CBC Full  -  ( 22 Jan 2019 05:45 )  WBC Count : 3.8 K/uL  Hemoglobin : 11.5 g/dL  Hematocrit : 34.3 %  Platelet Count - Automated : 74 K/uL  Mean Cell Volume : 99.5 fl  Mean Cell Hemoglobin : 33.3 pg  Mean Cell Hemoglobin Concentration : 33.4 gm/dL    01-22    139  |  100  |  26<H>  ----------------------------<  177<H>  3.5   |  27  |  0.90    Ca    8.8      22 Jan 2019 05:45  Phos  3.2     01-22  Mg     1.8     01-22    IMPRESSION AND PLAN:  78M CAD s/p 4vCABG 1997 and multiple PCIs, HTN, PUD, DM2, HFrEF transfer from [facility] after unwitnessed fall with VFib arrest requiring 20 min CPR, extubated on 1/9 to BiPAP with hospital course complicated by aspiration PNA. Transferred to [facility] for C, found to have severe flow limiting lesions in Cx and RPDA, planned for revascularization after medication optimization and compensation of CHF.    1) arrest/NSTEMI  Preserved EF, moderate AS, moderate LVH.   Cath with LCx and RPDA    ·	Continue medical management with aspirin 81 mg daily, clopidogrel 75 mg daily, atorvastatin 40 mg nightly, metoprolol succinate 50 mg daily, losartan 50 mg daily, furosemide 40 mg daily.   ·	No ICD indicated at this time per EP - Waiting time after MI is 40 days if no intervention or 3 months after PCI  ·	Will discuss with interventional cardiology Dr. Roberts regarding timing of PCI. I am somewhat apprehensive in aggressive intervention in the setting of his severe neurologic deficits and long term prognosis. Comment from neurology would be appreciated.     Eddie Morgan MD, MPH, GABY  Cardiovascular Specialist Attending  [facility]  C: [phone]  E: [email or web address]  (Cardiology nocturnist available every night 7 pm - 7 am; available week days for follow-up; general cardiology consult coverage weekend days) SUBJ:  Hemodynamically stable. 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 Unable to speak with me but appears to give subtle head movements in response to questioning.     MEDICATIONS  (STANDING):  allopurinol 300 milliGRAM(s) Oral daily  amiodarone    Tablet 200 milliGRAM(s) Oral daily  ammonium lactate 12% Lotion 1 Application(s) Topical two times a day  aspirin enteric coated 81 milliGRAM(s) Oral daily  atorvastatin 40 milliGRAM(s) Oral at bedtime  chlorhexidine 4% Liquid 1 Application(s) Topical <User Schedule>  cholecalciferol 2000 Unit(s) Oral daily  clopidogrel Tablet 75 milliGRAM(s) Oral daily  dextrose 5%. 1000 milliLiter(s) (50 mL/Hr) IV Continuous <Continuous>  dextrose 50% Injectable 12.5 Gram(s) IV Push once  dextrose 50% Injectable 25 Gram(s) IV Push once  dextrose 50% Injectable 25 Gram(s) IV Push once  doxazosin 2 milliGRAM(s) Oral at bedtime  furosemide    Tablet 40 milliGRAM(s) Oral daily  heparin  Injectable 5000 Unit(s) SubCutaneous every 8 hours  insulin lispro (HumaLOG) corrective regimen sliding scale   SubCutaneous three times a day before meals  insulin lispro (HumaLOG) corrective regimen sliding scale   SubCutaneous at bedtime  insulin lispro Injectable (HumaLOG) 2 Unit(s) SubCutaneous three times a day before meals  losartan 50 milliGRAM(s) Oral daily  metoprolol succinate ER 50 milliGRAM(s) Oral daily  nystatin    Suspension [id number] Unit(s) Oral four times a day  pantoprazole    Tablet 40 milliGRAM(s) Oral before breakfast    MEDICATIONS  (PRN):  acetaminophen   Tablet .. 650 milliGRAM(s) Oral every 6 hours PRN Mild Pain (1 - 3), Moderate Pain (4 - 6)  dextrose 40% Gel 15 Gram(s) Oral once PRN Blood Glucose LESS THAN 70 milliGRAM(s)/deciliter  glucagon  Injectable 1 milliGRAM(s) IntraMuscular once PRN Glucose LESS THAN 70 milligrams/deciliter    Vital Signs Last 24 Hrs  T(C): 36.8 (22 Jan 2019 04:16), Max: 36.8 (21 Jan 2019 21:23)  T(F): 98.2 (22 Jan 2019 04:16), Max: 98.3 (21 Jan 2019 21:23)  HR: 88 (22 Jan 2019 12:35) (80 - 88)  BP: 112/68 (22 Jan 2019 12:35) (106/63 - 115/67)  RR: 18 (22 Jan 2019 12:35) (18 - 18)  SpO2: 95% (22 Jan 2019 12:35) (95% - 98%)    REVIEW OF SYSTEMS:  Unable to obtain due to unable to communicate.     PHYSICAL EXAM:  · CONSTITUTIONAL: Well-developed, well nourished      · EYES: EOMI; PERRL; no drainage or redness  · NECK: No bruits; no thyromegaly or nodules  ·RESPIRATORY:   airway patent; breath sounds equal; good air movement; respirations non-labored; clear to auscultation bilaterally; no chest wall tenderness; no intercostal retractions; no rales,rhonchi or wheeze  · CARDIOVASCULAR: regular rate and rhythm  no rub  no murmur  normal PMI  . GASTROINTESTINAL:  no distention; no masses palpable; bowel sounds normal  · EXTREMITIES: No cyanosis, clubbing or edema  · VASCULAR:  Equal and normal pulses (radial, femoral)  ·NEUROLOGICAL:  Alert and oriented x 3; sensation intact; deep reflexes intact; cranial nerves intact; no spontaneous movement; superficial reflexes intact; normal strength  · SKIN: No lesions; no rash  . LYMPH NODES: No lymphadedenopathy  · MUSCULOSKELETAL:  No calf tenderness  no joint swelling	    TTE 1/16/2019  Conclusions:  1. Moderate-severe mitral regurgitation.  2. Calcified trileaflet aortic valve with decreased  opening. Peak transaortic valve gradient equals 27 mm Hg,  mean transaortic valve gradient equals 17 mm Hg, estimated  aortic valve area equals 1.3 sqcm (by continuity equation),  aortic valve velocity time integral equals 58 cm,  consistent with moderate aortic stenosis. Mild aortic  regurgitation.  3. Moderate concentric left ventricular hypertrophy.  4. Endocardial visualization enhanced with intravenous  injection of Ultrasonic Enhancing Agent (Definity). Normal  left ventricular systolic function.  5. Mild diastolic dysfunction (Stage I).  6. Normal right ventricular size and function.  7. Estimated pulmonary artery systolic pressure equals 42  mm Hg, assuming right atrial pressure equals 8 mm Hg,  consistent with mild pulmonary pressures.  *** No previous Echo exam.    Cardiac Cath 1/11/2019  PROCEDURE:  --  Left heart catheterization.  --  Left coronary angiography.  --  Right coronary angiography.  --  Bypass graft angiography.  --  Sonosite - Diagnostic.  --  Hemostasis with Angioseal.    VENTRICLES: No left ventriculogram was performed.    CORONARY VESSELS: The coronary circulation is right dominant.  LM:   --  LM: Angiography showed minor luminal irregularities with no flow  limiting lesions.  LAD:   --  Proximal LAD: There was a 100 % stenosis.  CX:   --  Proximal circumflex: There was a tubular 95 % stenosis at the  site of a prior stent, in-stent.  RCA:   --  Proximal RCA: There was a 100 % stenosis.  --  RPDA: There was a tubular 90 % stenosis distal to the graft  anastomosis. The lesion was irregularly contoured, ulcerated, and complex.  --  RPLS: There was a 90 % stenosis.  GRAFTS:   --  Graft to the mid LAD: The graft was a LIMA. It was normal.  --  Graft to the 1st diagonal: The graft was a saphenous vein graft from  the aorta. Graft angiography showed minor luminal irregularities.  --  Graft to the RPDA: The graft was a saphenous vein graft from the aorta.  It was normal.    COMPLICATIONS: There were no complications.    DIAGNOSTIC IMPRESSIONS: Severe, new lesions in the Lcx (restenosis and  RPDA). High LVEDP    DIAGNOSTIC RECOMMENDATIONS: Admit to CCU to wean off Bipap and titrate med  Rx. EP eval for SCD. Plan for PCI early next week pending respiratory  status    LABS:   CBC Full  -  ( 22 Jan 2019 05:45 )  WBC Count : 3.8 K/uL  Hemoglobin : 11.5 g/dL  Hematocrit : 34.3 %  Platelet Count - Automated : 74 K/uL  Mean Cell Volume : 99.5 fl  Mean Cell Hemoglobin : 33.3 pg  Mean Cell Hemoglobin Concentration : 33.4 gm/dL    01-22    139  |  100  |  26<H>  ----------------------------<  177<H>  3.5   |  27  |  0.90    Ca    8.8      22 Jan 2019 05:45  Phos  3.2     01-22  Mg     1.8     01-22    IMPRESSION AND PLAN:  78M CAD s/p 4vCABG 1997 and multiple PCIs, HTN, PUD, DM2, HFrEF transfer from [facility] after unwitnessed fall with VFib arrest requiring 20 min CPR, extubated on 1/9 to BiPAP with hospital course complicated by aspiration PNA. Transferred to [facility] for C, found to have severe flow limiting lesions in Cx and RPDA, planned for revascularization after medication optimization and compensation of CHF.    1) arrest/NSTEMI  Preserved EF, moderate AS, moderate LVH.   Cath with LCx and RPDA    ·	Continue medical management with aspirin 81 mg daily, clopidogrel 75 mg daily, atorvastatin 40 mg nightly, metoprolol succinate 50 mg daily, losartan 50 mg daily, furosemide 40 mg daily.   ·	No ICD indicated at this time per EP - Waiting time after MI is 40 days if no intervention or 3 months after PCI  ·	Will discuss with interventional cardiology Dr. Roberts regarding timing of PCI. I am somewhat apprehensive in aggressive intervention in the setting of his severe neurologic deficits and long term prognosis. Comment from neurology would be appreciated.     Eddie Morgan MD, MPH, GABY  Cardiovascular Specialist Attending  [facility]  C: [phone]  E: [email or web address]  (Cardiology nocturnist available every night 7 pm - 7 am; available week days for follow-up; general cardiology consult coverage weekend days) SUBJ:  Hemodynamically stable. 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 Unable to speak with me but appears to give subtle head movements in response to questioning.     MEDICATIONS  (STANDING):  allopurinol 300 milliGRAM(s) Oral daily  amiodarone    Tablet 200 milliGRAM(s) Oral daily  ammonium lactate 12% Lotion 1 Application(s) Topical two times a day  aspirin enteric coated 81 milliGRAM(s) Oral daily  atorvastatin 40 milliGRAM(s) Oral at bedtime  chlorhexidine 4% Liquid 1 Application(s) Topical <User Schedule>  cholecalciferol 2000 Unit(s) Oral daily  clopidogrel Tablet 75 milliGRAM(s) Oral daily  dextrose 5%. 1000 milliLiter(s) (50 mL/Hr) IV Continuous <Continuous>  dextrose 50% Injectable 12.5 Gram(s) IV Push once  dextrose 50% Injectable 25 Gram(s) IV Push once  dextrose 50% Injectable 25 Gram(s) IV Push once  doxazosin 2 milliGRAM(s) Oral at bedtime  furosemide    Tablet 40 milliGRAM(s) Oral daily  heparin  Injectable 5000 Unit(s) SubCutaneous every 8 hours  insulin lispro (HumaLOG) corrective regimen sliding scale   SubCutaneous three times a day before meals  insulin lispro (HumaLOG) corrective regimen sliding scale   SubCutaneous at bedtime  insulin lispro Injectable (HumaLOG) 2 Unit(s) SubCutaneous three times a day before meals  losartan 50 milliGRAM(s) Oral daily  metoprolol succinate ER 50 milliGRAM(s) Oral daily  nystatin    Suspension [id number] Unit(s) Oral four times a day  pantoprazole    Tablet 40 milliGRAM(s) Oral before breakfast    MEDICATIONS  (PRN):  acetaminophen   Tablet .. 650 milliGRAM(s) Oral every 6 hours PRN Mild Pain (1 - 3), Moderate Pain (4 - 6)  dextrose 40% Gel 15 Gram(s) Oral once PRN Blood Glucose LESS THAN 70 milliGRAM(s)/deciliter  glucagon  Injectable 1 milliGRAM(s) IntraMuscular once PRN Glucose LESS THAN 70 milligrams/deciliter    Vital Signs Last 24 Hrs  T(C): 36.8 (22 Jan 2019 04:16), Max: 36.8 (21 Jan 2019 21:23)  T(F): 98.2 (22 Jan 2019 04:16), Max: 98.3 (21 Jan 2019 21:23)  HR: 88 (22 Jan 2019 12:35) (80 - 88)  BP: 112/68 (22 Jan 2019 12:35) (106/63 - 115/67)  RR: 18 (22 Jan 2019 12:35) (18 - 18)  SpO2: 95% (22 Jan 2019 12:35) (95% - 98%)    REVIEW OF SYSTEMS:  Unable to obtain due to unable to communicate.     PHYSICAL EXAM:  · CONSTITUTIONAL: Well-developed  · EYES: no drainage or redness  · NECK: supple  ·RESPIRATORY: reduced breath sounds  · CARDIOVASCULAR: regular rate and rhythm  . GASTROINTESTINAL:  no distention; no masses palpable  · EXTREMITIES: No cyanosis, clubbing. Positive edema L>R  · VASCULAR:  Equal and normal pulses (radial)  ·NEUROLOGICAL:  Alert and oriented x 1-2  · SKIN: No lesions; no rash  . LYMPH NODES: No lymphadedenopathy  · MUSCULOSKELETAL:  No calf tenderness    TTE 1/16/2019  Conclusions:  1. Moderate-severe mitral regurgitation.  2. Calcified trileaflet aortic valve with decreased  opening. Peak transaortic valve gradient equals 27 mm Hg,  mean transaortic valve gradient equals 17 mm Hg, estimated  aortic valve area equals 1.3 sqcm (by continuity equation),  aortic valve velocity time integral equals 58 cm,  consistent with moderate aortic stenosis. Mild aortic  regurgitation.  3. Moderate concentric left ventricular hypertrophy.  4. Endocardial visualization enhanced with intravenous  injection of Ultrasonic Enhancing Agent (Definity). Normal  left ventricular systolic function.  5. Mild diastolic dysfunction (Stage I).  6. Normal right ventricular size and function.  7. Estimated pulmonary artery systolic pressure equals 42  mm Hg, assuming right atrial pressure equals 8 mm Hg,  consistent with mild pulmonary pressures.  *** No previous Echo exam.    Cardiac Cath 1/11/2019  PROCEDURE:  --  Left heart catheterization.  --  Left coronary angiography.  --  Right coronary angiography.  --  Bypass graft angiography.  --  Sonosite - Diagnostic.  --  Hemostasis with Angioseal.    VENTRICLES: No left ventriculogram was performed.    CORONARY VESSELS: The coronary circulation is right dominant.  LM:   --  LM: Angiography showed minor luminal irregularities with no flow  limiting lesions.  LAD:   --  Proximal LAD: There was a 100 % stenosis.  CX:   --  Proximal circumflex: There was a tubular 95 % stenosis at the  site of a prior stent, in-stent.  RCA:   --  Proximal RCA: There was a 100 % stenosis.  --  RPDA: There was a tubular 90 % stenosis distal to the graft  anastomosis. The lesion was irregularly contoured, ulcerated, and complex.  --  RPLS: There was a 90 % stenosis.  GRAFTS:   --  Graft to the mid LAD: The graft was a LIMA. It was normal.  --  Graft to the 1st diagonal: The graft was a saphenous vein graft from  the aorta. Graft angiography showed minor luminal irregularities.  --  Graft to the RPDA: The graft was a saphenous vein graft from the aorta.  It was normal.    COMPLICATIONS: There were no complications.    DIAGNOSTIC IMPRESSIONS: Severe, new lesions in the Lcx (restenosis and  RPDA). High LVEDP    DIAGNOSTIC RECOMMENDATIONS: Admit to CCU to wean off Bipap and titrate med  Rx. EP eval for SCD. Plan for PCI early next week pending respiratory  status    LABS:   CBC Full  -  ( 22 Jan 2019 05:45 )  WBC Count : 3.8 K/uL  Hemoglobin : 11.5 g/dL  Hematocrit : 34.3 %  Platelet Count - Automated : 74 K/uL  Mean Cell Volume : 99.5 fl  Mean Cell Hemoglobin : 33.3 pg  Mean Cell Hemoglobin Concentration : 33.4 gm/dL    01-22    139  |  100  |  26<H>  ----------------------------<  177<H>  3.5   |  27  |  0.90    Ca    8.8      22 Jan 2019 05:45  Phos  3.2     01-22  Mg     1.8     01-22    IMPRESSION AND PLAN:  78M CAD s/p 4vCABG 1997 and multiple PCIs, HTN, PUD, DM2, HFrEF transfer from [facility] after unwitnessed fall with VFib arrest requiring 20 min CPR, extubated on 1/9 to BiPAP with hospital course complicated by aspiration PNA. Transferred to [facility] for [facility], found to have severe flow limiting lesions in Cx and RPDA, planned for revascularization after medication optimization and compensation of CHF.    1) arrest/NSTEMI  Preserved EF, moderate AS, moderate LVH.   Cath with LCx and RPDA    ·	Continue medical management with aspirin 81 mg daily, clopidogrel 75 mg daily, atorvastatin 40 mg nightly, metoprolol succinate 50 mg daily, losartan 50 mg daily, furosemide 40 mg daily.   ·	No ICD indicated at this time per EP - Waiting time after MI is 40 days if no intervention or 3 months after PCI  ·	Will discuss with interventional cardiology Dr. Roberts regarding timing of PCI. I am somewhat apprehensive in aggressive intervention in the setting of his severe neurologic deficits and long term prognosis. Comment from neurology would be appreciated.     Eddie Morgan MD, MPH, GABY  Cardiovascular Specialist Attending  [facility]  C: [phone]  E: [email or web address]  (Cardiology nocturnist available every night 7 pm - 7 am; available week days for follow-up; general cardiology consult coverage weekend days)

## 2019-01-22 NOTE — SWALLOW BEDSIDE ASSESSMENT ADULT - ORAL PHASE
Delayed oral transit time/> with chewables Within functional limits Delayed oral transit time/>45s, +piecemeal deglutition

## 2019-01-22 NOTE — PROGRESS NOTE ADULT - PROBLEM SELECTOR PLAN 10
DVT PPx [facility]  Aspiration precaution- speech/swallow eval   Fall precaution  PT Eval recommending [facility]. Daughter also with interest in LTC facility, will require  further discussion with case management pending clinical course.
DVT PPx [facility]  Aspiration precaution- speech/swallow eval given concern for tolerance of thin liquids noted by RN  Fall precaution  PT Eval recommending [facility]. Daughter also with interest in LTC facility. will discuss further with case management. pt not yet medically ready for d/c
DVT PPx [facility]  Aspiration precaution- speech/swallow eval   Fall precaution  PT Eval recommending [facility]. Daughter also with interest in LTC facility. will discuss further with case management pending clinical course. pt not yet medically ready for d/c
DVT PPx [facility]  Aspiration precaution- speech/swallow eval   Fall precaution  PT Eval recommending [facility]. Daughter also with interest in LTC facility, will require  further discussion with case management pending clinical course.
DVT PPx [facility]  Aspiration precaution- speech/swallow eval   Fall precaution  PT Eval recommending [facility]. Daughter also with interest in LTC facility, will require  further discussion with case management pending clinical course.
DVT PPx [facility]  Aspiration precaution- speech/swallow eval   Fall precaution  PT Eval recommending [facility]. Daughter also with interest in LTC facility. will discuss further with case management pending clinical course. pt not yet medically ready for d/c
DVT PPx [facility]  Aspiration precaution- speech/swallow eval   Fall precaution  PT Eval recommending [facility]. Daughter also with interest in LTC facility. will discuss further with case management pending clinical course. pt not yet medically ready for d/c
DVT PPx [facility]  Aspiration precaution- speech/swallow eval given concern for tolerance of thin liquids noted by RN  Fall precaution  PT Eval recommending [facility]. Daughter also with interest in LTC facility. will discuss further with case management. pt not yet medically ready for d/c
DVT PPx [facility]  Aspiration precaution  Fall precaution  PT Eval pending. per case management pts daughter is interested in LTC as pt will unlikely be able to care for himself at home

## 2019-01-22 NOTE — SWALLOW BEDSIDE ASSESSMENT ADULT - ADDITIONAL RECOMMENDATIONS
Maintain good oral hygiene.  POC pending MBS.
Maintain good oral hygiene.  This service will continue to f/u.

## 2019-01-22 NOTE — SWALLOW BEDSIDE ASSESSMENT ADULT - SWALLOW EVAL: PATIENT/FAMILY GOALS STATEMENT
Pt's daughter at bedside and HCP via telephone endorse "occasional coughing or choking with water" in the past few days. Also report pt's mentation and vocal quality remain unchanged. Additional info per previous report: Pt and daughter deny h/o dysphagia, GERD, PNA prior to initial admission to hospital. Per daughter pt with episode of reported aspiration PNA @ [facility] however pt was not on an oral diet at that time; was receiving TFs via NGT. Daughter endorses minimal voicing primarily, however states pt occasionally with "perfect, normal voice."
Pt and daughter deny h/o dysphagia, GERD, PNA prior to initial admission to hospital. Per daughter pt with episode of reported aspiration PNA @ [facility] however pt was not on an oral diet at that time; was receiving TFs via NGT. Daughter endorses minimal voicing primarily, however states pt occasionally with "perfect, normal voice."

## 2019-01-22 NOTE — PROGRESS NOTE ADULT - PROBLEM SELECTOR PLAN 1
-pt with increasing word finding abilities, visual impairments, L sided weakness   -likely cause of acute encephalopathy with possible other contributing factors (ICU delirium etc). Low suspicion for infection  -cont q4h neurochecks  -neuro consult appreciated  -out of bed to chair, frequent reorientation  -appreciate neuro eval- MR head, MRA head and neck unremarkable  -appreciate ophthalmology eval- no acute intervention, outpatient f/u  -EEG with diffuse cerebral dysfunction  -PT/OT, speech/swallow f/u- recommending dysphagia 1 diet, MBS and consider ENT c/s as pt with hypophonia, possible laryngeal pathology.   -eventual rehab when medically stable

## 2019-01-22 NOTE — PROGRESS NOTE ADULT - PROBLEM SELECTOR PLAN 6
-Stable Hb at 11 with MCV> 100  - B12/Folate- ok    NOTED THROMBOCYTOPENIA- unclear cause. if continues to drop will need to rule out HIT. mild improvement today. hold dvt ppx if plts drop <50,000

## 2019-01-22 NOTE — PROGRESS NOTE ADULT - NSHPATTENDINGPLANDISCUSS_GEN_ALL_CORE
pts daughter at bedside. case management. medicine NP.
To reach Cardiology Attending call during weekdays Spectra [phone] or Fellow [phone].
To reach Cardiology Attending call during weekdays Spectra [phone] or Fellow [phone].
pts daughter Samanta on phone. updated her on plan of care.
pts daughter Samanta on the Phone.

## 2019-01-22 NOTE — SWALLOW BEDSIDE ASSESSMENT ADULT - PHARYNGEAL PHASE
intermittently delayed pharyngeal swallow trigger intermittently delayed pharyngeal swallow trigger, +audible swallow and suspected dyscoordination with thin liquids Within functional limits

## 2019-01-22 NOTE — SWALLOW BEDSIDE ASSESSMENT ADULT - SWALLOW EVAL: RECOMMENDED DIET
Dysphagia 1 with Nectar thickened liquids.
Dysphagia 2 with Thin liquids. 100% Supervision/Assistance

## 2019-01-22 NOTE — SWALLOW BEDSIDE ASSESSMENT ADULT - SPECIFY REASON(S)
to assess the swallow mechanism; r/o dysphagia.
to assess the swallow mechanism; r/o dysphagia.
to re-assess the swallow mechanism; r/o dysphagia.

## 2019-01-22 NOTE — PROGRESS NOTE ADULT - ASSESSMENT
77yo male with hx of CAD with s/p CABG 4v in 1997, multiple stents, HTN, PUD, Gout, DM2, and Acute HFrEF (EF 30-35%), presented to [facility] from [facility] for NSTEMI management after stabilization from complications of V. Fib arrest s/p ROSC after 20mins of CPR and ICU stay complicated by sepsis secondary to Aspiration PNA. Pt s/p Cardiac cath at [facility] revealing multiple vessel disease without any intervention, recommending medical optimization and stabilization prior to PCI. 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     MRI/MRA  EXAM:  MR ANGIO BRAIN                        EXAM:  MR ANGIO NECK                        EXAM:  MR BRAIN                          PROCEDURE DATE:  01/17/2019      INTERPRETATION:  History: Left-sided weakness. V. fib arrest for 20   minutes with CPR..  Description: A noncontrast brain MRI, 3-D time-of-flight brain MRA, 3D   TOF neck MRA, and 2D time of flight neck MRA were performed.  Comparison: Head CT 01/12/2019..    Brain MRI:  Sagittal T1, axial T1, T2, FLAIR, SWI, GRE, and diffusion-weighted series   with ADC maps were performed.  There is no evidence for acute infarct, acute hemorrhage, mass effect, or   hydrocephalus.  A punctate focus of decreased SWI signal involves the right temporal lobe   without surrounding edema which may reflect an old microbleed,   calcification, or a tiny cavernous malformation.  Moderate patchy foci of increased T2 and FLAIR signal are present in the   periventricular and subcortical white matter which most likely represent   chronic microvascular ischemic changes given the patient's age.   Cerebral volume loss is present.    Brain MRA:  There is no evidence for focal stenosis, major vessel occlusion, or   aneurysm. Tiny aneurysms could be beyond the resolution of MRA technique.    Neck MRA:  No significant carotid or vertebral artery stenosis is noted in the neck   by NASCET criteria.    IMPRESSION:  1. On the brain MRI, there is no evidence for acute infarct or acute   hemorrhage. Moderate chronic white matter changes are present.  2. No evidence for significant intracranial or extracranial stenosis on   the MRA studies.    FREDERIC NASH M.D., ATTENDING RADIOLOGIST  This document has been electronically signed. Jan 18 2019  8:52AM

## 2019-01-22 NOTE — SWALLOW BEDSIDE ASSESSMENT ADULT - ASR SWALLOW REFERRAL
Consider ENT consult to r/o laryngeal pathology if vocal quality does not improve
Consider ENT consult to r/o laryngeal pathology if vocal quality does not improve

## 2019-01-22 NOTE — SWALLOW BEDSIDE ASSESSMENT ADULT - SLP GENERAL OBSERVATIONS
Pt encountered semi upright in bed, being fed dinner by daughter, repositioned fully upright and daughter educated re: importance of upright positioning for PO intake. Pt A&Ox1, grossly oriented to place. Pt primarily aphonic throughout assessment; able to initiate phonation given cues to increase effort. +Hoarse vocal quality when phonation initiated. +Paucity of verbal output. Pt stated "I can't talk."
Pt encountered supine in bed, slumped to L side, positioned upright for purpose of evaluation with external support. Pt's head tilted to L despite attempts at repositioning. Pt A&Ox1, grossly oriented to time, stated place as [place]. Pt primarily aphonic throughout assessment; able to initiate phonation given cues to increase effort. +Hoarse vocal quality when phonation initiated. +Paucity of verbal output.

## 2019-01-22 NOTE — SWALLOW BEDSIDE ASSESSMENT ADULT - ASR SWALLOW RECOMMEND DIAG
D/w NPs Litzy as well as pt's daughter/HCP, MACK TBS for 1/23 upon receipt of order
Can consider MBS if aspiration PNA is part of ddx

## 2019-01-22 NOTE — SWALLOW BEDSIDE ASSESSMENT ADULT - ASR SWALLOW ASPIRATION MONITOR
Monitor for s/s aspiration/laryngeal penetration. If noted:  D/C p.o. intake, provide non-oral nutrition/hydration/meds, and contact this service @ [phone]/throat clearing/upper respiratory infection/fever/pneumonia/gurgly voice/change of breathing pattern/cough
change of breathing pattern/cough/fever/throat clearing/Monitor for s/s aspiration/laryngeal penetration. If noted:  D/C p.o. intake, provide non-oral nutrition/hydration/meds, and contact this service @ [phone]/rita voice/pneumonia/upper respiratory infection

## 2019-01-22 NOTE — PROGRESS NOTE ADULT - PROBLEM SELECTOR PLAN 3
-s/p  V. 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   -Optimize medical management  -PCI prior to D/C- call cards back today to assess for this   -Continue ASA/Plavix/Metoprolol/Statin  -TTE with EF 65%, severe MR.

## 2019-01-22 NOTE — SWALLOW BEDSIDE ASSESSMENT ADULT - SWALLOW EVAL: DIAGNOSIS
Patient presents with 1) oral and suspected pharyngeal dysphagia characterized by impaired oral management greatest with chewables, intermittently delayed pharyngeal swallow trigger, and audible swallows and suspected dyscoordination with thin liquids. Although no s/s of laryngeal penetration and/or aspiration observed on this exam, pt's family reports episodes of coughing/choking with thin fluid in past 2 days. 2) Dysphonia. Pt primarily aphonic throughout assessment; suspect in part related to poor effort and reduced breath support. Pt able to initiate phonation intermittently; hoarse vocal quality noted.
Patient presents with 1) oral and suspected pharyngeal dysphagia characterized by impaired oral management greatest with chewables, intermittently delayed pharyngeal swallow trigger, and discoordination during consecutive straw sips of thin liquids with single episode of laryngeal penetration and/or aspiration with same. Volume/rate reduction via single sips eliminated s/s of laryngeal penetration and/or aspiration. 2) Dysphonia. Pt primarily aphonic throughout assessment; suspect in part related to poor effort and reduced breath support. Pt able to initiate phonation intermittently; hoarse vocal quality noted.
Attempted to see patient for bedside swallow evaluation. Pt lethargic, eyes closing throughout interview. Pt requesting this service perform assessment tomorrow, 1/18. NP Arin aware of POC.

## 2019-01-22 NOTE — SWALLOW BEDSIDE ASSESSMENT ADULT - NS ASR SWALLOW FINDINGS DISCUS
Nursing/Patient/Family/RN, NPs Litzy, pt's daughter at bedside and HCP Samanta via telephone
Nursing/Patient/RN, NP Macy Santiago

## 2019-01-22 NOTE — SWALLOW BEDSIDE ASSESSMENT ADULT - SLP PERTINENT HISTORY OF CURRENT PROBLEM
78M with CAD with s/p CABG 4v in 1997, multiple stents, HTN, PUD, Gout, DM2 presented to [facility] on Dec 31st after unwitnessed fall 2/2 VT arrest, returned to rhythm after 20 minutes of CPR, intubated and moved to ICU. Self-extubated 1/9. Pt's [facility] course was c/b aspiration pna, pulmonary edema. He was found to have EF of 30-35% and was transferred to [facility] for cath. In CCU weaned off BiPAP to NC 1/11. Cath demonstrated 90 and 95% occlusion of l circ and rpda, respectively however unable to intervene on lesions. Pt is now pending EP eval for AICD and requiring IV lasix for diuresis as well as BiPAP overnight. Pt's daughter states his mental status changed since he fell. Pt reported falling on a Friday and was found on a Sunday. He has been confused since.

## 2019-01-23 DIAGNOSIS — R49.0 DYSPHONIA: ICD-10-CM

## 2019-01-23 LAB
ANION GAP SERPL CALC-SCNC: 12 MMOL/L — SIGNIFICANT CHANGE UP (ref 5–17)
BUN SERPL-MCNC: 26 MG/DL — HIGH (ref 7–23)
CALCIUM SERPL-MCNC: 8.9 MG/DL — SIGNIFICANT CHANGE UP (ref 8.4–10.5)
CHLORIDE SERPL-SCNC: 99 MMOL/L — SIGNIFICANT CHANGE UP (ref 96–108)
CO2 SERPL-SCNC: 27 MMOL/L — SIGNIFICANT CHANGE UP (ref 22–31)
CREAT SERPL-MCNC: 0.93 MG/DL — SIGNIFICANT CHANGE UP (ref 0.5–1.3)
GLUCOSE BLDC GLUCOMTR-MCNC: 184 MG/DL — HIGH (ref 70–99)
GLUCOSE BLDC GLUCOMTR-MCNC: 208 MG/DL — HIGH (ref 70–99)
GLUCOSE BLDC GLUCOMTR-MCNC: 241 MG/DL — HIGH (ref 70–99)
GLUCOSE BLDC GLUCOMTR-MCNC: 268 MG/DL — HIGH (ref 70–99)
GLUCOSE SERPL-MCNC: 182 MG/DL — HIGH (ref 70–99)
HCT VFR BLD CALC: 34.7 % — LOW (ref 39–50)
HGB BLD-MCNC: 11.5 G/DL — LOW (ref 13–17)
MCHC RBC-ENTMCNC: 32.8 PG — SIGNIFICANT CHANGE UP (ref 27–34)
MCHC RBC-ENTMCNC: 33.1 GM/DL — SIGNIFICANT CHANGE UP (ref 32–36)
MCV RBC AUTO: 99.2 FL — SIGNIFICANT CHANGE UP (ref 80–100)
PLATELET # BLD AUTO: 81 K/UL — LOW (ref 150–400)
POTASSIUM SERPL-MCNC: 3.4 MMOL/L — LOW (ref 3.5–5.3)
POTASSIUM SERPL-SCNC: 3.4 MMOL/L — LOW (ref 3.5–5.3)
RBC # BLD: 3.5 M/UL — LOW (ref 4.2–5.8)
RBC # FLD: 13.4 % — SIGNIFICANT CHANGE UP (ref 10.3–14.5)
SODIUM SERPL-SCNC: 138 MMOL/L — SIGNIFICANT CHANGE UP (ref 135–145)
WBC # BLD: 4.4 K/UL — SIGNIFICANT CHANGE UP (ref 3.8–10.5)
WBC # FLD AUTO: 4.4 K/UL — SIGNIFICANT CHANGE UP (ref 3.8–10.5)

## 2019-01-23 PROCEDURE — 99233 SBSQ HOSP IP/OBS HIGH 50: CPT

## 2019-01-23 PROCEDURE — 31575 DIAGNOSTIC LARYNGOSCOPY: CPT

## 2019-01-23 PROCEDURE — 99221 1ST HOSP IP/OBS SF/LOW 40: CPT | Mod: 25

## 2019-01-23 PROCEDURE — 74230 X-RAY XM SWLNG FUNCJ C+: CPT | Mod: 26

## 2019-01-23 PROCEDURE — 99232 SBSQ HOSP IP/OBS MODERATE 35: CPT

## 2019-01-23 RX ORDER — POTASSIUM CHLORIDE 20 MEQ
40 PACKET (EA) ORAL ONCE
Qty: 0 | Refills: 0 | Status: DISCONTINUED | OUTPATIENT
Start: 2019-01-23 | End: 2019-01-23

## 2019-01-23 RX ORDER — POTASSIUM CHLORIDE 20 MEQ
40 PACKET (EA) ORAL ONCE
Qty: 0 | Refills: 0 | Status: COMPLETED | OUTPATIENT
Start: 2019-01-23 | End: 2019-01-23

## 2019-01-23 RX ADMIN — Medication 40 MILLIEQUIVALENT(S): at 13:09

## 2019-01-23 RX ADMIN — Medication 2 UNIT(S): at 13:08

## 2019-01-23 RX ADMIN — CHLORHEXIDINE GLUCONATE 1 APPLICATION(S): 213 SOLUTION TOPICAL at 06:25

## 2019-01-23 RX ADMIN — AMIODARONE HYDROCHLORIDE 200 MILLIGRAM(S): 400 TABLET ORAL at 05:38

## 2019-01-23 RX ADMIN — HEPARIN SODIUM 5000 UNIT(S): 5000 INJECTION INTRAVENOUS; SUBCUTANEOUS at 13:09

## 2019-01-23 RX ADMIN — Medication 50 MILLIGRAM(S): at 05:38

## 2019-01-23 RX ADMIN — Medication 500000 UNIT(S): at 05:38

## 2019-01-23 RX ADMIN — Medication 40 MILLIGRAM(S): at 05:38

## 2019-01-23 RX ADMIN — CLOPIDOGREL BISULFATE 75 MILLIGRAM(S): 75 TABLET, FILM COATED ORAL at 09:27

## 2019-01-23 RX ADMIN — Medication 1 APPLICATION(S): at 05:39

## 2019-01-23 RX ADMIN — Medication 2 UNIT(S): at 18:00

## 2019-01-23 RX ADMIN — LOSARTAN POTASSIUM 50 MILLIGRAM(S): 100 TABLET, FILM COATED ORAL at 05:39

## 2019-01-23 RX ADMIN — ATORVASTATIN CALCIUM 40 MILLIGRAM(S): 80 TABLET, FILM COATED ORAL at 21:14

## 2019-01-23 RX ADMIN — Medication 2: at 09:25

## 2019-01-23 RX ADMIN — HEPARIN SODIUM 5000 UNIT(S): 5000 INJECTION INTRAVENOUS; SUBCUTANEOUS at 21:14

## 2019-01-23 RX ADMIN — Medication 4: at 18:00

## 2019-01-23 RX ADMIN — Medication 500000 UNIT(S): at 11:32

## 2019-01-23 RX ADMIN — HEPARIN SODIUM 5000 UNIT(S): 5000 INJECTION INTRAVENOUS; SUBCUTANEOUS at 05:39

## 2019-01-23 RX ADMIN — Medication 2000 UNIT(S): at 09:27

## 2019-01-23 RX ADMIN — Medication 1 APPLICATION(S): at 18:01

## 2019-01-23 RX ADMIN — Medication 2 UNIT(S): at 09:25

## 2019-01-23 RX ADMIN — Medication 2 MILLIGRAM(S): at 21:14

## 2019-01-23 RX ADMIN — PANTOPRAZOLE SODIUM 40 MILLIGRAM(S): 20 TABLET, DELAYED RELEASE ORAL at 05:38

## 2019-01-23 RX ADMIN — Medication 500000 UNIT(S): at 18:00

## 2019-01-23 RX ADMIN — Medication 81 MILLIGRAM(S): at 09:27

## 2019-01-23 RX ADMIN — Medication 300 MILLIGRAM(S): at 09:26

## 2019-01-23 RX ADMIN — Medication 6: at 13:08

## 2019-01-23 NOTE — SWALLOW VFSS/MBS ASSESSMENT ADULT - ORAL PHASE
+ Tongue pumping, mild to mod lingual residue that clears with latent reswallow/Residue in oral cavity/Delayed oral transit time/Reduced anterior - posterior transport/Incomplete tongue to palate contact + Tongue pumping, mild to mod lingual residue that clears with latent reswallow Delayed oral transit time/Incomplete tongue to palate contact/+ Tongue pumping, mod spillover to the valleculae, trace to mild lingual residue/Residue in oral cavity/Reduced anterior - posterior transport Incomplete tongue to palate contact/max spillover to the valleculae, up to max spillover to the pyriform sinuses; mild lingual residue/Residue in oral cavity/Reduced anterior - posterior transport Delayed oral transit time/Residue in oral cavity/Reduced anterior - posterior transport/Pt unable to transfer majority of bolus, with swallow x1 of partial bolus unable to initiate transfer remainder of bolus, required liquid wash.

## 2019-01-23 NOTE — SWALLOW VFSS/MBS ASSESSMENT ADULT - NS SWALLOW VFSS REC ASPIR MON
Monitor for s/s aspiration/laryngeal penetration. If noted:  D/C p.o. intake, provide non-oral nutrition/hydration/meds, and contact this service @ [phone]/fever/cough/gurgly voice/upper respiratory infection/pneumonia/throat clearing/change of breathing pattern

## 2019-01-23 NOTE — PROGRESS NOTE ADULT - ATTENDING COMMENTS
EZIO Khan  pager [phone] pending ent eval, MACK, follow up from cards/neuro and decision on PCI prior to discharge.     EZIO Khan  pager [phone]

## 2019-01-23 NOTE — CONSULT NOTE ADULT - ASSESSMENT
77 y/o male with PMHx of CAD with s/p CABG 4v in 1997, multiple stents, HTN, admitted anoxic brain injury following V-fib arrest, found to be Hypophonic. 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 No VC paralysis.

## 2019-01-23 NOTE — PROGRESS NOTE ADULT - PROBLEM SELECTOR PLAN 2
-s/p  V. 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   -Optimize medical management  -PCI prior to D/C- will discuss risk/benfit with cards  -Continue ASA/Plavix/Metoprolol/Statin  -TTE with EF 65%, severe MR.

## 2019-01-23 NOTE — PROGRESS NOTE ADULT - SUBJECTIVE AND OBJECTIVE BOX
SUBJ:  No acute events overnight. He was able to answer some of my questions today albeit he has a significant degree with speech/hypophonia. He is aware of his surroundings and knows he is in a hospital. Denies chest pain and shortness of breath.     MEDICATIONS  (STANDING):  allopurinol 300 milliGRAM(s) Oral daily  amiodarone    Tablet 200 milliGRAM(s) Oral daily  ammonium lactate 12% Lotion 1 Application(s) Topical two times a day  aspirin enteric coated 81 milliGRAM(s) Oral daily  atorvastatin 40 milliGRAM(s) Oral at bedtime  chlorhexidine 4% Liquid 1 Application(s) Topical <User Schedule>  cholecalciferol 2000 Unit(s) Oral daily  clopidogrel Tablet 75 milliGRAM(s) Oral daily  dextrose 5%. 1000 milliLiter(s) (50 mL/Hr) IV Continuous <Continuous>  dextrose 50% Injectable 12.5 Gram(s) IV Push once  dextrose 50% Injectable 25 Gram(s) IV Push once  dextrose 50% Injectable 25 Gram(s) IV Push once  doxazosin 2 milliGRAM(s) Oral at bedtime  furosemide    Tablet 40 milliGRAM(s) Oral daily  heparin  Injectable 5000 Unit(s) SubCutaneous every 8 hours  insulin lispro (HumaLOG) corrective regimen sliding scale   SubCutaneous three times a day before meals  insulin lispro (HumaLOG) corrective regimen sliding scale   SubCutaneous at bedtime  insulin lispro Injectable (HumaLOG) 2 Unit(s) SubCutaneous three times a day before meals  losartan 50 milliGRAM(s) Oral daily  metoprolol succinate ER 50 milliGRAM(s) Oral daily  nystatin    Suspension 278083 Unit(s) Oral four times a day  pantoprazole    Tablet 40 milliGRAM(s) Oral before breakfast    MEDICATIONS  (PRN):  acetaminophen   Tablet .. 650 milliGRAM(s) Oral every 6 hours PRN Mild Pain (1 - 3), Moderate Pain (4 - 6)  dextrose 40% Gel 15 Gram(s) Oral once PRN Blood Glucose LESS THAN 70 milliGRAM(s)/deciliter  glucagon  Injectable 1 milliGRAM(s) IntraMuscular once PRN Glucose LESS THAN 70 milligrams/deciliter    Vital Signs Last 24 Hrs  T(C): 36.4 (23 Jan 2019 04:10), Max: 36.4 (23 Jan 2019 04:10)  T(F): 97.6 (23 Jan 2019 04:10), Max: 97.6 (23 Jan 2019 04:10)  HR: 79 (23 Jan 2019 05:33) (75 - 79)  BP: 122/70 (23 Jan 2019 05:33) (101/61 - 122/70)  RR: 18 (23 Jan 2019 04:10) (18 - 18)  SpO2: 94% (23 Jan 2019 04:10) (94% - 96%)    REVIEW OF SYSTEMS:  As per HPI, otherwise unremarkable. Difficult to obtain detail ROS.     PHYSICAL EXAM:  · CONSTITUTIONAL: Well-developed  · EYES: no drainage or redness  · NECK: supple  ·RESPIRATORY: reduced breath sounds  · CARDIOVASCULAR: regular rate and rhythm  . GASTROINTESTINAL:  no distention; no masses palpable  · EXTREMITIES: No cyanosis, clubbing. Positive edema L>R  · VASCULAR:  Equal and normal pulses (radial)  ·NEUROLOGICAL:  Alert and oriented x 1-2  · SKIN: No lesions; no rash  . LYMPH NODES: No lymphadedenopathy  · MUSCULOSKELETAL:  No calf tenderness    TELEMETRY: NSR    LABS:   CBC Full  -  ( 23 Jan 2019 06:27 )  WBC Count : 4.4 K/uL  Hemoglobin : 11.5 g/dL  Hematocrit : 34.7 %  Platelet Count - Automated : 81 K/uL  Mean Cell Volume : 99.2 fl  Mean Cell Hemoglobin : 32.8 pg  Mean Cell Hemoglobin Concentration : 33.1 gm/dL    01-23    138  |  99  |  26<H>  ----------------------------<  182<H>  3.4<L>   |  27  |  0.93    Ca    8.9      23 Jan 2019 06:27  Phos  3.2     01-22  Mg     1.8     01-22    IMPRESSION AND PLAN:  78M CAD s/p 4v CABG 1997 and multiple PCIs, HTN, PUD, DM2, HFrEF transfer from [facility] after unwitnessed fall with VFib arrest requiring 20 min CPR, extubated on 1/9 to BiPAP with hospital course complicated by aspiration PNA. Transferred to [facility] for [facility], found to have severe flow limiting lesions in Cx and RPDA, planned for revascularization after medication optimization and compensation of CHF.    1) Arrest/NSTEMI  Preserved EF, moderate AS, moderate LVH.   Cath with LCx and RPDA; intervention delayed.     ·	Continue medical management with aspirin 81 mg daily, clopidogrel 75 mg daily, atorvastatin 40 mg nightly, metoprolol succinate 50 mg daily, losartan 50 mg daily, furosemide 40 mg daily.   ·	No ICD indicated at this time per EP - Waiting time after MI is 40 days if no intervention or 3 months after PCI  ·	Ongoing discussion with interventional cardiology regarding timing of PCI; monitoring mental status to determine appropriateness.     Eddie Morgan MD, MPH, GABY  Cardiovascular Specialist Attending  [facility]  C: [phone]  E: [email or web address]  (Cardiology nocturnist available every night 7 pm - 7 am; available week days for follow-up; general cardiology consult coverage weekend days)

## 2019-01-23 NOTE — SWALLOW VFSS/MBS ASSESSMENT ADULT - RECOMMENDED CONSISTENCY
Dysphagia 1 with Nectar-thick liquids, NO STRAWS  MD/team Please enter the following as notes to RN: 1) Full assist with meals, 2) Crush meds or provide via alternate source, 3) NO STRAWS, 4) Provide small single bites and sips at slow rate, 5) Encourage successive swallows for oral and pharyngeal clearance, 6) Aspiration precautions. Monitor for s/s aspiration/laryngeal penetration. If noted:  D/C p.o. intake, provide non-oral nutrition/hydration/meds

## 2019-01-23 NOTE — PROGRESS NOTE ADULT - SUBJECTIVE AND OBJECTIVE BOX
SUBJECTIVE: Pt seen, chart reviewed.     Pt w/o complaints- nonverbal but answers yes & no appropriately.  No odor, redness, warmth, f/c/s noted  pain w/ touching wound on buttocks    REVIEW OF SYSTEMS    Skin/ MSK: see HPI  All other systems negative    aspirin enteric coated 81 milliGRAM(s) Oral daily  clopidogrel Tablet 75 milliGRAM(s) Oral daily  heparin  Injectable 5000 Unit(s) SubCutaneous every 8 hours  nystatin    Suspension 250003 Unit(s) Oral four times a day      Physical Exam:  Vital Signs Last 24 Hrs  T(C): 36.4 (23 Jan 2019 04:10), Max: 36.4 (23 Jan 2019 04:10)  T(F): 97.6 (23 Jan 2019 04:10), Max: 97.6 (23 Jan 2019 04:10)  HR: 79 (23 Jan 2019 05:33) (75 - 88)  BP: 122/70 (23 Jan 2019 05:33) (101/61 - 122/70)  BP(mean): --  RR: 18 (23 Jan 2019 04:10) (18 - 18)  SpO2: 94% (23 Jan 2019 04:10) (94% - 96%)  General Appearance:  NAD, A&Ox3, MO/ cachectic  NAD /  A&Ox3/  Obese/ frail  WD/ WN/ Disheveled  Versa Care P500 bed/ [place]      Musculoskeletal/Vascular:  very limited FROM   mild BLE edema equal  no DP/PT pulses palpable  BLE equally warm  no acute ischemia noted  BLE w/ mild hemosiderin staining  no deformities/ contractures  No Rt heel wounds  Lt heel 3cm x 2cm x 0cm healing stable DTI w/o blistering or open skin  No drainage  No odor, erythema, increased warmth, tenderness, induration, fluctuance    Skin:  moist w/ good turgor  Sacrum w/ 5cm x 5cm x0.1cm soft eschar w/ improvement noted in the areas of partial thickness skin loss  (wound more on Lt than Rt buttock)  no blistering   no serosanguinous drainage  No odor, erythema, increased warmth, tenderness, induration, fluctuance    LABS:                        11.5   4.4   )-----------( 81       ( 23 Jan 2019 06:27 )             34.7         RADIOLOGY & ADDITIONAL STUDIES  < from: VA Duplex Upper Ext Vein Scan, Left (01.14.19 @ 19:56) >  FINDINGS: The left internal jugular, subclavian, axillary, brachial,   basilic and cephalic veins are patent and compressible where applicable.    Doppler examination shows normal spontaneous and phasic flow.    IMPRESSION:     No evidence of left upper extremity deep venous thrombosis.    < end of copied text >

## 2019-01-23 NOTE — SWALLOW VFSS/MBS ASSESSMENT ADULT - ADDITIONAL RECOMMENDATIONS
Maintain good oral hygiene.   Restorative swallow therapy. Will continue to follow while patient is in-house, as schedule permits.

## 2019-01-23 NOTE — PROGRESS NOTE ADULT - ASSESSMENT
A/P: 77 y/o male with PMHx of CAD with s/p CABG 4v in 1997, multiple stents, HTN, PUD, Gout, DM2 presented to [facility] on Dec 31st after unwitnessed fall transferred here for Cath and Cardiac workup    Sacral evolving DTI w/ partial thickness skin loss- TRIAD paste  Lt heel resolving DTI - Cavilon  BLE elevation  Moisturize intact skin w/ SWEEN cream BID  con't Nutrition (as tolerated), Nutrition Consult  con't Offloading   con't Pericare  Care as per medicine will follow w/ you  Upon discharge f/u as outpatient at [facility] [address] [place] [address] [phone]  D/w team & attng  RESHMA BarajasC CWS [id number]  I spent 20  minutes w/ this pt of which more than 50% of the time was spent counseling & coordinating care of this pt.

## 2019-01-23 NOTE — CONSULT NOTE ADULT - SUBJECTIVE AND OBJECTIVE BOX
CC: Hypophonia    HPI: 77 y/o male with PMHx of CAD with s/p CABG 4v in 1997, multiple stents, HTN, PUD, Gout, DM2 presented to [facility] on Dec 31st after unwitnessed fall. According to family, patient lives alone and they believe they found him on the floor on the second day. Patient got admitted to [facility] on telemetry floor to r/o mechanical vs Arrhythmogenic syncope. While admitted on the floor patient had elevated troponin  followed by V. Fib arrest with full code, returned to rhythm after 20 minutes of CPR,  intubated and moved to ICU. Pt sustained an anoxic brain injury. Pt self extubated about 2 weeks ago and was placed on BIPAP. ENT was called to see pt fo hypophonia. Pt was seen by S/S and placed on a dysphagia diet. No modifying factors.         PAST MEDICAL & SURGICAL HISTORY:  MI (myocardial infarction)  HTN (hypertension)  HLD (hyperlipidemia)  DM (diabetes mellitus): A1c 7.1  CAD (coronary artery disease)  S/P primary angioplasty with coronary stent  S/P CABG x 4: 1997 by Dr. Smith    Allergies    No Known Allergies    Intolerances      MEDICATIONS  (STANDING):  allopurinol 300 milliGRAM(s) Oral daily  amiodarone    Tablet 200 milliGRAM(s) Oral daily  ammonium lactate 12% Lotion 1 Application(s) Topical two times a day  aspirin enteric coated 81 milliGRAM(s) Oral daily  atorvastatin 40 milliGRAM(s) Oral at bedtime  chlorhexidine 4% Liquid 1 Application(s) Topical <User Schedule>  cholecalciferol 2000 Unit(s) Oral daily  clopidogrel Tablet 75 milliGRAM(s) Oral daily  dextrose 5%. 1000 milliLiter(s) (50 mL/Hr) IV Continuous <Continuous>  dextrose 50% Injectable 12.5 Gram(s) IV Push once  dextrose 50% Injectable 25 Gram(s) IV Push once  dextrose 50% Injectable 25 Gram(s) IV Push once  doxazosin 2 milliGRAM(s) Oral at bedtime  furosemide    Tablet 40 milliGRAM(s) Oral daily  heparin  Injectable 5000 Unit(s) SubCutaneous every 8 hours  insulin lispro (HumaLOG) corrective regimen sliding scale   SubCutaneous three times a day before meals  insulin lispro (HumaLOG) corrective regimen sliding scale   SubCutaneous at bedtime  insulin lispro Injectable (HumaLOG) 2 Unit(s) SubCutaneous three times a day before meals  losartan 50 milliGRAM(s) Oral daily  metoprolol succinate ER 50 milliGRAM(s) Oral daily  nystatin    Suspension 006698 Unit(s) Oral four times a day  pantoprazole    Tablet 40 milliGRAM(s) Oral before breakfast    MEDICATIONS  (PRN):  acetaminophen   Tablet .. 650 milliGRAM(s) Oral every 6 hours PRN Mild Pain (1 - 3), Moderate Pain (4 - 6)  dextrose 40% Gel 15 Gram(s) Oral once PRN Blood Glucose LESS THAN 70 milliGRAM(s)/deciliter  glucagon  Injectable 1 milliGRAM(s) IntraMuscular once PRN Glucose LESS THAN 70 milligrams/deciliter      Social History:     Family history:     ROS:   ENT: all negative except as noted in HPI   CV: denies palpitations  Pulm: denies SOB, cough, hemoptysis  GI: denies change in apetite, indigestion, n/v  : denies pertinent urinary symptoms, urgency  Neuro: denies numbness/tingling, loss of sensation  Psych: denies anxiety  MS: denies muscle weakness, instability  Heme: denies easy bruising or bleeding  Endo: denies heat/cold intolerance, excessive sweating  Vascular: denies LE edema    Vital Signs Last 24 Hrs  T(C): 36.4 (23 Jan 2019 16:33), Max: 36.4 (23 Jan 2019 04:10)  T(F): 97.5 (23 Jan 2019 16:33), Max: 97.6 (23 Jan 2019 04:10)  HR: 80 (23 Jan 2019 16:33) (75 - 80)  BP: 124/68 (23 Jan 2019 16:33) (101/61 - 124/68)  BP(mean): --  RR: 16 (23 Jan 2019 16:33) (16 - 18)  SpO2: 98% (23 Jan 2019 16:33) (94% - 98%)                          11.5   4.4   )-----------( 81       ( 23 Jan 2019 06:27 )             34.7    01-23    138  |  99  |  26<H>  ----------------------------<  182<H>  3.4<L>   |  27  |  0.93    Ca    8.9      23 Jan 2019 06:27  Phos  3.2     01-22  Mg     1.8     01-22         PHYSICAL EXAM:  Gen: NAD  Skin: No rashes, bruises, or lesions  Head: Normocephalic, Atraumatic  Face: no edema, erythema, or fluctuance. Parotid glands soft without mass  Eyes: no scleral injection  Nose: Nares bilaterally patent, no discharge  Mouth: No Stridor / Drooling / Trismus.  Mucosa moist, tongue/uvula midline, oropharynx clear  Neck: Flat, supple, no lymphadenopathy, trachea midline, no masses  Lymphatic: No lymphadenopathy  Resp: breathing easily, no stridor  CV: no peripheral edema/cyanosis  GI: nondistended   Peripheral vascular: no JVD or edema  Neuro: facial nerve intact, no facial droop            Fiberoptic Indirect laryngoscopy:  (Scope #2 used) CC: Hypophonia    HPI: 79 y/o male with PMHx of CAD with s/p CABG 4v in 1997, multiple stents, HTN, PUD, Gout, DM2 presented to [facility] on Dec 31st after unwitnessed fall. According to family, patient lives alone and they believe they found him on the floor on the second day. Patient got admitted to [facility] on telemetry floor to r/o mechanical vs Arrhythmogenic syncope. While admitted on the floor patient had elevated troponin  followed by V. Fib arrest with full code, returned to rhythm after 20 minutes of CPR,  intubated and moved to ICU. Pt sustained an anoxic brain injury. Pt self extubated about 2 weeks ago and was placed on BIPAP. ENT was called to see pt fo hypophonia. Pt was seen by S/S and placed on a dysphagia diet. No modifying factors.         PAST MEDICAL & SURGICAL HISTORY:  MI (myocardial infarction)  HTN (hypertension)  HLD (hyperlipidemia)  DM (diabetes mellitus): A1c 7.1  CAD (coronary artery disease)  S/P primary angioplasty with coronary stent  S/P CABG x 4: 1997 by Dr. Smith    Allergies    No Known Allergies    Intolerances      MEDICATIONS  (STANDING):  allopurinol 300 milliGRAM(s) Oral daily  amiodarone    Tablet 200 milliGRAM(s) Oral daily  ammonium lactate 12% Lotion 1 Application(s) Topical two times a day  aspirin enteric coated 81 milliGRAM(s) Oral daily  atorvastatin 40 milliGRAM(s) Oral at bedtime  chlorhexidine 4% Liquid 1 Application(s) Topical <User Schedule>  cholecalciferol 2000 Unit(s) Oral daily  clopidogrel Tablet 75 milliGRAM(s) Oral daily  dextrose 5%. 1000 milliLiter(s) (50 mL/Hr) IV Continuous <Continuous>  dextrose 50% Injectable 12.5 Gram(s) IV Push once  dextrose 50% Injectable 25 Gram(s) IV Push once  dextrose 50% Injectable 25 Gram(s) IV Push once  doxazosin 2 milliGRAM(s) Oral at bedtime  furosemide    Tablet 40 milliGRAM(s) Oral daily  heparin  Injectable 5000 Unit(s) SubCutaneous every 8 hours  insulin lispro (HumaLOG) corrective regimen sliding scale   SubCutaneous three times a day before meals  insulin lispro (HumaLOG) corrective regimen sliding scale   SubCutaneous at bedtime  insulin lispro Injectable (HumaLOG) 2 Unit(s) SubCutaneous three times a day before meals  losartan 50 milliGRAM(s) Oral daily  metoprolol succinate ER 50 milliGRAM(s) Oral daily  nystatin    Suspension 632348 Unit(s) Oral four times a day  pantoprazole    Tablet 40 milliGRAM(s) Oral before breakfast    MEDICATIONS  (PRN):  acetaminophen   Tablet .. 650 milliGRAM(s) Oral every 6 hours PRN Mild Pain (1 - 3), Moderate Pain (4 - 6)  dextrose 40% Gel 15 Gram(s) Oral once PRN Blood Glucose LESS THAN 70 milliGRAM(s)/deciliter  glucagon  Injectable 1 milliGRAM(s) IntraMuscular once PRN Glucose LESS THAN 70 milligrams/deciliter      Social History:     Family history:     ROS:   ENT: all negative except as noted in HPI   CV: denies palpitations  Pulm: denies SOB, cough, hemoptysis  GI: denies change in apetite, indigestion, n/v  : denies pertinent urinary symptoms, urgency  Neuro: denies numbness/tingling, loss of sensation  Psych: denies anxiety  MS: denies muscle weakness, instability  Heme: denies easy bruising or bleeding  Endo: denies heat/cold intolerance, excessive sweating  Vascular: denies LE edema    Vital Signs Last 24 Hrs  T(C): 36.4 (23 Jan 2019 16:33), Max: 36.4 (23 Jan 2019 04:10)  T(F): 97.5 (23 Jan 2019 16:33), Max: 97.6 (23 Jan 2019 04:10)  HR: 80 (23 Jan 2019 16:33) (75 - 80)  BP: 124/68 (23 Jan 2019 16:33) (101/61 - 124/68)  BP(mean): --  RR: 16 (23 Jan 2019 16:33) (16 - 18)  SpO2: 98% (23 Jan 2019 16:33) (94% - 98%)                          11.5   4.4   )-----------( 81       ( 23 Jan 2019 06:27 )             34.7    01-23    138  |  99  |  26<H>  ----------------------------<  182<H>  3.4<L>   |  27  |  0.93    Ca    8.9      23 Jan 2019 06:27  Phos  3.2     01-22  Mg     1.8     01-22         PHYSICAL EXAM:  Gen: NAD  Skin: No rashes, bruises, or lesions  Head: Normocephalic, Atraumatic  Face: no edema, erythema, or fluctuance. Parotid glands soft without mass  Eyes: no scleral injection  Nose: Nares bilaterally patent, no discharge  Mouth: No Stridor / Drooling / Trismus.  Mucosa moist, tongue/uvula midline, oropharynx clear  Neck: Flat, supple, no lymphadenopathy, trachea midline, no masses  Lymphatic: No lymphadenopathy  Resp: breathing easily, no stridor  CV: no peripheral edema/cyanosis  GI: nondistended   Peripheral vascular: no JVD or edema  Neuro: facial nerve intact, no facial droop            Fiberoptic Indirect laryngoscopy:  (Scope #2 used)  Reason for Laryngoscopy:    Patient was unable to cooperate with mirror.  Right vocal fold granuloma and possible left vc sulcus, Nasopharynx, oropharynx, and hypopharynx clear, no bleeding. Tongue base, posterior pharyngeal wall, vallecula, epiglottis, and subglottis appear normal. No erythema, edema, pooling of secretions, masses or lesions. Airway patent, no foreign body visualized. No glottic/supraglottic edema. True vocal cords, arytenoids, vestibular folds, ventricles, pyriform sinuses, and aryepiglottic folds appear normal bilaterally. Vocal cords mobile with good contact b/l. CC: Hypophonia    HPI: 79 y/o male with PMHx of CAD with s/p CABG 4v in 1997, multiple stents, HTN, PUD, Gout, DM2 presented to [facility] on Dec 31st after unwitnessed fall. According to family, patient lives alone and they believe they found him on the floor on the second day. Patient got admitted to [facility] on telemetry floor to r/o mechanical vs Arrhythmogenic syncope. While admitted on the floor patient had elevated troponin  followed by V. Fib arrest with full code, returned to rhythm after 20 minutes of CPR,  intubated and moved to ICU. Pt sustained an anoxic brain injury. Pt self extubated about 2 weeks ago and was placed on BIPAP. ENT was called to see pt fo hypophonia for past 2 weeks. Pt was seen by S/S and placed on a dysphagia diet. No modifying factors. has been stable.        PAST MEDICAL & SURGICAL HISTORY:  MI (myocardial infarction)  HTN (hypertension)  HLD (hyperlipidemia)  DM (diabetes mellitus): A1c 7.1  CAD (coronary artery disease)  S/P primary angioplasty with coronary stent  S/P CABG x 4: 1997 by Dr. Smith    Allergies    No Known Allergies    Intolerances      MEDICATIONS  (STANDING):  allopurinol 300 milliGRAM(s) Oral daily  amiodarone    Tablet 200 milliGRAM(s) Oral daily  ammonium lactate 12% Lotion 1 Application(s) Topical two times a day  aspirin enteric coated 81 milliGRAM(s) Oral daily  atorvastatin 40 milliGRAM(s) Oral at bedtime  chlorhexidine 4% Liquid 1 Application(s) Topical <User Schedule>  cholecalciferol 2000 Unit(s) Oral daily  clopidogrel Tablet 75 milliGRAM(s) Oral daily  dextrose 5%. 1000 milliLiter(s) (50 mL/Hr) IV Continuous <Continuous>  dextrose 50% Injectable 12.5 Gram(s) IV Push once  dextrose 50% Injectable 25 Gram(s) IV Push once  dextrose 50% Injectable 25 Gram(s) IV Push once  doxazosin 2 milliGRAM(s) Oral at bedtime  furosemide    Tablet 40 milliGRAM(s) Oral daily  heparin  Injectable 5000 Unit(s) SubCutaneous every 8 hours  insulin lispro (HumaLOG) corrective regimen sliding scale   SubCutaneous three times a day before meals  insulin lispro (HumaLOG) corrective regimen sliding scale   SubCutaneous at bedtime  insulin lispro Injectable (HumaLOG) 2 Unit(s) SubCutaneous three times a day before meals  losartan 50 milliGRAM(s) Oral daily  metoprolol succinate ER 50 milliGRAM(s) Oral daily  nystatin    Suspension 436228 Unit(s) Oral four times a day  pantoprazole    Tablet 40 milliGRAM(s) Oral before breakfast    MEDICATIONS  (PRN):  acetaminophen   Tablet .. 650 milliGRAM(s) Oral every 6 hours PRN Mild Pain (1 - 3), Moderate Pain (4 - 6)  dextrose 40% Gel 15 Gram(s) Oral once PRN Blood Glucose LESS THAN 70 milliGRAM(s)/deciliter  glucagon  Injectable 1 milliGRAM(s) IntraMuscular once PRN Glucose LESS THAN 70 milligrams/deciliter      Social History:     Family history:     ROS:   ENT: all negative except as noted in HPI   CV: denies palpitations  Pulm: denies SOB, cough, hemoptysis  GI: denies change in apetite, indigestion, n/v  : denies pertinent urinary symptoms, urgency  Neuro: denies numbness/tingling, loss of sensation  Psych: denies anxiety  MS: denies muscle weakness, instability  Heme: denies easy bruising or bleeding  Endo: denies heat/cold intolerance, excessive sweating  Vascular: denies LE edema    Vital Signs Last 24 Hrs  T(C): 36.4 (23 Jan 2019 16:33), Max: 36.4 (23 Jan 2019 04:10)  T(F): 97.5 (23 Jan 2019 16:33), Max: 97.6 (23 Jan 2019 04:10)  HR: 80 (23 Jan 2019 16:33) (75 - 80)  BP: 124/68 (23 Jan 2019 16:33) (101/61 - 124/68)  BP(mean): --  RR: 16 (23 Jan 2019 16:33) (16 - 18)  SpO2: 98% (23 Jan 2019 16:33) (94% - 98%)                          11.5   4.4   )-----------( 81       ( 23 Jan 2019 06:27 )             34.7    01-23    138  |  99  |  26<H>  ----------------------------<  182<H>  3.4<L>   |  27  |  0.93    Ca    8.9      23 Jan 2019 06:27  Phos  3.2     01-22  Mg     1.8     01-22         PHYSICAL EXAM:  Gen: NAD  Skin: No rashes, bruises, or lesions  Head: Normocephalic, Atraumatic  Face: no edema, erythema, or fluctuance. Parotid glands soft without mass  Eyes: no scleral injection  Nose: Nares bilaterally patent, no discharge  Mouth: No Stridor / Drooling / Trismus.  Mucosa moist, tongue/uvula midline, oropharynx clear  Neck: Flat, supple, no lymphadenopathy, trachea midline, no masses  Lymphatic: No lymphadenopathy  Resp: breathing easily, no stridor  CV: no peripheral edema/cyanosis  GI: nondistended   Peripheral vascular: no JVD or edema  Neuro: facial nerve intact, no facial droop            Fiberoptic Indirect laryngoscopy:  (Scope #2 used)  Reason for Laryngoscopy:    Patient was unable to cooperate with mirror.  Nasopharynx, oropharynx, and hypopharynx clear, no bleeding. Tongue base, posterior pharyngeal wall, vallecula, epiglottis, and subglottis appear normal. No erythema, edema, pooling of secretions, masses or lesions. Airway patent, no foreign body visualized. No glottic/supraglottic edema. True vocal cords with Right vocal fold granuloma and possible left vc sulcus.  arytenoids, vestibular folds, ventricles, pyriform sinuses, and aryepiglottic folds appear normal bilaterally. Vocal cords mobile with good contact b/l.

## 2019-01-23 NOTE — SWALLOW VFSS/MBS ASSESSMENT ADULT - LARYNGEAL PENETRATION DURING THE SWALLOW - SILENT
Trace/over laryngeal surface of arytenoids, with incomplete retrieval deep, over laryngeal surface of arytenoids, to level of vocal cords, without retrieval/Trace/Mild

## 2019-01-23 NOTE — CONSULT NOTE ADULT - PROBLEM SELECTOR RECOMMENDATION 9
Anti reflux meds  No ENT intervention needed at this time Anti reflux meds  reflux precautions  needs to follow up on D/c for repeat exam to assure resolution

## 2019-01-23 NOTE — SWALLOW VFSS/MBS ASSESSMENT ADULT - DIAGNOSTIC IMPRESSIONS
Pt presents with evidence of oropharyngeal dysphagia notable for impaired oral management, laryngeal penetration of nectar-thick liquids, and aspiration of thin liquids. Pt not a candidate for postural swallow strategies due to cognitive deficits. Pt presents with evidence of oropharyngeal dysphagia notable for impaired oral management, laryngeal penetration of nectar-thick liquids, and aspiration of thin liquids. Pt not a candidate for postural swallow strategies due to cognitive deficits.    Disorders: reduced lingual strength/ROM/Rate of motion, reduced tongue to palate contact, mildly reduced BOT to posterior pharyngeal wall contact, delay in trigger of the swallow reflex, reduced hyo-laryngeal excursion, reduced laryngeal closure, reduced supraglottic sensation, reduced subglottic sensation.

## 2019-01-23 NOTE — SWALLOW VFSS/MBS ASSESSMENT ADULT - ROSENBEK'S PENETRATION ASPIRATION SCALE
(1) no aspiration, contrast does not enter airway (3) contrast remains above the vocal cords, visible residue remains (penetration)/3 with straw drinking; 1 for cup drinking (5) contrast contacts vocal cords, visible residue remains (penetration)

## 2019-01-23 NOTE — SWALLOW VFSS/MBS ASSESSMENT ADULT - SLP PERTINENT HISTORY OF CURRENT PROBLEM
78M with CAD with s/p CABG 4v in 1997, multiple stents, HTN, PUD, Gout, DM2 presented to [facility] on Dec 31st after unwitnessed fall 2/2 VT arrest, returned to rhythm after 20 minutes of CPR, intubated and moved to ICU. Self-extubated 1/9. Pt's [facility] course was c/b aspiration pna, pulmonary edema. He was found to have EF of 30-35% and was transferred to [facility] for cath. In CCU weaned off BiPAP to NC 1/11. Cath demonstrated 90 and 95% occlusion of l circ and rpda, respectively however unable to intervene on lesions. Pt is now pending EP eval for AICD and requiring IV lasix for diuresis as well as BiPAP overnight. Pt's daughter states his mental status changed since he fell. Pt reported falling on a Friday and was found on a Sunday. He has been confused since.

## 2019-01-23 NOTE — SWALLOW VFSS/MBS ASSESSMENT ADULT - SLP GENERAL OBSERVATIONS
Pt encountered in radiology, secure in GAY chair. Pt Pt encountered in radiology, secure in GAY chair. Pt awake and alert, cooperative, following directions Pt encountered in radiology, secure in GAY chair. Pt awake and alert, cooperative, following directions for purposes of exam. Pt encountered in radiology, secure in GAY chair. Pt awake and alert, cooperative, inconsistently following directions for purposes of exam.

## 2019-01-23 NOTE — PROGRESS NOTE ADULT - PROBLEM SELECTOR PLAN 9
DVT PPx SQH  Aspiration precaution- speech/swallow eval   Fall precaution  PT Eval recommending [facility]. Daughter also with interest in LTC facility, will require  further discussion with case management pending clinical course.

## 2019-01-23 NOTE — CONSULT NOTE ADULT - ATTENDING COMMENTS
Pt seen and examined with team at time of service, I was physically present for the key portions for evaluation and management (E/M) service provided, and preformed key portions of the procedure. Agree with above. Plan discussed with primary team.

## 2019-01-23 NOTE — PROGRESS NOTE ADULT - PROBLEM SELECTOR PLAN 5
-Stable Hb at 11 with MCV> 100  - B12/Folate- ok    NOTED THROMBOCYTOPENIA- unclear cause. if continues to drop will need to rule out HIT. mild improvement today. hold dvt ppx if plts drop <50,000 -Stable Hb at 11 with MCV> 100  - B12/Folate- ok    HROMBOCYTOPENIA- unclear cause. improving. hold dvt ppx if plts drop <50,000

## 2019-01-23 NOTE — SWALLOW VFSS/MBS ASSESSMENT ADULT - RECOMMENDED FEEDING/EATING TECHNIQUES
oral hygiene/position upright (90 degrees)/no straws/maintain upright posture during/after eating for 30 mins/small sips/bites

## 2019-01-23 NOTE — PROGRESS NOTE ADULT - SUBJECTIVE AND OBJECTIVE BOX
Patient is a 78y old  Male who presents with a chief complaint of arrest/nstemi (22 Jan 2019 19:18)      SUBJECTIVE / OVERNIGHT EVENTS:    no overnight events. difficult to obtain ROS from pt due to significant difficulties in speaking/hypophonia. pt without signs of distress or discomfort.   family not currently at bedside.    MEDICATIONS  (STANDING):  allopurinol 300 milliGRAM(s) Oral daily  amiodarone    Tablet 200 milliGRAM(s) Oral daily  ammonium lactate 12% Lotion 1 Application(s) Topical two times a day  aspirin enteric coated 81 milliGRAM(s) Oral daily  atorvastatin 40 milliGRAM(s) Oral at bedtime  chlorhexidine 4% Liquid 1 Application(s) Topical <User Schedule>  cholecalciferol 2000 Unit(s) Oral daily  clopidogrel Tablet 75 milliGRAM(s) Oral daily  dextrose 5%. 1000 milliLiter(s) (50 mL/Hr) IV Continuous <Continuous>  dextrose 50% Injectable 12.5 Gram(s) IV Push once  dextrose 50% Injectable 25 Gram(s) IV Push once  dextrose 50% Injectable 25 Gram(s) IV Push once  doxazosin 2 milliGRAM(s) Oral at bedtime  furosemide    Tablet 40 milliGRAM(s) Oral daily  heparin  Injectable 5000 Unit(s) SubCutaneous every 8 hours  insulin lispro (HumaLOG) corrective regimen sliding scale   SubCutaneous three times a day before meals  insulin lispro (HumaLOG) corrective regimen sliding scale   SubCutaneous at bedtime  insulin lispro Injectable (HumaLOG) 2 Unit(s) SubCutaneous three times a day before meals  losartan 50 milliGRAM(s) Oral daily  metoprolol succinate ER 50 milliGRAM(s) Oral daily  nystatin    Suspension 608103 Unit(s) Oral four times a day  pantoprazole    Tablet 40 milliGRAM(s) Oral before breakfast    MEDICATIONS  (PRN):  acetaminophen   Tablet .. 650 milliGRAM(s) Oral every 6 hours PRN Mild Pain (1 - 3), Moderate Pain (4 - 6)  dextrose 40% Gel 15 Gram(s) Oral once PRN Blood Glucose LESS THAN 70 milliGRAM(s)/deciliter  glucagon  Injectable 1 milliGRAM(s) IntraMuscular once PRN Glucose LESS THAN 70 milligrams/deciliter        CAPILLARY BLOOD GLUCOSE      POCT Blood Glucose.: 268 mg/dL (23 Jan 2019 12:53)  POCT Blood Glucose.: 184 mg/dL (23 Jan 2019 08:51)  POCT Blood Glucose.: 158 mg/dL (22 Jan 2019 21:49)  POCT Blood Glucose.: 179 mg/dL (22 Jan 2019 17:50)    I&O's Summary    22 Jan 2019 07:01  -  23 Jan 2019 07:00  --------------------------------------------------------  IN: 600 mL / OUT: 0 mL / NET: 600 mL      tele sinus 70-80  T 97.6, P 79, /70, R 18, O2 94% RA  PHYSICAL EXAM:  GENERAL: NAD, well-developed  HEAD:  Atraumatic, Normocephalic  EYES: EOMI, PERRLA, conjunctiva and sclera clear  CHEST/LUNG: Clear to auscultation bilaterally; No wheeze  HEART: Regular rate and rhythm; 2/6 SAE; No rubs or gallops  ABDOMEN: Soft, Nontender, Nondistended; Bowel sounds present  EXTREMITIES:  LE's: 2+ Peripheral Pulses, No clubbing, cyanosis, or edema LUE: 1+ pitting edema    LABS:                        11.5   4.4   )-----------( 81       ( 23 Jan 2019 06:27 )             34.7     01-23    138  |  99  |  26<H>  ----------------------------<  182<H>  3.4<L>   |  27  |  0.93    Ca    8.9      23 Jan 2019 06:27  Phos  3.2     01-22  Mg     1.8     01-22                RADIOLOGY & ADDITIONAL TESTS:    Imaging Personally Reviewed:    Consultant(s) Notes Reviewed:  speech/swallow    Care Discussed with Consultants/Other Providers: medicine PA

## 2019-01-23 NOTE — SWALLOW VFSS/MBS ASSESSMENT ADULT - ADDITIONAL INFORMATION
+ calcification of laryngeal surface of epiglottis  + lateral neck calcification, ? vascular  + multi-level C-spine changes with multiple small non-obstructive anterior cervical osteophytes

## 2019-01-23 NOTE — PROGRESS NOTE ADULT - ASSESSMENT
79yo male with hx of CAD with s/p CABG 4v in 1997, multiple stents, HTN, PUD, Gout, DM2, and Acute HFrEF (EF 30-35%), presented to [facility] from [facility] for NSTEMI management after stabilization from complications of V. Fib arrest s/p ROSC after 20mins of CPR and ICU stay complicated by sepsis secondary to Aspiration PNA. Pt s/p Cardiac cath at [facility] revealing multiple vessel disease without any intervention, recommending medical optimization and stabilization prior to PCI. 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     MRI/MRA  EXAM:  MR ANGIO BRAIN                        EXAM:  MR ANGIO NECK                        EXAM:  MR BRAIN                          PROCEDURE DATE:  01/17/2019      INTERPRETATION:  History: Left-sided weakness. V. fib arrest for 20   minutes with CPR..  Description: A noncontrast brain MRI, 3-D time-of-flight brain MRA, 3D   TOF neck MRA, and 2D time of flight neck MRA were performed.  Comparison: Head CT 01/12/2019..    Brain MRI:  Sagittal T1, axial T1, T2, FLAIR, SWI, GRE, and diffusion-weighted series   with ADC maps were performed.  There is no evidence for acute infarct, acute hemorrhage, mass effect, or   hydrocephalus.  A punctate focus of decreased SWI signal involves the right temporal lobe   without surrounding edema which may reflect an old microbleed,   calcification, or a tiny cavernous malformation.  Moderate patchy foci of increased T2 and FLAIR signal are present in the   periventricular and subcortical white matter which most likely represent   chronic microvascular ischemic changes given the patient's age.   Cerebral volume loss is present.    Brain MRA:  There is no evidence for focal stenosis, major vessel occlusion, or   aneurysm. Tiny aneurysms could be beyond the resolution of MRA technique.    Neck MRA:  No significant carotid or vertebral artery stenosis is noted in the neck   by NASCET criteria.    IMPRESSION:  1. On the brain MRI, there is no evidence for acute infarct or acute   hemorrhage. Moderate chronic white matter changes are present.  2. No evidence for significant intracranial or extracranial stenosis on   the MRA studies.    FREDERIC NASH M.D., ATTENDING RADIOLOGIST  This document has been electronically signed. Jan 18 2019  8:52AM

## 2019-01-23 NOTE — PROGRESS NOTE ADULT - PROBLEM SELECTOR PLAN 1
-pt with increasing word finding abilities, visual impairments, L sided weakness   -likely cause of acute encephalopathy with possible other contributing factors (ICU delirium etc). Low suspicion for infection  -cont q4h neurochecks  -neuro consult appreciated  -out of bed to chair, frequent reorientation  -appreciate neuro eval- MR head, MRA head and neck unremarkable  -appreciate ophthalmology eval- no acute intervention, outpatient f/u  -EEG with diffuse cerebral dysfunction  -PT/OT, speech/swallow f/u- recommending dysphagia 1 diet, MBS and consider ENT c/s as pt with hypophonia, possible laryngeal pathology. will c/s ENT today  -eventual rehab when medically stable

## 2019-01-23 NOTE — PROGRESS NOTE ADULT - PROBLEM SELECTOR PLAN 3
-Saturating well on RA  -TTE from outside hospital with EF 30-35%, repeat here improved with EF 65% consistent with HFpEF  -lasix 40mg daily PO  -Daily weights and Strict I/Os  -replete lytes  -per EP no AICD at this time, first medical management. awaiting possible PCI   -Continue to monitor respiratory status

## 2019-01-24 LAB
ANION GAP SERPL CALC-SCNC: 13 MMOL/L — SIGNIFICANT CHANGE UP (ref 5–17)
BUN SERPL-MCNC: 23 MG/DL — SIGNIFICANT CHANGE UP (ref 7–23)
CALCIUM SERPL-MCNC: 9.1 MG/DL — SIGNIFICANT CHANGE UP (ref 8.4–10.5)
CHLORIDE SERPL-SCNC: 100 MMOL/L — SIGNIFICANT CHANGE UP (ref 96–108)
CO2 SERPL-SCNC: 26 MMOL/L — SIGNIFICANT CHANGE UP (ref 22–31)
CREAT SERPL-MCNC: 0.88 MG/DL — SIGNIFICANT CHANGE UP (ref 0.5–1.3)
GLUCOSE BLDC GLUCOMTR-MCNC: 142 MG/DL — HIGH (ref 70–99)
GLUCOSE BLDC GLUCOMTR-MCNC: 194 MG/DL — HIGH (ref 70–99)
GLUCOSE BLDC GLUCOMTR-MCNC: 195 MG/DL — HIGH (ref 70–99)
GLUCOSE BLDC GLUCOMTR-MCNC: 199 MG/DL — HIGH (ref 70–99)
GLUCOSE SERPL-MCNC: 163 MG/DL — HIGH (ref 70–99)
HCT VFR BLD CALC: 34.5 % — LOW (ref 39–50)
HGB BLD-MCNC: 11.6 G/DL — LOW (ref 13–17)
MAGNESIUM SERPL-MCNC: 1.8 MG/DL — SIGNIFICANT CHANGE UP (ref 1.6–2.6)
MCHC RBC-ENTMCNC: 33.6 GM/DL — SIGNIFICANT CHANGE UP (ref 32–36)
MCHC RBC-ENTMCNC: 33.6 PG — SIGNIFICANT CHANGE UP (ref 27–34)
MCV RBC AUTO: 100 FL — SIGNIFICANT CHANGE UP (ref 80–100)
PLATELET # BLD AUTO: 82 K/UL — LOW (ref 150–400)
POTASSIUM SERPL-MCNC: 3.6 MMOL/L — SIGNIFICANT CHANGE UP (ref 3.5–5.3)
POTASSIUM SERPL-SCNC: 3.6 MMOL/L — SIGNIFICANT CHANGE UP (ref 3.5–5.3)
RBC # BLD: 3.45 M/UL — LOW (ref 4.2–5.8)
RBC # FLD: 13.8 % — SIGNIFICANT CHANGE UP (ref 10.3–14.5)
SODIUM SERPL-SCNC: 139 MMOL/L — SIGNIFICANT CHANGE UP (ref 135–145)
WBC # BLD: 4.1 K/UL — SIGNIFICANT CHANGE UP (ref 3.8–10.5)
WBC # FLD AUTO: 4.1 K/UL — SIGNIFICANT CHANGE UP (ref 3.8–10.5)

## 2019-01-24 PROCEDURE — 99233 SBSQ HOSP IP/OBS HIGH 50: CPT

## 2019-01-24 RX ORDER — POTASSIUM CHLORIDE 20 MEQ
40 PACKET (EA) ORAL ONCE
Qty: 0 | Refills: 0 | Status: COMPLETED | OUTPATIENT
Start: 2019-01-24 | End: 2019-01-24

## 2019-01-24 RX ADMIN — HEPARIN SODIUM 5000 UNIT(S): 5000 INJECTION INTRAVENOUS; SUBCUTANEOUS at 12:35

## 2019-01-24 RX ADMIN — ATORVASTATIN CALCIUM 40 MILLIGRAM(S): 80 TABLET, FILM COATED ORAL at 21:36

## 2019-01-24 RX ADMIN — Medication 2: at 13:03

## 2019-01-24 RX ADMIN — Medication 40 MILLIGRAM(S): at 05:05

## 2019-01-24 RX ADMIN — Medication 40 MILLIEQUIVALENT(S): at 17:55

## 2019-01-24 RX ADMIN — Medication 1 APPLICATION(S): at 17:57

## 2019-01-24 RX ADMIN — Medication 50 MILLIGRAM(S): at 05:05

## 2019-01-24 RX ADMIN — Medication 500000 UNIT(S): at 17:59

## 2019-01-24 RX ADMIN — Medication 2: at 09:03

## 2019-01-24 RX ADMIN — PANTOPRAZOLE SODIUM 40 MILLIGRAM(S): 20 TABLET, DELAYED RELEASE ORAL at 05:05

## 2019-01-24 RX ADMIN — Medication 2 MILLIGRAM(S): at 21:37

## 2019-01-24 RX ADMIN — LOSARTAN POTASSIUM 50 MILLIGRAM(S): 100 TABLET, FILM COATED ORAL at 05:05

## 2019-01-24 RX ADMIN — AMIODARONE HYDROCHLORIDE 200 MILLIGRAM(S): 400 TABLET ORAL at 05:05

## 2019-01-24 RX ADMIN — Medication 2 UNIT(S): at 09:04

## 2019-01-24 RX ADMIN — Medication 300 MILLIGRAM(S): at 12:35

## 2019-01-24 RX ADMIN — Medication 2000 UNIT(S): at 12:35

## 2019-01-24 RX ADMIN — Medication 500000 UNIT(S): at 05:05

## 2019-01-24 RX ADMIN — HEPARIN SODIUM 5000 UNIT(S): 5000 INJECTION INTRAVENOUS; SUBCUTANEOUS at 05:06

## 2019-01-24 RX ADMIN — Medication 1 APPLICATION(S): at 05:06

## 2019-01-24 RX ADMIN — Medication 500000 UNIT(S): at 12:35

## 2019-01-24 RX ADMIN — CLOPIDOGREL BISULFATE 75 MILLIGRAM(S): 75 TABLET, FILM COATED ORAL at 12:35

## 2019-01-24 RX ADMIN — Medication 500000 UNIT(S): at 00:04

## 2019-01-24 RX ADMIN — Medication 2 UNIT(S): at 13:03

## 2019-01-24 RX ADMIN — Medication 81 MILLIGRAM(S): at 12:35

## 2019-01-24 RX ADMIN — HEPARIN SODIUM 5000 UNIT(S): 5000 INJECTION INTRAVENOUS; SUBCUTANEOUS at 21:37

## 2019-01-24 RX ADMIN — Medication 2 UNIT(S): at 17:59

## 2019-01-24 NOTE — PROGRESS NOTE ADULT - ASSESSMENT
77yo male with hx of CAD with s/p CABG 4v in 1997, multiple stents, HTN, PUD, Gout, DM2, and Acute HFrEF (EF 30-35%), presented to [facility] from [facility] for NSTEMI management after stabilization from complications of V. Fib arrest s/p ROSC after 20mins of CPR and ICU stay complicated by sepsis secondary to Aspiration PNA. Pt s/p Cardiac cath at [facility] revealing multiple vessel disease without any intervention, recommending medical optimization and stabilization prior to PCI. 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     MRI/MRA  EXAM:  MR ANGIO BRAIN                        EXAM:  MR ANGIO NECK                        EXAM:  MR BRAIN                          PROCEDURE DATE:  01/17/2019      INTERPRETATION:  History: Left-sided weakness. V. fib arrest for 20   minutes with CPR..  Description: A noncontrast brain MRI, 3-D time-of-flight brain MRA, 3D   TOF neck MRA, and 2D time of flight neck MRA were performed.  Comparison: Head CT 01/12/2019..    Brain MRI:  Sagittal T1, axial T1, T2, FLAIR, SWI, GRE, and diffusion-weighted series   with ADC maps were performed.  There is no evidence for acute infarct, acute hemorrhage, mass effect, or   hydrocephalus.  A punctate focus of decreased SWI signal involves the right temporal lobe   without surrounding edema which may reflect an old microbleed,   calcification, or a tiny cavernous malformation.  Moderate patchy foci of increased T2 and FLAIR signal are present in the   periventricular and subcortical white matter which most likely represent   chronic microvascular ischemic changes given the patient's age.   Cerebral volume loss is present.    Brain MRA:  There is no evidence for focal stenosis, major vessel occlusion, or   aneurysm. Tiny aneurysms could be beyond the resolution of MRA technique.    Neck MRA:  No significant carotid or vertebral artery stenosis is noted in the neck   by NASCET criteria.    IMPRESSION:  1. On the brain MRI, there is no evidence for acute infarct or acute   hemorrhage. Moderate chronic white matter changes are present.  2. No evidence for significant intracranial or extracranial stenosis on   the MRA studies.    FREDERIC NASH M.D., ATTENDING RADIOLOGIST  This document has been electronically signed. Jan 18 2019  8:52AM

## 2019-01-24 NOTE — PROGRESS NOTE ADULT - SUBJECTIVE AND OBJECTIVE BOX
Patient is a 78y old  Male who presents with a chief complaint of Vfib arrest (23 Jan 2019 16:39)      SUBJECTIVE / OVERNIGHT EVENTS:    pt sitting up in chair, tolerating dysphagia I diet without problems. no active complaints. pt able to carry out commands, express understanding. difficulting speaking / hypophonia remains.       MEDICATIONS  (STANDING):  allopurinol 300 milliGRAM(s) Oral daily  amiodarone    Tablet 200 milliGRAM(s) Oral daily  ammonium lactate 12% Lotion 1 Application(s) Topical two times a day  aspirin enteric coated 81 milliGRAM(s) Oral daily  atorvastatin 40 milliGRAM(s) Oral at bedtime  chlorhexidine 4% Liquid 1 Application(s) Topical <User Schedule>  cholecalciferol 2000 Unit(s) Oral daily  clopidogrel Tablet 75 milliGRAM(s) Oral daily  dextrose 5%. 1000 milliLiter(s) (50 mL/Hr) IV Continuous <Continuous>  dextrose 50% Injectable 12.5 Gram(s) IV Push once  dextrose 50% Injectable 25 Gram(s) IV Push once  dextrose 50% Injectable 25 Gram(s) IV Push once  doxazosin 2 milliGRAM(s) Oral at bedtime  furosemide    Tablet 40 milliGRAM(s) Oral daily  heparin  Injectable 5000 Unit(s) SubCutaneous every 8 hours  insulin lispro (HumaLOG) corrective regimen sliding scale   SubCutaneous three times a day before meals  insulin lispro (HumaLOG) corrective regimen sliding scale   SubCutaneous at bedtime  insulin lispro Injectable (HumaLOG) 2 Unit(s) SubCutaneous three times a day before meals  losartan 50 milliGRAM(s) Oral daily  metoprolol succinate ER 50 milliGRAM(s) Oral daily  nystatin    Suspension 966733 Unit(s) Oral four times a day  pantoprazole    Tablet 40 milliGRAM(s) Oral before breakfast    MEDICATIONS  (PRN):  acetaminophen   Tablet .. 650 milliGRAM(s) Oral every 6 hours PRN Mild Pain (1 - 3), Moderate Pain (4 - 6)  dextrose 40% Gel 15 Gram(s) Oral once PRN Blood Glucose LESS THAN 70 milliGRAM(s)/deciliter  glucagon  Injectable 1 milliGRAM(s) IntraMuscular once PRN Glucose LESS THAN 70 milligrams/deciliter        CAPILLARY BLOOD GLUCOSE      POCT Blood Glucose.: 199 mg/dL (24 Jan 2019 12:52)  POCT Blood Glucose.: 195 mg/dL (24 Jan 2019 08:45)  POCT Blood Glucose.: 208 mg/dL (23 Jan 2019 22:03)  POCT Blood Glucose.: 241 mg/dL (23 Jan 2019 17:58)    I&O's Summary    23 Jan 2019 07:01  -  24 Jan 2019 07:00  --------------------------------------------------------  IN: 240 mL / OUT: 250 mL / NET: -10 mL    24 Jan 2019 07:01  -  24 Jan 2019 16:19  --------------------------------------------------------  IN: 360 mL / OUT: 0 mL / NET: 360 mL      tele: SR, 1st degree 70-80s, PACs.   T 98, P 89, /68, R 18, O2 98% RA  PHYSICAL EXAM:  GENERAL: NAD, well-developed  HEAD:  Atraumatic, Normocephalic  EYES: EOMI, PERRLA, conjunctiva and sclera clear  CHEST/LUNG: Clear to auscultation bilaterally; No wheeze  HEART: Regular rate and rhythm; 2/6 SAE; No rubs or gallops  ABDOMEN: Soft, Nontender, Nondistended; Bowel sounds present  EXTREMITIES:  LE's: 2+ Peripheral Pulses, No clubbing, cyanosis, or edema LUE: 1+ pitting edema    LABS:                        11.6   4.1   )-----------( 82       ( 24 Jan 2019 06:37 )             34.5     01-24    139  |  100  |  23  ----------------------------<  163<H>  3.6   |  26  |  0.88    Ca    9.1      24 Jan 2019 06:37  Mg     1.8     01-24                RADIOLOGY & ADDITIONAL TESTS:    Imaging Personally Reviewed:    Consultant(s) Notes Reviewed:  ENT    Care Discussed with Consultants/Other Providers: cardiology Dr. Morgan

## 2019-01-24 NOTE — PROGRESS NOTE ADULT - PROBLEM SELECTOR PLAN 3
-Saturating well on RA  -TTE from outside hospital with EF 30-35%, repeat here improved with EF 65% consistent with HFpEF  -lasix 40mg daily PO  -Daily weights and Strict I/Os  -replete lytes  -per EP no AICD at this time, first medical management. awaiting possible PCI as above. if pt stented he will need medical therapy for 3 mo before consideration of AICD.  -Continue to monitor respiratory status

## 2019-01-24 NOTE — PROGRESS NOTE ADULT - PROBLEM SELECTOR PLAN 5
-Stable Hb at 11 with MCV> 100  - B12/Folate- ok    HROMBOCYTOPENIA- unclear cause. improving. hold dvt ppx if plts drop <50,000

## 2019-01-24 NOTE — PROGRESS NOTE ADULT - PROBLEM SELECTOR PLAN 9
DVT PPx SQH  Aspiration precaution- speech/swallow eval   Fall precaution  Planning for [facility] with likely transition to LTC. Pending cardiac optimization as above. Realistically hopeful for early next week for [email or web address]

## 2019-01-24 NOTE — PROGRESS NOTE ADULT - PROBLEM SELECTOR PLAN 8
-sacral and bilateral heel pressure wounds, improving  -Follow up with Wound care recommendations  s/p podiatry eval for nail clipping

## 2019-01-24 NOTE — PROGRESS NOTE ADULT - PROBLEM SELECTOR PLAN 2
-s/p  V. 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   -continue optimized medical management  -PCI prior to D/C- discussed with cards Dr. Morgan today, he agrees pt would benefit from cath and intervention and will discuss with Dr. Roberts.  -Continue ASA/Plavix/Metoprolol/Statin  -TTE with EF 65%, severe MR.

## 2019-01-25 LAB
ANION GAP SERPL CALC-SCNC: 12 MMOL/L — SIGNIFICANT CHANGE UP (ref 5–17)
BUN SERPL-MCNC: 23 MG/DL — SIGNIFICANT CHANGE UP (ref 7–23)
CALCIUM SERPL-MCNC: 9 MG/DL — SIGNIFICANT CHANGE UP (ref 8.4–10.5)
CHLORIDE SERPL-SCNC: 101 MMOL/L — SIGNIFICANT CHANGE UP (ref 96–108)
CO2 SERPL-SCNC: 26 MMOL/L — SIGNIFICANT CHANGE UP (ref 22–31)
CREAT SERPL-MCNC: 0.86 MG/DL — SIGNIFICANT CHANGE UP (ref 0.5–1.3)
GLUCOSE BLDC GLUCOMTR-MCNC: 156 MG/DL — HIGH (ref 70–99)
GLUCOSE BLDC GLUCOMTR-MCNC: 201 MG/DL — HIGH (ref 70–99)
GLUCOSE BLDC GLUCOMTR-MCNC: 205 MG/DL — HIGH (ref 70–99)
GLUCOSE BLDC GLUCOMTR-MCNC: 229 MG/DL — HIGH (ref 70–99)
GLUCOSE SERPL-MCNC: 175 MG/DL — HIGH (ref 70–99)
HCT VFR BLD CALC: 34.4 % — LOW (ref 39–50)
HGB BLD-MCNC: 11.7 G/DL — LOW (ref 13–17)
MAGNESIUM SERPL-MCNC: 1.8 MG/DL — SIGNIFICANT CHANGE UP (ref 1.6–2.6)
MCHC RBC-ENTMCNC: 33.7 PG — SIGNIFICANT CHANGE UP (ref 27–34)
MCHC RBC-ENTMCNC: 34 GM/DL — SIGNIFICANT CHANGE UP (ref 32–36)
MCV RBC AUTO: 99.1 FL — SIGNIFICANT CHANGE UP (ref 80–100)
PLATELET # BLD AUTO: 82 K/UL — LOW (ref 150–400)
POTASSIUM SERPL-MCNC: 3.9 MMOL/L — SIGNIFICANT CHANGE UP (ref 3.5–5.3)
POTASSIUM SERPL-SCNC: 3.9 MMOL/L — SIGNIFICANT CHANGE UP (ref 3.5–5.3)
RBC # BLD: 3.47 M/UL — LOW (ref 4.2–5.8)
RBC # FLD: 13.7 % — SIGNIFICANT CHANGE UP (ref 10.3–14.5)
SODIUM SERPL-SCNC: 139 MMOL/L — SIGNIFICANT CHANGE UP (ref 135–145)
WBC # BLD: 4.5 K/UL — SIGNIFICANT CHANGE UP (ref 3.8–10.5)
WBC # FLD AUTO: 4.5 K/UL — SIGNIFICANT CHANGE UP (ref 3.8–10.5)

## 2019-01-25 PROCEDURE — 99233 SBSQ HOSP IP/OBS HIGH 50: CPT

## 2019-01-25 RX ORDER — MAGNESIUM SULFATE 500 MG/ML
1 VIAL (ML) INJECTION ONCE
Qty: 0 | Refills: 0 | Status: COMPLETED | OUTPATIENT
Start: 2019-01-25 | End: 2019-01-25

## 2019-01-25 RX ORDER — POTASSIUM CHLORIDE 20 MEQ
40 PACKET (EA) ORAL ONCE
Qty: 0 | Refills: 0 | Status: COMPLETED | OUTPATIENT
Start: 2019-01-25 | End: 2019-01-25

## 2019-01-25 RX ORDER — SALIVA SUBSTITUTE COMB NO.11 351 MG
5 POWDER IN PACKET (EA) MUCOUS MEMBRANE
Qty: 0 | Refills: 0 | Status: DISCONTINUED | OUTPATIENT
Start: 2019-01-25 | End: 2019-01-29

## 2019-01-25 RX ORDER — PANTOPRAZOLE SODIUM 20 MG/1
40 TABLET, DELAYED RELEASE ORAL
Qty: 0 | Refills: 0 | Status: DISCONTINUED | OUTPATIENT
Start: 2019-01-25 | End: 2019-01-29

## 2019-01-25 RX ADMIN — CLOPIDOGREL BISULFATE 75 MILLIGRAM(S): 75 TABLET, FILM COATED ORAL at 13:15

## 2019-01-25 RX ADMIN — HEPARIN SODIUM 5000 UNIT(S): 5000 INJECTION INTRAVENOUS; SUBCUTANEOUS at 13:16

## 2019-01-25 RX ADMIN — Medication 1 APPLICATION(S): at 08:58

## 2019-01-25 RX ADMIN — Medication 4: at 09:00

## 2019-01-25 RX ADMIN — Medication 40 MILLIGRAM(S): at 06:30

## 2019-01-25 RX ADMIN — ATORVASTATIN CALCIUM 40 MILLIGRAM(S): 80 TABLET, FILM COATED ORAL at 21:15

## 2019-01-25 RX ADMIN — HEPARIN SODIUM 5000 UNIT(S): 5000 INJECTION INTRAVENOUS; SUBCUTANEOUS at 21:15

## 2019-01-25 RX ADMIN — PANTOPRAZOLE SODIUM 40 MILLIGRAM(S): 20 TABLET, DELAYED RELEASE ORAL at 09:00

## 2019-01-25 RX ADMIN — Medication 500000 UNIT(S): at 00:31

## 2019-01-25 RX ADMIN — Medication 5 MILLILITER(S): at 17:01

## 2019-01-25 RX ADMIN — Medication 2 UNIT(S): at 13:16

## 2019-01-25 RX ADMIN — Medication 2 UNIT(S): at 18:46

## 2019-01-25 RX ADMIN — Medication 2 MILLIGRAM(S): at 21:15

## 2019-01-25 RX ADMIN — Medication 5 MILLILITER(S): at 13:15

## 2019-01-25 RX ADMIN — Medication 500000 UNIT(S): at 06:30

## 2019-01-25 RX ADMIN — AMIODARONE HYDROCHLORIDE 200 MILLIGRAM(S): 400 TABLET ORAL at 06:29

## 2019-01-25 RX ADMIN — Medication 1 APPLICATION(S): at 21:15

## 2019-01-25 RX ADMIN — Medication 300 MILLIGRAM(S): at 13:16

## 2019-01-25 RX ADMIN — Medication 40 MILLIEQUIVALENT(S): at 17:00

## 2019-01-25 RX ADMIN — Medication 4: at 18:46

## 2019-01-25 RX ADMIN — Medication 2000 UNIT(S): at 13:15

## 2019-01-25 RX ADMIN — HEPARIN SODIUM 5000 UNIT(S): 5000 INJECTION INTRAVENOUS; SUBCUTANEOUS at 06:30

## 2019-01-25 RX ADMIN — Medication 4: at 13:16

## 2019-01-25 RX ADMIN — LOSARTAN POTASSIUM 50 MILLIGRAM(S): 100 TABLET, FILM COATED ORAL at 06:30

## 2019-01-25 RX ADMIN — Medication 100 GRAM(S): at 17:00

## 2019-01-25 RX ADMIN — Medication 2 UNIT(S): at 09:00

## 2019-01-25 RX ADMIN — Medication 50 MILLIGRAM(S): at 06:30

## 2019-01-25 RX ADMIN — Medication 81 MILLIGRAM(S): at 13:15

## 2019-01-25 NOTE — PROGRESS NOTE ADULT - ATTENDING COMMENTS
pending PCI/cardiac intervention prior to d.c. suspect d/c monday vs tuesday if pt gets his procedure today    EZIO Khan  pager [phone]

## 2019-01-25 NOTE — PROGRESS NOTE ADULT - PROBLEM SELECTOR PLAN 9
DVT PPx SQH  Aspiration precaution- speech/swallow eval   Fall precaution  Planning for [facility] with likely transition to LTC. Pending PCI today. Insurance auth obtained by expires on 1/29. I have explained this to cardiology- it is imperative he gets LHC today or Monday so patient can be discharged by Tuesday.

## 2019-01-25 NOTE — PROGRESS NOTE ADULT - PROBLEM SELECTOR PLAN 1
-pt with increasing word finding abilities, visual impairments, L sided weakness   -likely cause of acute encephalopathy with possible other contributing factors (ICU delirium etc). Low suspicion for infection  -cont q4h neurochecks  -neuro consult appreciated  -out of bed to chair, frequent reorientation  -appreciate neuro eval- MR head, MRA head and neck unremarkable  -appreciate ophthalmology eval- no acute intervention, outpatient f/u  -EEG with diffuse cerebral dysfunction  -PT/OT, speech/swallow f/u- recommending dysphagia 1 diet  -ENT eval appreciated- no significant pathology detected on bedside scope. VC granuloma seen. No VC paralysis. No thrush.  -eventual rehab when medically stable, awaiting PCI which should be today

## 2019-01-25 NOTE — PROGRESS NOTE ADULT - PROBLEM SELECTOR PLAN 8
-sacral and bilateral heel pressure wounds, improving  -Follow up with Wound care recommendations  -s/p podiatry eval for nail clipping

## 2019-01-25 NOTE — PROGRESS NOTE ADULT - PROBLEM SELECTOR PLAN 3
-Saturating well on RA  -TTE from outside hospital with EF 30-35%, repeat here improved with EF 65% consistent with HFpEF  -lasix 40mg daily PO  -Daily weights and Strict I/Os  -replete lytes  -per EP no AICD at this time, first medical management. awaiting  PCI as above. if pt stented he will need medical therapy for 3 mo before consideration of AICD.  -Continue to monitor respiratory status

## 2019-01-25 NOTE — CHART NOTE - NSCHARTNOTEFT_GEN_A_CORE
Nutrition Follow Up Note    Patient seen for follow up. 77yo male with PMH of CAD with s/p CABG 4v in , multiple stents, HTN, PUD, Gout, DM2, and Acute HFrEF (EF 30-35%), presented to [facility] from [facility] for NSTEMI management. Pt s/p Cardiac cath at [facility] revealing multiple vessel disease without any intervention. 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     Source: Sister at bedside, RN, EMR, Past RD notes    Diet : Dysphagia 1 Puree [place] Thick Liquids, DASH/TLC with Morgan 2x daily and Danactice 2x daily    Patient disoriented/confused, information provided by RN and sister at bedside. Pt will eat if someone is feeding him, will finish 50-75% of meals. Lunch tray sitting at bedside 75% consumed. As per RN, pt consuming Morgan supplements daily. No signs of GI distress, last BM this morning.  Obtained pt food preferences.     PO intake : 75% of meals     Source for PO intake: Sister, RN      Daily Weight in k (), Weight in k (), Weight in k.3 (-), Weight in k.2 (-), Weight in k.9 (-), Weight in k.5 (-20)  14 kg (14%) Weight Change from dosing wt of 100.1 kg, wt seems to be trending down consistently, however ? accuracy given adequate PO intake    Pertinent Medications: MEDICATIONS  (STANDING):  allopurinol 300 milliGRAM(s) Oral daily  amiodarone    Tablet 200 milliGRAM(s) Oral daily  ammonium lactate 12% Lotion 1 Application(s) Topical two times a day  aspirin enteric coated 81 milliGRAM(s) Oral daily  atorvastatin 40 milliGRAM(s) Oral at bedtime  Biotene Dry Mouth Oral Rinse 5 milliLiter(s) Swish and Spit four times a day  cholecalciferol 2000 Unit(s) Oral daily  clopidogrel Tablet 75 milliGRAM(s) Oral daily  dextrose 5%. 1000 milliLiter(s) (50 mL/Hr) IV Continuous <Continuous>  dextrose 50% Injectable 12.5 Gram(s) IV Push once  dextrose 50% Injectable 25 Gram(s) IV Push once  dextrose 50% Injectable 25 Gram(s) IV Push once  doxazosin 2 milliGRAM(s) Oral at bedtime  furosemide    Tablet 40 milliGRAM(s) Oral daily  heparin  Injectable 5000 Unit(s) SubCutaneous every 8 hours  insulin lispro (HumaLOG) corrective regimen sliding scale   SubCutaneous three times a day before meals  insulin lispro (HumaLOG) corrective regimen sliding scale   SubCutaneous at bedtime  insulin lispro Injectable (HumaLOG) 2 Unit(s) SubCutaneous three times a day before meals  losartan 50 milliGRAM(s) Oral daily  magnesium sulfate  IVPB 1 Gram(s) IV Intermittent once  metoprolol succinate ER 50 milliGRAM(s) Oral daily  pantoprazole   Suspension 40 milliGRAM(s) Oral before breakfast  potassium chloride   Powder 40 milliEquivalent(s) Oral once    MEDICATIONS  (PRN):  acetaminophen   Tablet .. 650 milliGRAM(s) Oral every 6 hours PRN Mild Pain (1 - 3), Moderate Pain (4 - 6)  dextrose 40% Gel 15 Gram(s) Oral once PRN Blood Glucose LESS THAN 70 milliGRAM(s)/deciliter  glucagon  Injectable 1 milliGRAM(s) IntraMuscular once PRN Glucose LESS THAN 70 milligrams/deciliter    Pertinent Labs:  @ 06:39: Na 139, BUN 23, Cr 0.86, <H>, K+ 3.9, Phos --, Mg 1.8,     Finger Sticks:  POCT Blood Glucose.: 229 mg/dL ( @ 12:52)  POCT Blood Glucose.: 205 mg/dL ( @ 08:49)  POCT Blood Glucose.: 194 mg/dL ( @ 21:42)  POCT Blood Glucose.: 142 mg/dL ( @ 17:49)      Skin per nursing documentation: DTI on sacrum, DTI on L heal (resolving)  Edema: +1 generalized, left arm    Estimated Needs:   [ X] no change since previous assessment  [ ] recalculated:     Previous Nutrition Diagnosis: increased nutrient needs, protein  Nutrition Diagnosis is: being addressed with supplements and feeding assistance       Interventions:     Recommend  1) Continue DASH/TLC diet, defer consistency to team  2) Honor pt food preferences  3) Assist with feeding and menu selections as needed    Monitoring and Evaluation:     Continue to monitor Nutritional intake, Tolerance to diet prescription, weights, labs, skin integrity    RD remains available upon request and will follow up per protocol Nutrition Follow Up Note    Patient seen for follow up. 77yo male with PMH of CAD with s/p CABG 4v in , multiple stents, HTN, PUD, Gout, DM2, and Acute HFrEF (EF 30-35%), presented to [facility] from [facility] for NSTEMI management. Pt s/p Cardiac cath at [facility] revealing multiple vessel disease without any intervention. 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 MBS performed , recommended dysphagia 1 diet with nectar thick liquids.     Source: Sister at bedside, RN, EMR, Past RD notes    Diet : Dysphagia 1 Puree [place] Thick Liquids, DASH/TLC with Morgan 2x daily and Danactive 2x daily    Patient disoriented/confused, information provided by RN and sister at bedside. Pt will eat if someone is feeding him, will finish 50-75% of meals. Lunch tray sitting at bedside 75% consumed. As per RN, pt consuming Morgan supplements daily. No signs of GI distress, last BM this morning.  Obtained pt food preferences.     PO intake : 75% of meals     Source for PO intake: Sister, RN      Daily Weight in k (), Weight in k (), Weight in k.3 (-), Weight in k.2 (-), Weight in k.9 (-), Weight in k.5 (-)  14 kg (14%) Weight Change from dosing wt of 100.1 kg, trending down consistently pt on lasix    Pertinent Medications: MEDICATIONS  (STANDING):  allopurinol 300 milliGRAM(s) Oral daily  amiodarone    Tablet 200 milliGRAM(s) Oral daily  ammonium lactate 12% Lotion 1 Application(s) Topical two times a day  aspirin enteric coated 81 milliGRAM(s) Oral daily  atorvastatin 40 milliGRAM(s) Oral at bedtime  Biotene Dry Mouth Oral Rinse 5 milliLiter(s) Swish and Spit four times a day  cholecalciferol 2000 Unit(s) Oral daily  clopidogrel Tablet 75 milliGRAM(s) Oral daily  dextrose 5%. 1000 milliLiter(s) (50 mL/Hr) IV Continuous <Continuous>  dextrose 50% Injectable 12.5 Gram(s) IV Push once  dextrose 50% Injectable 25 Gram(s) IV Push once  dextrose 50% Injectable 25 Gram(s) IV Push once  doxazosin 2 milliGRAM(s) Oral at bedtime  furosemide    Tablet 40 milliGRAM(s) Oral daily  heparin  Injectable 5000 Unit(s) SubCutaneous every 8 hours  insulin lispro (HumaLOG) corrective regimen sliding scale   SubCutaneous three times a day before meals  insulin lispro (HumaLOG) corrective regimen sliding scale   SubCutaneous at bedtime  insulin lispro Injectable (HumaLOG) 2 Unit(s) SubCutaneous three times a day before meals  losartan 50 milliGRAM(s) Oral daily  magnesium sulfate  IVPB 1 Gram(s) IV Intermittent once  metoprolol succinate ER 50 milliGRAM(s) Oral daily  pantoprazole   Suspension 40 milliGRAM(s) Oral before breakfast  potassium chloride   Powder 40 milliEquivalent(s) Oral once    MEDICATIONS  (PRN):  acetaminophen   Tablet .. 650 milliGRAM(s) Oral every 6 hours PRN Mild Pain (1 - 3), Moderate Pain (4 - 6)  dextrose 40% Gel 15 Gram(s) Oral once PRN Blood Glucose LESS THAN 70 milliGRAM(s)/deciliter  glucagon  Injectable 1 milliGRAM(s) IntraMuscular once PRN Glucose LESS THAN 70 milligrams/deciliter    Pertinent Labs:  @ 06:39: Na 139, BUN 23, Cr 0.86, <H>, K+ 3.9, Phos --, Mg 1.8,     Finger Sticks:  POCT Blood Glucose.: 229 mg/dL ( @ 12:52)  POCT Blood Glucose.: 205 mg/dL ( @ 08:49)  POCT Blood Glucose.: 194 mg/dL ( @ 21:42)  POCT Blood Glucose.: 142 mg/dL ( @ 17:49)      Skin per nursing documentation: DTI on sacrum, DTI on L heal (resolving)  Edema: +1 generalized, left arm    Estimated Needs:   [ X] no change since previous assessment  [ ] recalculated:     Previous Nutrition Diagnosis: increased nutrient needs, protein  Nutrition Diagnosis is: being addressed with supplements and feeding assistance       Interventions:     Recommend  1) Continue DASH/TLC diet, defer consistency to team  2) Honor pt food preferences  3) Assist with feeding and menu selections as needed    Monitoring and Evaluation:     Continue to monitor Nutritional intake, Tolerance to diet prescription, weights, labs, skin integrity    RD remains available upon request and will follow up per protocol

## 2019-01-25 NOTE — PROGRESS NOTE ADULT - SUBJECTIVE AND OBJECTIVE BOX
Patient is a 78y old  Male who presents with a chief complaint of cardiac arrest. (24 Jan 2019 16:19)      SUBJECTIVE / OVERNIGHT EVENTS:    no overnight events. pt complaints of dry mouth/thirst.  denies cp, sob, sore throat, fever/chills, cough.      planned for LHC with PCI today. As discussed with Dr. Morgan    MEDICATIONS  (STANDING):  allopurinol 300 milliGRAM(s) Oral daily  amiodarone    Tablet 200 milliGRAM(s) Oral daily  ammonium lactate 12% Lotion 1 Application(s) Topical two times a day  aspirin enteric coated 81 milliGRAM(s) Oral daily  atorvastatin 40 milliGRAM(s) Oral at bedtime  Biotene Dry Mouth Oral Rinse 5 milliLiter(s) Swish and Spit four times a day  cholecalciferol 2000 Unit(s) Oral daily  clopidogrel Tablet 75 milliGRAM(s) Oral daily  dextrose 5%. 1000 milliLiter(s) (50 mL/Hr) IV Continuous <Continuous>  dextrose 50% Injectable 12.5 Gram(s) IV Push once  dextrose 50% Injectable 25 Gram(s) IV Push once  dextrose 50% Injectable 25 Gram(s) IV Push once  doxazosin 2 milliGRAM(s) Oral at bedtime  furosemide    Tablet 40 milliGRAM(s) Oral daily  heparin  Injectable 5000 Unit(s) SubCutaneous every 8 hours  insulin lispro (HumaLOG) corrective regimen sliding scale   SubCutaneous three times a day before meals  insulin lispro (HumaLOG) corrective regimen sliding scale   SubCutaneous at bedtime  insulin lispro Injectable (HumaLOG) 2 Unit(s) SubCutaneous three times a day before meals  losartan 50 milliGRAM(s) Oral daily  metoprolol succinate ER 50 milliGRAM(s) Oral daily  pantoprazole   Suspension 40 milliGRAM(s) Oral before breakfast    MEDICATIONS  (PRN):  acetaminophen   Tablet .. 650 milliGRAM(s) Oral every 6 hours PRN Mild Pain (1 - 3), Moderate Pain (4 - 6)  dextrose 40% Gel 15 Gram(s) Oral once PRN Blood Glucose LESS THAN 70 milliGRAM(s)/deciliter  glucagon  Injectable 1 milliGRAM(s) IntraMuscular once PRN Glucose LESS THAN 70 milligrams/deciliter        CAPILLARY BLOOD GLUCOSE      POCT Blood Glucose.: 229 mg/dL (25 Jan 2019 12:52)  POCT Blood Glucose.: 205 mg/dL (25 Jan 2019 08:49)  POCT Blood Glucose.: 194 mg/dL (24 Jan 2019 21:42)  POCT Blood Glucose.: 142 mg/dL (24 Jan 2019 17:49)    I&O's Summary    24 Jan 2019 07:01  -  25 Jan 2019 07:00  --------------------------------------------------------  IN: 980 mL / OUT: 0 mL / NET: 980 mL    25 Jan 2019 07:01  -  25 Jan 2019 13:46  --------------------------------------------------------  IN: 360 mL / OUT: 0 mL / NET: 360 mL      T 98.8, P 86, /69, R 18, O2 96% RA  PHYSICAL EXAM:  GENERAL: NAD, well-developed  HEAD:  Atraumatic, Normocephalic  EYES: EOMI, PERRLA, conjunctiva and sclera clear  Mouth: poor oral hygeine  CHEST/LUNG: Clear to auscultation bilaterally; No wheeze  HEART: Regular rate and rhythm; 2/6 SAE; No rubs or gallops  ABDOMEN: Soft, Nontender, Nondistended; Bowel sounds present  EXTREMITIES:  LE's: 2+ Peripheral Pulses, No clubbing, cyanosis, or edema. LUE swelling resolved.    LABS:                        11.7   4.5   )-----------( 82       ( 25 Jan 2019 06:39 )             34.4     01-25    139  |  101  |  23  ----------------------------<  175<H>  3.9   |  26  |  0.86    Ca    9.0      25 Jan 2019 06:39  Mg     1.8     01-25                RADIOLOGY & ADDITIONAL TESTS:    Care Discussed with Consultants/Other Providers: medicine NICK Mccracken, cardiology Dr. Morgan

## 2019-01-25 NOTE — PROGRESS NOTE ADULT - PROBLEM SELECTOR PLAN 2
-s/p  V. 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   -continue optimized medical management  -PCI prior to D/C- discussed with cards Dr. Morgan today, should be done today by Dr. Roberts  -Continue ASA/Plavix/Metoprolol/Statin  -TTE with EF 65%, severe MR.

## 2019-01-25 NOTE — PROGRESS NOTE ADULT - ASSESSMENT
77yo male with hx of CAD with s/p CABG 4v in 1997, multiple stents, HTN, PUD, Gout, DM2, and Acute HFrEF (EF 30-35%), presented to [facility] from [facility] for NSTEMI management after stabilization from complications of V. Fib arrest s/p ROSC after 20mins of CPR and ICU stay complicated by sepsis secondary to Aspiration PNA. Pt s/p Cardiac cath at [facility] revealing multiple vessel disease without any intervention, recommending medical optimization and stabilization prior to PCI. 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     MRI/MRA  EXAM:  MR ANGIO BRAIN                        EXAM:  MR ANGIO NECK                        EXAM:  MR BRAIN                          PROCEDURE DATE:  01/17/2019      INTERPRETATION:  History: Left-sided weakness. V. fib arrest for 20   minutes with CPR..  Description: A noncontrast brain MRI, 3-D time-of-flight brain MRA, 3D   TOF neck MRA, and 2D time of flight neck MRA were performed.  Comparison: Head CT 01/12/2019..    Brain MRI:  Sagittal T1, axial T1, T2, FLAIR, SWI, GRE, and diffusion-weighted series   with ADC maps were performed.  There is no evidence for acute infarct, acute hemorrhage, mass effect, or   hydrocephalus.  A punctate focus of decreased SWI signal involves the right temporal lobe   without surrounding edema which may reflect an old microbleed,   calcification, or a tiny cavernous malformation.  Moderate patchy foci of increased T2 and FLAIR signal are present in the   periventricular and subcortical white matter which most likely represent   chronic microvascular ischemic changes given the patient's age.   Cerebral volume loss is present.    Brain MRA:  There is no evidence for focal stenosis, major vessel occlusion, or   aneurysm. Tiny aneurysms could be beyond the resolution of MRA technique.    Neck MRA:  No significant carotid or vertebral artery stenosis is noted in the neck   by NASCET criteria.    IMPRESSION:  1. On the brain MRI, there is no evidence for acute infarct or acute   hemorrhage. Moderate chronic white matter changes are present.  2. No evidence for significant intracranial or extracranial stenosis on   the MRA studies.    FREDERIC NASH M.D., ATTENDING RADIOLOGIST  This document has been electronically signed. Jan 18 2019  8:52AM

## 2019-01-26 LAB
ANION GAP SERPL CALC-SCNC: 12 MMOL/L — SIGNIFICANT CHANGE UP (ref 5–17)
APPEARANCE UR: CLEAR — SIGNIFICANT CHANGE UP
BACTERIA # UR AUTO: ABNORMAL
BILIRUB UR-MCNC: NEGATIVE — SIGNIFICANT CHANGE UP
BUN SERPL-MCNC: 29 MG/DL — HIGH (ref 7–23)
CALCIUM SERPL-MCNC: 9.1 MG/DL — SIGNIFICANT CHANGE UP (ref 8.4–10.5)
CHLORIDE SERPL-SCNC: 102 MMOL/L — SIGNIFICANT CHANGE UP (ref 96–108)
CO2 SERPL-SCNC: 27 MMOL/L — SIGNIFICANT CHANGE UP (ref 22–31)
COLOR SPEC: YELLOW — SIGNIFICANT CHANGE UP
CREAT SERPL-MCNC: 0.89 MG/DL — SIGNIFICANT CHANGE UP (ref 0.5–1.3)
DIFF PNL FLD: NEGATIVE — SIGNIFICANT CHANGE UP
EPI CELLS # UR: 1 /HPF — SIGNIFICANT CHANGE UP
GLUCOSE BLDC GLUCOMTR-MCNC: 198 MG/DL — HIGH (ref 70–99)
GLUCOSE BLDC GLUCOMTR-MCNC: 208 MG/DL — HIGH (ref 70–99)
GLUCOSE BLDC GLUCOMTR-MCNC: 209 MG/DL — HIGH (ref 70–99)
GLUCOSE BLDC GLUCOMTR-MCNC: 226 MG/DL — HIGH (ref 70–99)
GLUCOSE BLDC GLUCOMTR-MCNC: 296 MG/DL — HIGH (ref 70–99)
GLUCOSE SERPL-MCNC: 223 MG/DL — HIGH (ref 70–99)
GLUCOSE UR QL: NEGATIVE — SIGNIFICANT CHANGE UP
HCT VFR BLD CALC: 36.2 % — LOW (ref 39–50)
HGB BLD-MCNC: 12.4 G/DL — LOW (ref 13–17)
HYALINE CASTS # UR AUTO: 2 /LPF — SIGNIFICANT CHANGE UP (ref 0–2)
KETONES UR-MCNC: NEGATIVE — SIGNIFICANT CHANGE UP
LEUKOCYTE ESTERASE UR-ACNC: NEGATIVE — SIGNIFICANT CHANGE UP
MCHC RBC-ENTMCNC: 34.3 PG — HIGH (ref 27–34)
MCHC RBC-ENTMCNC: 34.4 GM/DL — SIGNIFICANT CHANGE UP (ref 32–36)
MCV RBC AUTO: 99.8 FL — SIGNIFICANT CHANGE UP (ref 80–100)
NITRITE UR-MCNC: NEGATIVE — SIGNIFICANT CHANGE UP
PH UR: 6 — SIGNIFICANT CHANGE UP (ref 5–8)
PLATELET # BLD AUTO: 92 K/UL — LOW (ref 150–400)
POTASSIUM SERPL-MCNC: 3.9 MMOL/L — SIGNIFICANT CHANGE UP (ref 3.5–5.3)
POTASSIUM SERPL-SCNC: 3.9 MMOL/L — SIGNIFICANT CHANGE UP (ref 3.5–5.3)
PROT UR-MCNC: SIGNIFICANT CHANGE UP
RBC # BLD: 3.62 M/UL — LOW (ref 4.2–5.8)
RBC # FLD: 13.8 % — SIGNIFICANT CHANGE UP (ref 10.3–14.5)
RBC CASTS # UR COMP ASSIST: 1 /HPF — SIGNIFICANT CHANGE UP (ref 0–4)
SODIUM SERPL-SCNC: 141 MMOL/L — SIGNIFICANT CHANGE UP (ref 135–145)
SP GR SPEC: 1.02 — SIGNIFICANT CHANGE UP (ref 1.01–1.02)
UROBILINOGEN FLD QL: ABNORMAL
WBC # BLD: 5.1 K/UL — SIGNIFICANT CHANGE UP (ref 3.8–10.5)
WBC # FLD AUTO: 5.1 K/UL — SIGNIFICANT CHANGE UP (ref 3.8–10.5)
WBC UR QL: 2 /HPF — SIGNIFICANT CHANGE UP (ref 0–5)

## 2019-01-26 PROCEDURE — 71045 X-RAY EXAM CHEST 1 VIEW: CPT | Mod: 26

## 2019-01-26 PROCEDURE — 99233 SBSQ HOSP IP/OBS HIGH 50: CPT

## 2019-01-26 RX ADMIN — Medication 2 UNIT(S): at 17:41

## 2019-01-26 RX ADMIN — Medication 50 MILLIGRAM(S): at 05:47

## 2019-01-26 RX ADMIN — HEPARIN SODIUM 5000 UNIT(S): 5000 INJECTION INTRAVENOUS; SUBCUTANEOUS at 13:12

## 2019-01-26 RX ADMIN — Medication 2 MILLIGRAM(S): at 21:21

## 2019-01-26 RX ADMIN — Medication 2 UNIT(S): at 13:11

## 2019-01-26 RX ADMIN — AMIODARONE HYDROCHLORIDE 200 MILLIGRAM(S): 400 TABLET ORAL at 05:47

## 2019-01-26 RX ADMIN — Medication 4: at 09:27

## 2019-01-26 RX ADMIN — Medication 2 UNIT(S): at 09:26

## 2019-01-26 RX ADMIN — Medication 5 MILLILITER(S): at 11:55

## 2019-01-26 RX ADMIN — Medication 4: at 13:11

## 2019-01-26 RX ADMIN — Medication 300 MILLIGRAM(S): at 09:28

## 2019-01-26 RX ADMIN — Medication 81 MILLIGRAM(S): at 09:28

## 2019-01-26 RX ADMIN — HEPARIN SODIUM 5000 UNIT(S): 5000 INJECTION INTRAVENOUS; SUBCUTANEOUS at 21:21

## 2019-01-26 RX ADMIN — Medication 5 MILLILITER(S): at 01:29

## 2019-01-26 RX ADMIN — LOSARTAN POTASSIUM 50 MILLIGRAM(S): 100 TABLET, FILM COATED ORAL at 05:47

## 2019-01-26 RX ADMIN — HEPARIN SODIUM 5000 UNIT(S): 5000 INJECTION INTRAVENOUS; SUBCUTANEOUS at 05:47

## 2019-01-26 RX ADMIN — Medication 5 MILLILITER(S): at 05:51

## 2019-01-26 RX ADMIN — Medication 6: at 17:41

## 2019-01-26 RX ADMIN — Medication 1 APPLICATION(S): at 05:49

## 2019-01-26 RX ADMIN — PANTOPRAZOLE SODIUM 40 MILLIGRAM(S): 20 TABLET, DELAYED RELEASE ORAL at 05:49

## 2019-01-26 RX ADMIN — Medication 5 MILLILITER(S): at 17:30

## 2019-01-26 RX ADMIN — CLOPIDOGREL BISULFATE 75 MILLIGRAM(S): 75 TABLET, FILM COATED ORAL at 09:28

## 2019-01-26 RX ADMIN — Medication 1 APPLICATION(S): at 17:30

## 2019-01-26 RX ADMIN — ATORVASTATIN CALCIUM 40 MILLIGRAM(S): 80 TABLET, FILM COATED ORAL at 21:21

## 2019-01-26 RX ADMIN — Medication 40 MILLIGRAM(S): at 05:47

## 2019-01-26 RX ADMIN — Medication 2000 UNIT(S): at 09:28

## 2019-01-26 NOTE — PROGRESS NOTE ADULT - PROBLEM SELECTOR PLAN 1
- Patient with increasing word finding abilities, visual impairments, L sided weakness   - Likely cause of acute encephalopathy with possible other contributing factors (ICU   delirium etc). Low suspicion for infection  - Cont q4h neurochecks  - Neuro consult appreciated  - Out of bed to chair, frequent reorientation  - Appreciate neuro eval- MR head, MRA head and neck unremarkable  - Appreciate ophthalmology eval- no acute intervention, outpatient f/u  - EEG with diffuse cerebral dysfunction  - PT/OT, speech/swallow f/u- recommending dysphagia 1 diet  - ENT eval appreciated- no significant pathology detected on bedside scope. VC   granuloma seen. No VC paralysis. No thrush.  - Eventual rehab when medically stable, awaiting PCI

## 2019-01-26 NOTE — PROGRESS NOTE ADULT - ASSESSMENT
77yo male with hx of CAD with s/p CABG 4v in 1997, multiple stents, HTN, PUD, Gout, DM2, and Acute HFrEF (EF 30-35%), presented to [facility] from [facility] for NSTEMI management after stabilization from complications of V. Fib arrest s/p ROSC after 20mins of CPR and ICU stay complicated by sepsis secondary to Aspiration PNA. Pt s/p Cardiac cath at [facility] revealing multiple vessel disease without any intervention, recommending medical optimization and stabilization prior to PCI. 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     MRI/MRA  EXAM:  MR ANGIO BRAIN                        EXAM:  MR ANGIO NECK                        EXAM:  MR BRAIN                          PROCEDURE DATE:  01/17/2019      INTERPRETATION:  History: Left-sided weakness. V. fib arrest for 20   minutes with CPR..  Description: A noncontrast brain MRI, 3-D time-of-flight brain MRA, 3D   TOF neck MRA, and 2D time of flight neck MRA were performed.  Comparison: Head CT 01/12/2019..    Brain MRI:  Sagittal T1, axial T1, T2, FLAIR, SWI, GRE, and diffusion-weighted series   with ADC maps were performed.  There is no evidence for acute infarct, acute hemorrhage, mass effect, or   hydrocephalus.  A punctate focus of decreased SWI signal involves the right temporal lobe   without surrounding edema which may reflect an old microbleed,   calcification, or a tiny cavernous malformation.  Moderate patchy foci of increased T2 and FLAIR signal are present in the   periventricular and subcortical white matter which most likely represent   chronic microvascular ischemic changes given the patient's age.   Cerebral volume loss is present.    Brain MRA:  There is no evidence for focal stenosis, major vessel occlusion, or   aneurysm. Tiny aneurysms could be beyond the resolution of MRA technique.    Neck MRA:  No significant carotid or vertebral artery stenosis is noted in the neck   by NASCET criteria.    IMPRESSION:  1. On the brain MRI, there is no evidence for acute infarct or acute   hemorrhage. Moderate chronic white matter changes are present.  2. No evidence for significant intracranial or extracranial stenosis on   the MRA studies.    FREDERIC NASH M.D., ATTENDING RADIOLOGIST  This document has been electronically signed. Jan 18 2019  8:52AM

## 2019-01-26 NOTE — PROGRESS NOTE ADULT - SUBJECTIVE AND OBJECTIVE BOX
Patient is a 78y old  Male who presents with a chief complaint of cardiac arrest (25 Jan 2019 13:46)      SUBJECTIVE / OVERNIGHT EVENTS:  No acute events overnight.  Patient resting comfortably    MEDICATIONS  (STANDING):  allopurinol 300 milliGRAM(s) Oral daily  amiodarone    Tablet 200 milliGRAM(s) Oral daily  ammonium lactate 12% Lotion 1 Application(s) Topical two times a day  aspirin enteric coated 81 milliGRAM(s) Oral daily  atorvastatin 40 milliGRAM(s) Oral at bedtime  Biotene Dry Mouth Oral Rinse 5 milliLiter(s) Swish and Spit four times a day  cholecalciferol 2000 Unit(s) Oral daily  clopidogrel Tablet 75 milliGRAM(s) Oral daily  dextrose 5%. 1000 milliLiter(s) (50 mL/Hr) IV Continuous <Continuous>  dextrose 50% Injectable 12.5 Gram(s) IV Push once  dextrose 50% Injectable 25 Gram(s) IV Push once  dextrose 50% Injectable 25 Gram(s) IV Push once  doxazosin 2 milliGRAM(s) Oral at bedtime  furosemide    Tablet 40 milliGRAM(s) Oral daily  heparin  Injectable 5000 Unit(s) SubCutaneous every 8 hours  insulin lispro (HumaLOG) corrective regimen sliding scale   SubCutaneous three times a day before meals  insulin lispro (HumaLOG) corrective regimen sliding scale   SubCutaneous at bedtime  insulin lispro Injectable (HumaLOG) 2 Unit(s) SubCutaneous three times a day before meals  losartan 50 milliGRAM(s) Oral daily  metoprolol succinate ER 50 milliGRAM(s) Oral daily  pantoprazole   Suspension 40 milliGRAM(s) Oral before breakfast    MEDICATIONS  (PRN):  acetaminophen   Tablet .. 650 milliGRAM(s) Oral every 6 hours PRN Mild Pain (1 - 3), Moderate Pain (4 - 6)  dextrose 40% Gel 15 Gram(s) Oral once PRN Blood Glucose LESS THAN 70 milliGRAM(s)/deciliter  glucagon  Injectable 1 milliGRAM(s) IntraMuscular once PRN Glucose LESS THAN 70 milligrams/deciliter      CAPILLARY BLOOD GLUCOSE      POCT Blood Glucose.: 208 mg/dL (26 Jan 2019 09:25)  POCT Blood Glucose.: 209 mg/dL (26 Jan 2019 05:41)  POCT Blood Glucose.: 156 mg/dL (25 Jan 2019 21:58)  POCT Blood Glucose.: 201 mg/dL (25 Jan 2019 17:52)  POCT Blood Glucose.: 229 mg/dL (25 Jan 2019 12:52)    I&O's Summary    25 Jan 2019 07:01  -  26 Jan 2019 07:00  --------------------------------------------------------  IN: 680 mL / OUT: 650 mL / NET: 30 mL        PHYSICAL EXAM:  Vital Signs Last 24 Hrs  T(C): 36.9 (26 Jan 2019 09:38), Max: 36.9 (25 Jan 2019 21:13)  T(F): 98.5 (26 Jan 2019 09:38), Max: 98.5 (25 Jan 2019 21:13)  HR: 90 (26 Jan 2019 09:38) (90 - 106)  BP: 102/66 (26 Jan 2019 09:38) (102/66 - 125/72)  BP(mean): --  RR: 16 (26 Jan 2019 09:38) (16 - 19)  SpO2: 95% (26 Jan 2019 09:38) (95% - 97%)  GENERAL: NAD, well-developed  HEAD:  Atraumatic, Normocephalic  EYES: EOMI, PERRLA, conjunctiva and sclera clear  NECK: Supple, No JVD  CHEST/LUNG: Clear to auscultation bilaterally; No wheeze  HEART: Regular rate and rhythm; No murmurs, rubs, or gallops  ABDOMEN: Soft, Nontender, Nondistended; Bowel sounds present  EXTREMITIES:  2+ Peripheral Pulses, No clubbing, cyanosis, or edema  PSYCH: Confused  NEUROLOGY: non-focal  SKIN: No rashes or lesions    LABS:                        12.4   5.1   )-----------( 92       ( 26 Jan 2019 07:32 )             36.2     01-26    141  |  102  |  29<H>  ----------------------------<  223<H>  3.9   |  27  |  0.89    Ca    9.1      26 Jan 2019 07:32  Mg     1.8     01-25                RADIOLOGY & ADDITIONAL TESTS:    Imaging Personally Reviewed:    Consultant(s) Notes Reviewed:      Care Discussed with Consultants/Other Providers:

## 2019-01-26 NOTE — PROGRESS NOTE ADULT - PROBLEM SELECTOR PLAN 3
- Saturating well on RA  - TTE from outside hospital with EF 30-35%, repeat here improved with EF 65%   consistent with HFpEF  - Lasix 40mg daily PO  - Daily weights and Strict I/Os  - Per EP no AICD at this time, first medical management. awaiting  PCI as above. if   pt stented he will need medical therapy for 3 mo before consideration of AICD.  - Continue to monitor respiratory status

## 2019-01-26 NOTE — PROVIDER CONTACT NOTE (OTHER) - ASSESSMENT
Patient has a low grade fever of 100.3 orally and is diaphoretic. Patient is A&OX3 forgetful. Patient speech is hypophonic. Patient denies pain or discomfort at this time. VS T-100.3(orally) P-97 R-16 Bp 101/67 O2-95% on room air. . Lung sounds clear on auscultation throughout.. No adventitious breath sounds

## 2019-01-26 NOTE — PROGRESS NOTE ADULT - PROBLEM SELECTOR PLAN 8
- Sacral and bilateral heel pressure wounds, improving  - Follow up with Wound care recommendations  - S/p podiatry eval for nail clipping

## 2019-01-26 NOTE — PROGRESS NOTE ADULT - PROBLEM SELECTOR PLAN 4
- S/p CABG  - NSTEMI c/b V Fib arrest s/p ROSC after 20mins of CPR.  - Continue Amiodarone  - Cont medical management as above

## 2019-01-26 NOTE — PROGRESS NOTE ADULT - PROBLEM SELECTOR PLAN 6
- Chronic  - Continue SSI +Fingerstick  - Continue premeal Humalog 2 units 3x daily  - Not requiring basal insulin at this time

## 2019-01-26 NOTE — PROGRESS NOTE ADULT - PROBLEM SELECTOR PLAN 5
- Stable Hb at 11 with MCV> 100  - B12/Folate WNL    HROMBOCYTOPENIA- unclear cause. improving. hold dvt ppx if plts drop <50,000

## 2019-01-26 NOTE — PROVIDER CONTACT NOTE (OTHER) - ACTION/TREATMENT ORDERED:
NP notified and aware. NP went and assess the patient. As per NP blood culture, urinalysis and chest x-ray STAT. Patient is stable at this time. Will continue to monitor

## 2019-01-26 NOTE — PROGRESS NOTE ADULT - PROBLEM SELECTOR PLAN 2
- S/p  V. Fib arrest at OSH s/p ROSC after 20mins of CPR  - S/p Cath on 1/11 which revealed Proximal LCX 95% in-stent stenosis as well as   90% RPDA stenosis.   - Continue optimized medical management  - PCI prior to D/C- discussed with cards Dr. Morgan today, should be done today by   Dr. Roberts  - Continue ASA/Plavix/Metoprolol/Statin  - TTE with EF 65%, severe MR.

## 2019-01-27 DIAGNOSIS — R50.9 FEVER, UNSPECIFIED: ICD-10-CM

## 2019-01-27 LAB
ANION GAP SERPL CALC-SCNC: 13 MMOL/L — SIGNIFICANT CHANGE UP (ref 5–17)
BUN SERPL-MCNC: 30 MG/DL — HIGH (ref 7–23)
CALCIUM SERPL-MCNC: 8.9 MG/DL — SIGNIFICANT CHANGE UP (ref 8.4–10.5)
CHLORIDE SERPL-SCNC: 102 MMOL/L — SIGNIFICANT CHANGE UP (ref 96–108)
CO2 SERPL-SCNC: 27 MMOL/L — SIGNIFICANT CHANGE UP (ref 22–31)
CREAT SERPL-MCNC: 0.9 MG/DL — SIGNIFICANT CHANGE UP (ref 0.5–1.3)
GLUCOSE BLDC GLUCOMTR-MCNC: 208 MG/DL — HIGH (ref 70–99)
GLUCOSE BLDC GLUCOMTR-MCNC: 212 MG/DL — HIGH (ref 70–99)
GLUCOSE BLDC GLUCOMTR-MCNC: 244 MG/DL — HIGH (ref 70–99)
GLUCOSE BLDC GLUCOMTR-MCNC: 247 MG/DL — HIGH (ref 70–99)
GLUCOSE SERPL-MCNC: 229 MG/DL — HIGH (ref 70–99)
HCT VFR BLD CALC: 35.9 % — LOW (ref 39–50)
HGB BLD-MCNC: 12.5 G/DL — LOW (ref 13–17)
MAGNESIUM SERPL-MCNC: 1.8 MG/DL — SIGNIFICANT CHANGE UP (ref 1.6–2.6)
MCHC RBC-ENTMCNC: 34.6 PG — HIGH (ref 27–34)
MCHC RBC-ENTMCNC: 34.7 GM/DL — SIGNIFICANT CHANGE UP (ref 32–36)
MCV RBC AUTO: 99.7 FL — SIGNIFICANT CHANGE UP (ref 80–100)
PHOSPHATE SERPL-MCNC: 3.4 MG/DL — SIGNIFICANT CHANGE UP (ref 2.5–4.5)
PLATELET # BLD AUTO: 87 K/UL — LOW (ref 150–400)
POTASSIUM SERPL-MCNC: 3.7 MMOL/L — SIGNIFICANT CHANGE UP (ref 3.5–5.3)
POTASSIUM SERPL-SCNC: 3.7 MMOL/L — SIGNIFICANT CHANGE UP (ref 3.5–5.3)
RBC # BLD: 3.6 M/UL — LOW (ref 4.2–5.8)
RBC # FLD: 13.7 % — SIGNIFICANT CHANGE UP (ref 10.3–14.5)
SODIUM SERPL-SCNC: 142 MMOL/L — SIGNIFICANT CHANGE UP (ref 135–145)
WBC # BLD: 5.4 K/UL — SIGNIFICANT CHANGE UP (ref 3.8–10.5)
WBC # FLD AUTO: 5.4 K/UL — SIGNIFICANT CHANGE UP (ref 3.8–10.5)

## 2019-01-27 PROCEDURE — 99233 SBSQ HOSP IP/OBS HIGH 50: CPT

## 2019-01-27 RX ADMIN — Medication 2 UNIT(S): at 13:15

## 2019-01-27 RX ADMIN — Medication 2000 UNIT(S): at 09:14

## 2019-01-27 RX ADMIN — Medication 50 MILLIGRAM(S): at 05:09

## 2019-01-27 RX ADMIN — AMIODARONE HYDROCHLORIDE 200 MILLIGRAM(S): 400 TABLET ORAL at 05:09

## 2019-01-27 RX ADMIN — Medication 1 APPLICATION(S): at 17:10

## 2019-01-27 RX ADMIN — Medication 4: at 13:15

## 2019-01-27 RX ADMIN — Medication 1 APPLICATION(S): at 05:10

## 2019-01-27 RX ADMIN — HEPARIN SODIUM 5000 UNIT(S): 5000 INJECTION INTRAVENOUS; SUBCUTANEOUS at 05:09

## 2019-01-27 RX ADMIN — Medication 2 UNIT(S): at 18:00

## 2019-01-27 RX ADMIN — Medication 81 MILLIGRAM(S): at 09:14

## 2019-01-27 RX ADMIN — Medication 2 UNIT(S): at 09:12

## 2019-01-27 RX ADMIN — Medication 300 MILLIGRAM(S): at 09:14

## 2019-01-27 RX ADMIN — Medication 5 MILLILITER(S): at 17:10

## 2019-01-27 RX ADMIN — Medication 5 MILLILITER(S): at 00:08

## 2019-01-27 RX ADMIN — ATORVASTATIN CALCIUM 40 MILLIGRAM(S): 80 TABLET, FILM COATED ORAL at 21:32

## 2019-01-27 RX ADMIN — LOSARTAN POTASSIUM 50 MILLIGRAM(S): 100 TABLET, FILM COATED ORAL at 05:09

## 2019-01-27 RX ADMIN — HEPARIN SODIUM 5000 UNIT(S): 5000 INJECTION INTRAVENOUS; SUBCUTANEOUS at 21:32

## 2019-01-27 RX ADMIN — CLOPIDOGREL BISULFATE 75 MILLIGRAM(S): 75 TABLET, FILM COATED ORAL at 09:14

## 2019-01-27 RX ADMIN — PANTOPRAZOLE SODIUM 40 MILLIGRAM(S): 20 TABLET, DELAYED RELEASE ORAL at 05:10

## 2019-01-27 RX ADMIN — Medication 40 MILLIGRAM(S): at 05:09

## 2019-01-27 RX ADMIN — HEPARIN SODIUM 5000 UNIT(S): 5000 INJECTION INTRAVENOUS; SUBCUTANEOUS at 13:16

## 2019-01-27 RX ADMIN — Medication 5 MILLILITER(S): at 11:21

## 2019-01-27 RX ADMIN — Medication 5 MILLILITER(S): at 05:09

## 2019-01-27 RX ADMIN — Medication 4: at 09:12

## 2019-01-27 RX ADMIN — Medication 2 MILLIGRAM(S): at 21:32

## 2019-01-27 RX ADMIN — Medication 4: at 18:00

## 2019-01-27 NOTE — PROGRESS NOTE ADULT - SUBJECTIVE AND OBJECTIVE BOX
Patient is a 78y old  Male who presents with a chief complaint of Cardiac Arrest (2019 11:08)      SUBJECTIVE / OVERNIGHT EVENTS:  Patient with low grade fever yesterday.  No acute events overnight.     MEDICATIONS  (STANDING):  allopurinol 300 milliGRAM(s) Oral daily  amiodarone    Tablet 200 milliGRAM(s) Oral daily  ammonium lactate 12% Lotion 1 Application(s) Topical two times a day  aspirin enteric coated 81 milliGRAM(s) Oral daily  atorvastatin 40 milliGRAM(s) Oral at bedtime  Biotene Dry Mouth Oral Rinse 5 milliLiter(s) Swish and Spit four times a day  cholecalciferol 2000 Unit(s) Oral daily  clopidogrel Tablet 75 milliGRAM(s) Oral daily  dextrose 5%. 1000 milliLiter(s) (50 mL/Hr) IV Continuous <Continuous>  dextrose 50% Injectable 12.5 Gram(s) IV Push once  dextrose 50% Injectable 25 Gram(s) IV Push once  dextrose 50% Injectable 25 Gram(s) IV Push once  doxazosin 2 milliGRAM(s) Oral at bedtime  furosemide    Tablet 40 milliGRAM(s) Oral daily  heparin  Injectable 5000 Unit(s) SubCutaneous every 8 hours  insulin lispro (HumaLOG) corrective regimen sliding scale   SubCutaneous three times a day before meals  insulin lispro (HumaLOG) corrective regimen sliding scale   SubCutaneous at bedtime  insulin lispro Injectable (HumaLOG) 2 Unit(s) SubCutaneous three times a day before meals  losartan 50 milliGRAM(s) Oral daily  metoprolol succinate ER 50 milliGRAM(s) Oral daily  pantoprazole   Suspension 40 milliGRAM(s) Oral before breakfast    MEDICATIONS  (PRN):  acetaminophen   Tablet .. 650 milliGRAM(s) Oral every 6 hours PRN Mild Pain (1 - 3), Moderate Pain (4 - 6)  dextrose 40% Gel 15 Gram(s) Oral once PRN Blood Glucose LESS THAN 70 milliGRAM(s)/deciliter  glucagon  Injectable 1 milliGRAM(s) IntraMuscular once PRN Glucose LESS THAN 70 milligrams/deciliter      CAPILLARY BLOOD GLUCOSE      POCT Blood Glucose.: 212 mg/dL (2019 09:01)  POCT Blood Glucose.: 198 mg/dL (2019 21:47)  POCT Blood Glucose.: 296 mg/dL (2019 17:39)  POCT Blood Glucose.: 226 mg/dL (2019 13:09)    I&O's Summary    2019 07:01  -  2019 07:00  --------------------------------------------------------  IN: 1040 mL / OUT: 0 mL / NET: 1040 mL    2019 07:  -  2019 11:45  --------------------------------------------------------  IN: 120 mL / OUT: 0 mL / NET: 120 mL        PHYSICAL EXAM:  Vital Signs Last 24 Hrs  T(C): 36.8 (2019 04:25), Max: 37.9 (2019 13:00)  T(F): 98.3 (2019 04:25), Max: 100.3 (2019 13:00)  HR: 105 (2019 04:25) (97 - 105)  BP: 108/71 (2019 04:25) (99/60 - 145/76)  BP(mean): --  RR: 18 (2019 04:25) (16 - 18)  SpO2: 95% (2019 04:25) (94% - 99%)  GENERAL: NAD, well-developed  HEAD:  Atraumatic, Normocephalic  EYES: EOMI, PERRLA, conjunctiva and sclera clear  NECK: Supple, No JVD  CHEST/LUNG: Decreased breath sounds at bases.  HEART: Regular rate and rhythm; No murmurs, rubs, or gallops  ABDOMEN: Soft, Nontender, Nondistended; Bowel sounds present  EXTREMITIES:  2+ Peripheral Pulses, No clubbing, cyanosis, or edema  PSYCH: Awake, minimally responsive  NEUROLOGY: non-focal  SKIN: No rashes or lesions    LABS:                        12.5   5.4   )-----------( 87       ( 2019 06:41 )             35.9     -    142  |  102  |  30<H>  ----------------------------<  229<H>  3.7   |  27  |  0.90    Ca    8.9      2019 06:42  Phos  3.4       Mg     1.8                 Urinalysis Basic - ( 2019 18:41 )    Color: Yellow / Appearance: Clear / S.019 / pH: x  Gluc: x / Ketone: Negative  / Bili: Negative / Urobili: 2 mg/dL   Blood: x / Protein: Trace / Nitrite: Negative   Leuk Esterase: Negative / RBC: 1 /hpf / WBC 2 /HPF   Sq Epi: x / Non Sq Epi: 1 /hpf / Bacteria: Moderate        RADIOLOGY & ADDITIONAL TESTS:    Imaging Personally Reviewed:    Consultant(s) Notes Reviewed:      Care Discussed with Consultants/Other Providers:

## 2019-01-27 NOTE — CHART NOTE - NSCHARTNOTEFT_GEN_A_CORE
Notified by RN patient on tele with event of 9 B AIVR. Patient seen and examined in NAD, denies any symptoms. MG/phos sent for AM, f/u cardiology VSS. will endorse to AM team, attending to follow.     Cristino Bullock PA-C   Department of Medicine

## 2019-01-27 NOTE — PROGRESS NOTE ADULT - PROBLEM SELECTOR PLAN 8
- Patient with low grade fever yesterday.  - UA with no indication of UTI.  - Chest Xray with no evidence of PNA. Atelectasis is visible.   - No overt signs of sepsis or infection.   - Close monitoring. If signs of infection, initiate ABX.

## 2019-01-28 LAB
ANION GAP SERPL CALC-SCNC: 11 MMOL/L — SIGNIFICANT CHANGE UP (ref 5–17)
BUN SERPL-MCNC: 32 MG/DL — HIGH (ref 7–23)
CALCIUM SERPL-MCNC: 9.1 MG/DL — SIGNIFICANT CHANGE UP (ref 8.4–10.5)
CHLORIDE SERPL-SCNC: 103 MMOL/L — SIGNIFICANT CHANGE UP (ref 96–108)
CO2 SERPL-SCNC: 29 MMOL/L — SIGNIFICANT CHANGE UP (ref 22–31)
CREAT SERPL-MCNC: 0.9 MG/DL — SIGNIFICANT CHANGE UP (ref 0.5–1.3)
GLUCOSE BLDC GLUCOMTR-MCNC: 229 MG/DL — HIGH (ref 70–99)
GLUCOSE BLDC GLUCOMTR-MCNC: 276 MG/DL — HIGH (ref 70–99)
GLUCOSE BLDC GLUCOMTR-MCNC: 280 MG/DL — HIGH (ref 70–99)
GLUCOSE SERPL-MCNC: 241 MG/DL — HIGH (ref 70–99)
HCT VFR BLD CALC: 35.2 % — LOW (ref 39–50)
HGB BLD-MCNC: 12.1 G/DL — LOW (ref 13–17)
MCHC RBC-ENTMCNC: 34.3 GM/DL — SIGNIFICANT CHANGE UP (ref 32–36)
MCHC RBC-ENTMCNC: 34.5 PG — HIGH (ref 27–34)
MCV RBC AUTO: 101 FL — HIGH (ref 80–100)
PLATELET # BLD AUTO: 89 K/UL — LOW (ref 150–400)
POTASSIUM SERPL-MCNC: 3.6 MMOL/L — SIGNIFICANT CHANGE UP (ref 3.5–5.3)
POTASSIUM SERPL-SCNC: 3.6 MMOL/L — SIGNIFICANT CHANGE UP (ref 3.5–5.3)
RBC # BLD: 3.5 M/UL — LOW (ref 4.2–5.8)
RBC # FLD: 13.7 % — SIGNIFICANT CHANGE UP (ref 10.3–14.5)
SODIUM SERPL-SCNC: 143 MMOL/L — SIGNIFICANT CHANGE UP (ref 135–145)
WBC # BLD: 4.9 K/UL — SIGNIFICANT CHANGE UP (ref 3.8–10.5)
WBC # FLD AUTO: 4.9 K/UL — SIGNIFICANT CHANGE UP (ref 3.8–10.5)

## 2019-01-28 PROCEDURE — 76937 US GUIDE VASCULAR ACCESS: CPT | Mod: 26,GC

## 2019-01-28 PROCEDURE — 93010 ELECTROCARDIOGRAM REPORT: CPT | Mod: 59

## 2019-01-28 PROCEDURE — 92928 PRQ TCAT PLMT NTRAC ST 1 LES: CPT | Mod: LM,GC

## 2019-01-28 PROCEDURE — 92941 PRQ TRLML REVSC TOT OCCL AMI: CPT | Mod: LC,GC

## 2019-01-28 PROCEDURE — 99152 MOD SED SAME PHYS/QHP 5/>YRS: CPT | Mod: GC

## 2019-01-28 PROCEDURE — 99233 SBSQ HOSP IP/OBS HIGH 50: CPT

## 2019-01-28 RX ORDER — METOPROLOL TARTRATE 50 MG
75 TABLET ORAL DAILY
Qty: 0 | Refills: 0 | Status: DISCONTINUED | OUTPATIENT
Start: 2019-01-29 | End: 2019-01-29

## 2019-01-28 RX ADMIN — Medication 4: at 09:21

## 2019-01-28 RX ADMIN — Medication 2 MILLIGRAM(S): at 21:53

## 2019-01-28 RX ADMIN — Medication 2000 UNIT(S): at 09:21

## 2019-01-28 RX ADMIN — Medication 81 MILLIGRAM(S): at 09:20

## 2019-01-28 RX ADMIN — Medication 5 MILLILITER(S): at 05:42

## 2019-01-28 RX ADMIN — CLOPIDOGREL BISULFATE 75 MILLIGRAM(S): 75 TABLET, FILM COATED ORAL at 09:21

## 2019-01-28 RX ADMIN — AMIODARONE HYDROCHLORIDE 200 MILLIGRAM(S): 400 TABLET ORAL at 05:42

## 2019-01-28 RX ADMIN — Medication 1 APPLICATION(S): at 05:43

## 2019-01-28 RX ADMIN — Medication 1 APPLICATION(S): at 18:26

## 2019-01-28 RX ADMIN — Medication 650 MILLIGRAM(S): at 19:35

## 2019-01-28 RX ADMIN — Medication 2 UNIT(S): at 09:21

## 2019-01-28 RX ADMIN — HEPARIN SODIUM 5000 UNIT(S): 5000 INJECTION INTRAVENOUS; SUBCUTANEOUS at 05:42

## 2019-01-28 RX ADMIN — Medication 5 MILLILITER(S): at 00:00

## 2019-01-28 RX ADMIN — Medication 2 UNIT(S): at 18:26

## 2019-01-28 RX ADMIN — Medication 300 MILLIGRAM(S): at 09:20

## 2019-01-28 RX ADMIN — LOSARTAN POTASSIUM 50 MILLIGRAM(S): 100 TABLET, FILM COATED ORAL at 05:42

## 2019-01-28 RX ADMIN — Medication 2: at 21:53

## 2019-01-28 RX ADMIN — HEPARIN SODIUM 5000 UNIT(S): 5000 INJECTION INTRAVENOUS; SUBCUTANEOUS at 21:53

## 2019-01-28 RX ADMIN — PANTOPRAZOLE SODIUM 40 MILLIGRAM(S): 20 TABLET, DELAYED RELEASE ORAL at 05:43

## 2019-01-28 RX ADMIN — Medication 650 MILLIGRAM(S): at 20:05

## 2019-01-28 RX ADMIN — Medication 5 MILLILITER(S): at 23:26

## 2019-01-28 RX ADMIN — Medication 40 MILLIGRAM(S): at 05:42

## 2019-01-28 RX ADMIN — Medication 50 MILLIGRAM(S): at 05:42

## 2019-01-28 RX ADMIN — ATORVASTATIN CALCIUM 40 MILLIGRAM(S): 80 TABLET, FILM COATED ORAL at 21:53

## 2019-01-28 RX ADMIN — Medication 6: at 18:25

## 2019-01-28 RX ADMIN — Medication 5 MILLILITER(S): at 18:26

## 2019-01-28 NOTE — PROGRESS NOTE ADULT - ASSESSMENT
79yo male with hx of CAD with s/p CABG 4v in 1997, multiple stents, HTN, PUD, Gout, DM2, and Acute HFrEF (EF 30-35%), presented to [facility] from [facility] for NSTEMI management after stabilization from complications of V. Fib arrest s/p ROSC after 20mins of CPR and ICU stay complicated by sepsis secondary to Aspiration PNA. Pt s/p Cardiac cath at [facility] revealing multiple vessel disease without any intervention, recommending medical optimization and stabilization prior to PCI. 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     MRI/MRA  EXAM:  MR ANGIO BRAIN                        EXAM:  MR ANGIO NECK                        EXAM:  MR BRAIN                          PROCEDURE DATE:  01/17/2019      INTERPRETATION:  History: Left-sided weakness. V. fib arrest for 20   minutes with CPR..  Description: A noncontrast brain MRI, 3-D time-of-flight brain MRA, 3D   TOF neck MRA, and 2D time of flight neck MRA were performed.  Comparison: Head CT 01/12/2019..    Brain MRI:  Sagittal T1, axial T1, T2, FLAIR, SWI, GRE, and diffusion-weighted series   with ADC maps were performed.  There is no evidence for acute infarct, acute hemorrhage, mass effect, or   hydrocephalus.  A punctate focus of decreased SWI signal involves the right temporal lobe   without surrounding edema which may reflect an old microbleed,   calcification, or a tiny cavernous malformation.  Moderate patchy foci of increased T2 and FLAIR signal are present in the   periventricular and subcortical white matter which most likely represent   chronic microvascular ischemic changes given the patient's age.   Cerebral volume loss is present.    Brain MRA:  There is no evidence for focal stenosis, major vessel occlusion, or   aneurysm. Tiny aneurysms could be beyond the resolution of MRA technique.    Neck MRA:  No significant carotid or vertebral artery stenosis is noted in the neck   by NASCET criteria.    IMPRESSION:  1. On the brain MRI, there is no evidence for acute infarct or acute   hemorrhage. Moderate chronic white matter changes are present.  2. No evidence for significant intracranial or extracranial stenosis on   the MRA studies.    FREDERIC NASH M.D., ATTENDING RADIOLOGIST  This document has been electronically signed. Jan 18 2019  8:52AM 79yo male with hx of CAD with s/p CABG 4v in 1997, multiple stents, HTN, PUD, Gout, DM2, and Acute HFrEF (EF 30-35%), presented to [facility] from [facility] for NSTEMI management after stabilization from complications of V. Fib arrest s/p ROSC after 20mins of CPR and ICU stay complicated by sepsis secondary to Aspiration PNA. Pt s/p Cardiac cath at [facility] revealing multiple vessel disease without any intervention, recommending medical optimization and stabilization prior to PCI. Currently pt undergoing neurological evaluation/workup for suspected hypoxic brain injury from cardiac arrest now plan for cath today

## 2019-01-28 NOTE — PROGRESS NOTE ADULT - ATTENDING COMMENTS
[facility]. [facility]  [phone] wound care for sacral decub  Dr. M. Luke  [facility] Hospitalist  [phone]

## 2019-01-28 NOTE — PROGRESS NOTE ADULT - PROBLEM SELECTOR PLAN 1
- Patient with increasing word finding abilities, visual impairments, L sided weakness   - Likely cause of acute encephalopathy with possible other contributing factors (ICU   delirium etc). Low suspicion for infection  - Cont q4h neurochecks  - Neuro consult appreciated  - Out of bed to chair, frequent reorientation  - Appreciate neuro eval- MR head, MRA head and neck unremarkable  - Appreciate ophthalmology eval- no acute intervention, outpatient f/u  - EEG with diffuse cerebral dysfunction  - PT/OT, speech/swallow f/u- recommending dysphagia 1 diet  - ENT eval appreciated- no significant pathology detected on bedside scope. VC   granuloma seen. No VC paralysis. No thrush.  - Eventual rehab when medically stable, awaiting PCI - Patient with increasing word finding abilities, visual impairments, L sided weakness   - Likely cause of acute encephalopathy with possible other contributing factors (ICU   delirium etc). Low suspicion for infection  - Cont q4h neurochecks  - Neuro consult appreciated  - Out of bed to chair, frequent reorientation  - Appreciate neuro eval- MR head, MRA head and neck unremarkable  - Appreciate ophthalmology eval- no acute intervention, outpatient f/u  - EEG with diffuse cerebral dysfunction  - PT/OT, speech/swallow f/u- recommending dysphagia 1 diet  - ENT eval appreciated- no significant pathology detected on bedside scope. VC   granuloma seen. No VC paralysis. No thrush.  - Eventual rehab when medically stable  cath today

## 2019-01-28 NOTE — CHART NOTE - NSCHARTNOTEFT_GEN_A_CORE
Nutrition Follow Up Note  Patient seen for: removing Morgan and Danactive since pt is on a nectar consistency diet    · Reason for Admission	Cardiac Arrest  77yo male with hx of CAD with s/p CABG 4v in , multiple stents, HTN, PUD, Gout, DM2, and Acute HFrEF (EF 30-35%), presented to [facility] from [facility] for NSTEMI management after stabilization from complications of V. Fib arrest s/p ROSC after 20mins of CPR and ICU stay complicated by sepsis secondary to Aspiration PNA. Pt s/p Cardiac cath at [facility] revealing multiple vessel disease without any intervention, recommending medical optimization and stabilization prior to PCI. 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     Source: chart, NP    Diet : Dysphagia nectar consistency, DASH  Morgan x 2 daily  Danactive x 2 daily          Daily Weight in k.5 (), Weight in k.3 (), Weight in k.3 (), Weight in k (), Weight in k (), Weight in k.3 (), Weight in k.2 ()  % Weight Change: 9% loss since     Pertinent Medications: MEDICATIONS  (STANDING):  allopurinol 300 milliGRAM(s) Oral daily  amiodarone    Tablet 200 milliGRAM(s) Oral daily  ammonium lactate 12% Lotion 1 Application(s) Topical two times a day  aspirin enteric coated 81 milliGRAM(s) Oral daily  atorvastatin 40 milliGRAM(s) Oral at bedtime  Biotene Dry Mouth Oral Rinse 5 milliLiter(s) Swish and Spit four times a day  cholecalciferol 2000 Unit(s) Oral daily  clopidogrel Tablet 75 milliGRAM(s) Oral daily  dextrose 5%. 1000 milliLiter(s) (50 mL/Hr) IV Continuous <Continuous>  dextrose 50% Injectable 12.5 Gram(s) IV Push once  dextrose 50% Injectable 25 Gram(s) IV Push once  dextrose 50% Injectable 25 Gram(s) IV Push once  doxazosin 2 milliGRAM(s) Oral at bedtime  furosemide    Tablet 40 milliGRAM(s) Oral daily  heparin  Injectable 5000 Unit(s) SubCutaneous every 8 hours  insulin lispro (HumaLOG) corrective regimen sliding scale   SubCutaneous three times a day before meals  insulin lispro (HumaLOG) corrective regimen sliding scale   SubCutaneous at bedtime  insulin lispro Injectable (HumaLOG) 2 Unit(s) SubCutaneous three times a day before meals  losartan 50 milliGRAM(s) Oral daily  metoprolol succinate ER 50 milliGRAM(s) Oral daily  pantoprazole   Suspension 40 milliGRAM(s) Oral before breakfast    MEDICATIONS  (PRN):  acetaminophen   Tablet .. 650 milliGRAM(s) Oral every 6 hours PRN Mild Pain (1 - 3), Moderate Pain (4 - 6)  dextrose 40% Gel 15 Gram(s) Oral once PRN Blood Glucose LESS THAN 70 milliGRAM(s)/deciliter  glucagon  Injectable 1 milliGRAM(s) IntraMuscular once PRN Glucose LESS THAN 70 milligrams/deciliter    Pertinent Labs:  @ 07:11: Na 143, BUN 32<H>, Cr 0.90, <H>, K+ 3.6, HgbA1c 7.8  Finger Sticks:  POCT Blood Glucose.: 229 mg/dL ( @ 08:50)  POCT Blood Glucose.: 247 mg/dL ( @ 22:15)  POCT Blood Glucose.: 208 mg/dL ( @ 17:59)  POCT Blood Glucose.: 244 mg/dL ( @ 13:10)      Skin per nursing documentation: DTI sacrum  Edema: +1 generalized    Estimated Needs:   [x ] no change since previous assessment  [ ] recalculated:     Previous Nutrition Diagnosis: increased nutrient needs, protein  Nutrition Diagnosis is: being addressed with supplements and feeding assistance         Interventions:   1)Discussed discontinuing Morgan 2 x daily and Danactive 2 x daily with NP  2)provided diet health shakes with meals      Recommend  1) Recommend Consistent carbohydrate, Dysphagia 1 nectar consistency,DASH  2) Honor pt food preferences  3) Assist with feeding and menu selections as needed  4) recommend multivitamin and VitC      Monitoring and Evaluation:     Continue to monitor Nutritional intake, Tolerance to diet prescription, weights, labs, skin integrity    RD remains available upon request and will follow up per protocol  Letha Matamoros MA, RD, CDN #[phone]

## 2019-01-28 NOTE — PROGRESS NOTE ADULT - PROVIDER SPECIALTY LIST ADULT
CCU
CCU
Cardiology
Hospitalist
Internal Medicine
Neurology
Podiatry
Wound Care
Hospitalist
Internal Medicine
Hospitalist
Hospitalist

## 2019-01-28 NOTE — CHART NOTE - NSCHARTNOTEFT_GEN_A_CORE
Removal of Femoral Sheath    Pulses in the right lower extremity are palpable.   The patient was placed in the supine position. The insertion site was identified and the sutures were removed per protocol.    The __6__ Turkish femoral sheath was then removed.   Direct pressure was applied for  __20____ minutes.   Complications: None, VSS, Good Hemostasis.     Monitoring of the right groin and lower extremity including neuro-vascular checks and vital signs every 15 minutes x 4, then every 30 minutes x 2, then every 1 hour was ordered.      A/P   78M CAD s/p 4v CABG 1997 and multiple PCIs, HTN, PUD, DM2, HFrEF transfer from [facility] after unwitnessed fall with VFib arrest requiring 20 min CPR, extubated on 1/9 to BiPAP with hospital course complicated by aspiration PNA. Transferred to [facility] for LHC, found to have severe flow limiting lesions in Cx and RPDA s/p PCI/MARIA DEL CARMEN x 1 LM 70% and Cx 95% MARIA DEL CARMEN x 1 via RFA with post procedure ECG changes with no reports of chest pain reviewed with Dr. Roberts most likely just RBBB with rate low 100s.      Plan: continue to monitor, Continue ASA, Plavix, Statin,   groin monitoring bedrest per protocol   continue tele   management as per Consult Cardiology and Medicine.    Ana Maria Sue NP-C  Cardiology

## 2019-01-28 NOTE — PROGRESS NOTE ADULT - PROBLEM SELECTOR PLAN 9
DVT PPx SQH  Aspiration precaution- speech/swallow eval   Fall precaution  Planning for [facility] with likely transition to LTC. Pending PCI today. Insurance auth obtained by expires on 1/29. I have explained this to cardiology- it is imperative he gets LHC today or Monday so patient can be discharged by Tuesday. DVT PPx SQH  Aspiration precaution- speech/swallow eval   Fall precaution  Planning for [facility] with likely transition to LTC. Pending PCI today.

## 2019-01-28 NOTE — PROGRESS NOTE ADULT - SUBJECTIVE AND OBJECTIVE BOX
SUBJ:  No acute events. Underwent cardiac cath today with PCI to the LM and LCx without complication. Discharge disposition tomorrow. Denies chest pain and shortness of breath.     MEDICATIONS  (STANDING):  allopurinol 300 milliGRAM(s) Oral daily  amiodarone    Tablet 200 milliGRAM(s) Oral daily  ammonium lactate 12% Lotion 1 Application(s) Topical two times a day  aspirin enteric coated 81 milliGRAM(s) Oral daily  atorvastatin 40 milliGRAM(s) Oral at bedtime  Biotene Dry Mouth Oral Rinse 5 milliLiter(s) Swish and Spit four times a day  cholecalciferol 2000 Unit(s) Oral daily  clopidogrel Tablet 75 milliGRAM(s) Oral daily  dextrose 5%. 1000 milliLiter(s) (50 mL/Hr) IV Continuous <Continuous>  dextrose 50% Injectable 12.5 Gram(s) IV Push once  dextrose 50% Injectable 25 Gram(s) IV Push once  dextrose 50% Injectable 25 Gram(s) IV Push once  doxazosin 2 milliGRAM(s) Oral at bedtime  furosemide    Tablet 40 milliGRAM(s) Oral daily  heparin  Injectable 5000 Unit(s) SubCutaneous every 8 hours  insulin lispro (HumaLOG) corrective regimen sliding scale   SubCutaneous three times a day before meals  insulin lispro (HumaLOG) corrective regimen sliding scale   SubCutaneous at bedtime  insulin lispro Injectable (HumaLOG) 2 Unit(s) SubCutaneous three times a day before meals  losartan 50 milliGRAM(s) Oral daily  pantoprazole   Suspension 40 milliGRAM(s) Oral before breakfast    MEDICATIONS  (PRN):  acetaminophen   Tablet .. 650 milliGRAM(s) Oral every 6 hours PRN Mild Pain (1 - 3), Moderate Pain (4 - 6)  dextrose 40% Gel 15 Gram(s) Oral once PRN Blood Glucose LESS THAN 70 milliGRAM(s)/deciliter  glucagon  Injectable 1 milliGRAM(s) IntraMuscular once PRN Glucose LESS THAN 70 milligrams/deciliter    Vital Signs Last 24 Hrs  T(C): 37.3 (28 Jan 2019 21:14), Max: 37.3 (28 Jan 2019 21:14)  T(F): 99.1 (28 Jan 2019 21:14), Max: 99.1 (28 Jan 2019 21:14)  HR: 106 (28 Jan 2019 22:14) (96 - 108)  BP: 105/67 (28 Jan 2019 22:14) (98/61 - 122/63)  RR: 18 (28 Jan 2019 22:14) (18 - 18)  SpO2: 95% (28 Jan 2019 22:14) (94% - 97%)    REVIEW OF SYSTEMS:  Limited due to hypoxic brain injury.     PHYSICAL EXAM:  · CONSTITUTIONAL: Well-developed  · EYES: no drainage or redness  · NECK: supple  ·RESPIRATORY: reduced breath sounds  · CARDIOVASCULAR: regular rate and rhythm  . GASTROINTESTINAL:  no distention; no masses palpable  · EXTREMITIES: No cyanosis, clubbing. Positive edema L>R  · VASCULAR:  Equal and normal pulses (radial)  ·NEUROLOGICAL:  Alert and oriented x 2  · SKIN: No lesions; no rash  . LYMPH NODES: No lymphadedenopathy  · MUSCULOSKELETAL:  No calf tenderness    TTE 1/16/2019  Conclusions:  1. Moderate-severe mitral regurgitation.  2. Calcified trileaflet aortic valve with decreased  opening. Peak transaortic valve gradient equals 27 mm Hg,  mean transaortic valve gradient equals 17 mm Hg, estimated  aortic valve area equals 1.3 sqcm (by continuity equation),  aortic valve velocity time integral equals 58 cm,  consistent with moderate aortic stenosis. Mild aortic  regurgitation.  3. Moderate concentric left ventricular hypertrophy.  4. Endocardial visualization enhanced with intravenous  injection of Ultrasonic Enhancing Agent (Definity). Normal  left ventricular systolic function.  5. Mild diastolic dysfunction (Stage I).  6. Normal right ventricular size and function.  7. Estimated pulmonary artery systolic pressure equals 42  mm Hg, assuming right atrial pressure equals 8 mm Hg,  consistent with mild pulmonary pressures.  *** No previous Echo exam.    LABS:   CBC Full  -  ( 28 Jan 2019 07:13 )  WBC Count : 4.9 K/uL  Hemoglobin : 12.1 g/dL  Hematocrit : 35.2 %  Platelet Count - Automated : 89 K/uL  Mean Cell Volume : 101.0 fl  Mean Cell Hemoglobin : 34.5 pg  Mean Cell Hemoglobin Concentration : 34.3 gm/dL    01-28    143  |  103  |  32<H>  ----------------------------<  241<H>  3.6   |  29  |  0.90    Ca    9.1      28 Jan 2019 07:11  Phos  3.4     01-27  Mg     1.8     01-27    IMPRESSION AND PLAN:  78M CAD s/p 4v CABG 1997 and multiple PCIs, HTN, PUD, DM2, HFrEF transfer from [facility] after unwitnessed fall with VFib arrest requiring 20 min CPR, extubated on 1/9 to BiPAP with hospital course complicated by aspiration PNA. Transferred to [facility] for [facility], found to have severe flow limiting lesions in Cx and RPDA, planned for revascularization after medication optimization and compensation of CHF.    1) Arrest/NSTEMI  Preserved EF, moderate AS, moderate LVH.   Cath with LCx and RPDA; intervention delayed until optimized   PCI 1/28/2019 to LM and LCx without complication.     ·	Continue medical management with aspirin 81 mg daily, clopidogrel 75 mg daily, atorvastatin 40 mg nightly, metoprolol succinate 75 mg daily, losartan 50 mg daily, furosemide 40 mg daily.   ·	No ICD indicated at this time per EP. Reversible cause of VF (Obstructive CAD; addressed)  ·	Discharge disposition. No further testing at this time. Rehab.    Eddie Morgan MD, MPH, GABY  Cardiovascular Specialist Attending  [facility]  C: [phone]  E: [email or web address]  (Cardiology nocturnist available every night 7 pm - 7 am; available week days for follow-up; general cardiology consult coverage weekend days)

## 2019-01-28 NOTE — CHART NOTE - NSCHARTNOTEFT_GEN_A_CORE
Patient underwent a PCI procedure and is being admitted as they are at increased risk for major adverse cardiac and vascular events if discharged due to the following high risk characteristics: NSTEMI, PCI MARIA DEL CARMEN to LM and Cx

## 2019-01-28 NOTE — PROGRESS NOTE ADULT - REASON FOR ADMISSION
VF
VF arrest
VF/CAD
cardiac arrest
Cardiac arrest
ED
arrest/nstemi
VF in the setting of unrevascularized coronary disease
cardiac arrest, cad
cardiac arrest
NSTEMI
Cardiac Arrest
VT arrest, CAD
cardiac arrest.
Transfer from [facility] for cardiac management
Transfer from [facility] for cardiac management
cardiac arrest, CAD
cad
cardiac arrest/cad

## 2019-01-28 NOTE — PROGRESS NOTE ADULT - SUBJECTIVE AND OBJECTIVE BOX
Patient is a 78y old  Male who presents with a chief complaint of Cardiac Arrest (2019 11:45)        SUBJECTIVE / OVERNIGHT EVENTS:     MEDICATIONS  (STANDING):  allopurinol 300 milliGRAM(s) Oral daily  amiodarone    Tablet 200 milliGRAM(s) Oral daily  ammonium lactate 12% Lotion 1 Application(s) Topical two times a day  aspirin enteric coated 81 milliGRAM(s) Oral daily  atorvastatin 40 milliGRAM(s) Oral at bedtime  Biotene Dry Mouth Oral Rinse 5 milliLiter(s) Swish and Spit four times a day  cholecalciferol 2000 Unit(s) Oral daily  clopidogrel Tablet 75 milliGRAM(s) Oral daily  dextrose 5%. 1000 milliLiter(s) (50 mL/Hr) IV Continuous <Continuous>  dextrose 50% Injectable 12.5 Gram(s) IV Push once  dextrose 50% Injectable 25 Gram(s) IV Push once  dextrose 50% Injectable 25 Gram(s) IV Push once  doxazosin 2 milliGRAM(s) Oral at bedtime  furosemide    Tablet 40 milliGRAM(s) Oral daily  heparin  Injectable 5000 Unit(s) SubCutaneous every 8 hours  insulin lispro (HumaLOG) corrective regimen sliding scale   SubCutaneous three times a day before meals  insulin lispro (HumaLOG) corrective regimen sliding scale   SubCutaneous at bedtime  insulin lispro Injectable (HumaLOG) 2 Unit(s) SubCutaneous three times a day before meals  losartan 50 milliGRAM(s) Oral daily  metoprolol succinate ER 50 milliGRAM(s) Oral daily  pantoprazole   Suspension 40 milliGRAM(s) Oral before breakfast    MEDICATIONS  (PRN):  acetaminophen   Tablet .. 650 milliGRAM(s) Oral every 6 hours PRN Mild Pain (1 - 3), Moderate Pain (4 - 6)  dextrose 40% Gel 15 Gram(s) Oral once PRN Blood Glucose LESS THAN 70 milliGRAM(s)/deciliter  glucagon  Injectable 1 milliGRAM(s) IntraMuscular once PRN Glucose LESS THAN 70 milligrams/deciliter      Vital Signs Last 24 Hrs  T(C): 36.3 (2019 04:19), Max: 37.1 (2019 20:49)  T(F): 97.4 (2019 04:19), Max: 98.8 (2019 20:49)  HR: 99 (2019 04:19) (99 - 101)  BP: 122/63 (2019 04:19) (116/70 - 122/63)  BP(mean): --  RR: 18 (2019 04:19) (18 - 18)  SpO2: 94% (2019 04:19) (94% - 97%)  CAPILLARY BLOOD GLUCOSE      POCT Blood Glucose.: 229 mg/dL (2019 08:50)  POCT Blood Glucose.: 247 mg/dL (2019 22:15)  POCT Blood Glucose.: 208 mg/dL (2019 17:59)    I&O's Summary    2019 07:  -  2019 07:00  --------------------------------------------------------  IN: 710 mL / OUT: 0 mL / NET: 710 mL    2019 07:01  -  2019 15:40  --------------------------------------------------------  IN: 236 mL / OUT: 0 mL / NET: 236 mL        PHYSICAL EXAM:  GENERAL: NAD  HEAD:  Atraumatic, Normocephalic  EYES: conjunctiva and sclera clear  NECK: No JVD  CHEST/LUNG: CTA b/l  HEART: S1 S2 RRR  ABDOMEN: +BS Soft, NT/ND  EXTREMITIES:  2+ DP Pulses, No c/c/e  NEUROLOGY: AAOx3, no focal deficits   SKIN: No rashes or lesions    LABS:                        12.1   4.9   )-----------( 89       ( 2019 07:13 )             35.2     -    143  |  103  |  32<H>  ----------------------------<  241<H>  3.6   |  29  |  0.90    Ca    9.1      2019 07:11  Phos  3.4       Mg     1.8                 Urinalysis Basic - ( 2019 18:41 )    Color: Yellow / Appearance: Clear / S.019 / pH: x  Gluc: x / Ketone: Negative  / Bili: Negative / Urobili: 2 mg/dL   Blood: x / Protein: Trace / Nitrite: Negative   Leuk Esterase: Negative / RBC: 1 /hpf / WBC 2 /HPF   Sq Epi: x / Non Sq Epi: 1 /hpf / Bacteria: Moderate        RADIOLOGY & ADDITIONAL TESTS:    Imaging Personally Reviewed:  Consultant(s) Notes Reviewed:    Care Discussed with Consultants/Other Providers: Patient is a 78y old  Male who presents with a chief complaint of Cardiac Arrest (2019 11:45)        SUBJECTIVE / OVERNIGHT EVENTS: no acute complaints    MEDICATIONS  (STANDING):  allopurinol 300 milliGRAM(s) Oral daily  amiodarone    Tablet 200 milliGRAM(s) Oral daily  ammonium lactate 12% Lotion 1 Application(s) Topical two times a day  aspirin enteric coated 81 milliGRAM(s) Oral daily  atorvastatin 40 milliGRAM(s) Oral at bedtime  Biotene Dry Mouth Oral Rinse 5 milliLiter(s) Swish and Spit four times a day  cholecalciferol 2000 Unit(s) Oral daily  clopidogrel Tablet 75 milliGRAM(s) Oral daily  dextrose 5%. 1000 milliLiter(s) (50 mL/Hr) IV Continuous <Continuous>  dextrose 50% Injectable 12.5 Gram(s) IV Push once  dextrose 50% Injectable 25 Gram(s) IV Push once  dextrose 50% Injectable 25 Gram(s) IV Push once  doxazosin 2 milliGRAM(s) Oral at bedtime  furosemide    Tablet 40 milliGRAM(s) Oral daily  heparin  Injectable 5000 Unit(s) SubCutaneous every 8 hours  insulin lispro (HumaLOG) corrective regimen sliding scale   SubCutaneous three times a day before meals  insulin lispro (HumaLOG) corrective regimen sliding scale   SubCutaneous at bedtime  insulin lispro Injectable (HumaLOG) 2 Unit(s) SubCutaneous three times a day before meals  losartan 50 milliGRAM(s) Oral daily  metoprolol succinate ER 50 milliGRAM(s) Oral daily  pantoprazole   Suspension 40 milliGRAM(s) Oral before breakfast    MEDICATIONS  (PRN):  acetaminophen   Tablet .. 650 milliGRAM(s) Oral every 6 hours PRN Mild Pain (1 - 3), Moderate Pain (4 - 6)  dextrose 40% Gel 15 Gram(s) Oral once PRN Blood Glucose LESS THAN 70 milliGRAM(s)/deciliter  glucagon  Injectable 1 milliGRAM(s) IntraMuscular once PRN Glucose LESS THAN 70 milligrams/deciliter      Vital Signs Last 24 Hrs  T(C): 36.3 (2019 04:19), Max: 37.1 (2019 20:49)  T(F): 97.4 (2019 04:19), Max: 98.8 (2019 20:49)  HR: 99 (2019 04:19) (99 - 101)  BP: 122/63 (2019 04:19) (116/70 - 122/63)  BP(mean): --  RR: 18 (2019 04:19) (18 - 18)  SpO2: 94% (2019 04:19) (94% - 97%)  CAPILLARY BLOOD GLUCOSE      POCT Blood Glucose.: 229 mg/dL (2019 08:50)  POCT Blood Glucose.: 247 mg/dL (2019 22:15)  POCT Blood Glucose.: 208 mg/dL (2019 17:59)    I&O's Summary    2019 07:  -  2019 07:00  --------------------------------------------------------  IN: 710 mL / OUT: 0 mL / NET: 710 mL    2019 07:01  -  2019 15:40  --------------------------------------------------------  IN: 236 mL / OUT: 0 mL / NET: 236 mL        PHYSICAL EXAM:  GENERAL: NAD  EYES: conjunctiva and sclera clear  NECK: No JVD  CHEST/LUNG: CTA b/l  HEART: S1 S2 RRR  ABDOMEN: +BS Soft, NT/ND  EXTREMITIES:  2+ DP Pulses, No c/c. No edema  NEUROLOGY: AAOx3, no focal deficits   SKIN: sacral decub    LABS:                        12.1   4.9   )-----------( 89       ( 2019 07:13 )             35.2         143  |  103  |  32<H>  ----------------------------<  241<H>  3.6   |  29  |  0.90    Ca    9.1      2019 07:11  Phos  3.4       Mg     1.8                 Urinalysis Basic - ( 2019 18:41 )    Color: Yellow / Appearance: Clear / S.019 / pH: x  Gluc: x / Ketone: Negative  / Bili: Negative / Urobili: 2 mg/dL   Blood: x / Protein: Trace / Nitrite: Negative   Leuk Esterase: Negative / RBC: 1 /hpf / WBC 2 /HPF   Sq Epi: x / Non Sq Epi: 1 /hpf / Bacteria: Moderate        RADIOLOGY & ADDITIONAL TESTS:    Imaging Personally Reviewed:  Consultant(s) Notes Reviewed:    Care Discussed with Consultants/Other Providers:

## 2019-01-29 ENCOUNTER — TRANSCRIPTION ENCOUNTER (OUTPATIENT)
Age: 79
End: 2019-01-29

## 2019-01-29 VITALS — WEIGHT: 185.19 LBS

## 2019-01-29 LAB
ANION GAP SERPL CALC-SCNC: 14 MMOL/L — SIGNIFICANT CHANGE UP (ref 5–17)
BUN SERPL-MCNC: 32 MG/DL — HIGH (ref 7–23)
CALCIUM SERPL-MCNC: 8.9 MG/DL — SIGNIFICANT CHANGE UP (ref 8.4–10.5)
CHLORIDE SERPL-SCNC: 104 MMOL/L — SIGNIFICANT CHANGE UP (ref 96–108)
CO2 SERPL-SCNC: 27 MMOL/L — SIGNIFICANT CHANGE UP (ref 22–31)
CREAT SERPL-MCNC: 0.85 MG/DL — SIGNIFICANT CHANGE UP (ref 0.5–1.3)
GLUCOSE BLDC GLUCOMTR-MCNC: 199 MG/DL — HIGH (ref 70–99)
GLUCOSE BLDC GLUCOMTR-MCNC: 271 MG/DL — HIGH (ref 70–99)
GLUCOSE BLDC GLUCOMTR-MCNC: 284 MG/DL — HIGH (ref 70–99)
GLUCOSE SERPL-MCNC: 255 MG/DL — HIGH (ref 70–99)
HCT VFR BLD CALC: 36 % — LOW (ref 39–50)
HGB BLD-MCNC: 12.3 G/DL — LOW (ref 13–17)
MAGNESIUM SERPL-MCNC: 1.9 MG/DL — SIGNIFICANT CHANGE UP (ref 1.6–2.6)
MCHC RBC-ENTMCNC: 34.2 GM/DL — SIGNIFICANT CHANGE UP (ref 32–36)
MCHC RBC-ENTMCNC: 34.3 PG — HIGH (ref 27–34)
MCV RBC AUTO: 100 FL — SIGNIFICANT CHANGE UP (ref 80–100)
PLATELET # BLD AUTO: 87 K/UL — LOW (ref 150–400)
POTASSIUM SERPL-MCNC: 3.3 MMOL/L — LOW (ref 3.5–5.3)
POTASSIUM SERPL-SCNC: 3.3 MMOL/L — LOW (ref 3.5–5.3)
RBC # BLD: 3.59 M/UL — LOW (ref 4.2–5.8)
RBC # FLD: 13.6 % — SIGNIFICANT CHANGE UP (ref 10.3–14.5)
SODIUM SERPL-SCNC: 145 MMOL/L — SIGNIFICANT CHANGE UP (ref 135–145)
WBC # BLD: 6.5 K/UL — SIGNIFICANT CHANGE UP (ref 3.8–10.5)
WBC # FLD AUTO: 6.5 K/UL — SIGNIFICANT CHANGE UP (ref 3.8–10.5)

## 2019-01-29 PROCEDURE — 86900 BLOOD TYPING SEROLOGIC ABO: CPT

## 2019-01-29 PROCEDURE — 99239 HOSP IP/OBS DSCHRG MGMT >30: CPT

## 2019-01-29 PROCEDURE — C1894: CPT

## 2019-01-29 PROCEDURE — 82607 VITAMIN B-12: CPT

## 2019-01-29 PROCEDURE — C1769: CPT

## 2019-01-29 PROCEDURE — 82550 ASSAY OF CK (CPK): CPT

## 2019-01-29 PROCEDURE — 84443 ASSAY THYROID STIM HORMONE: CPT

## 2019-01-29 PROCEDURE — 97166 OT EVAL MOD COMPLEX 45 MIN: CPT

## 2019-01-29 PROCEDURE — 80048 BASIC METABOLIC PNL TOTAL CA: CPT

## 2019-01-29 PROCEDURE — 99153 MOD SED SAME PHYS/QHP EA: CPT

## 2019-01-29 PROCEDURE — C9606: CPT | Mod: LC

## 2019-01-29 PROCEDURE — C1725: CPT

## 2019-01-29 PROCEDURE — 84295 ASSAY OF SERUM SODIUM: CPT

## 2019-01-29 PROCEDURE — 84100 ASSAY OF PHOSPHORUS: CPT

## 2019-01-29 PROCEDURE — 82746 ASSAY OF FOLIC ACID SERUM: CPT

## 2019-01-29 PROCEDURE — 85014 HEMATOCRIT: CPT

## 2019-01-29 PROCEDURE — 86850 RBC ANTIBODY SCREEN: CPT

## 2019-01-29 PROCEDURE — 82947 ASSAY GLUCOSE BLOOD QUANT: CPT

## 2019-01-29 PROCEDURE — C1760: CPT

## 2019-01-29 PROCEDURE — C8929: CPT

## 2019-01-29 PROCEDURE — 82962 GLUCOSE BLOOD TEST: CPT

## 2019-01-29 PROCEDURE — 83921 ORGANIC ACID SINGLE QUANT: CPT

## 2019-01-29 PROCEDURE — 82330 ASSAY OF CALCIUM: CPT

## 2019-01-29 PROCEDURE — 84484 ASSAY OF TROPONIN QUANT: CPT

## 2019-01-29 PROCEDURE — 83880 ASSAY OF NATRIURETIC PEPTIDE: CPT

## 2019-01-29 PROCEDURE — 94660 CPAP INITIATION&MGMT: CPT

## 2019-01-29 PROCEDURE — 70547 MR ANGIOGRAPHY NECK W/O DYE: CPT

## 2019-01-29 PROCEDURE — 82553 CREATINE MB FRACTION: CPT

## 2019-01-29 PROCEDURE — 81001 URINALYSIS AUTO W/SCOPE: CPT

## 2019-01-29 PROCEDURE — 76937 US GUIDE VASCULAR ACCESS: CPT

## 2019-01-29 PROCEDURE — 70544 MR ANGIOGRAPHY HEAD W/O DYE: CPT

## 2019-01-29 PROCEDURE — 92611 MOTION FLUOROSCOPY/SWALLOW: CPT

## 2019-01-29 PROCEDURE — C1885: CPT

## 2019-01-29 PROCEDURE — 85027 COMPLETE CBC AUTOMATED: CPT

## 2019-01-29 PROCEDURE — 97163 PT EVAL HIGH COMPLEX 45 MIN: CPT

## 2019-01-29 PROCEDURE — 70450 CT HEAD/BRAIN W/O DYE: CPT

## 2019-01-29 PROCEDURE — 93971 EXTREMITY STUDY: CPT

## 2019-01-29 PROCEDURE — 83605 ASSAY OF LACTIC ACID: CPT

## 2019-01-29 PROCEDURE — 84145 PROCALCITONIN (PCT): CPT

## 2019-01-29 PROCEDURE — 70551 MRI BRAIN STEM W/O DYE: CPT

## 2019-01-29 PROCEDURE — 80061 LIPID PANEL: CPT

## 2019-01-29 PROCEDURE — 85610 PROTHROMBIN TIME: CPT

## 2019-01-29 PROCEDURE — 92610 EVALUATE SWALLOWING FUNCTION: CPT

## 2019-01-29 PROCEDURE — 80053 COMPREHEN METABOLIC PANEL: CPT

## 2019-01-29 PROCEDURE — C9600: CPT | Mod: LM

## 2019-01-29 PROCEDURE — 84132 ASSAY OF SERUM POTASSIUM: CPT

## 2019-01-29 PROCEDURE — 71045 X-RAY EXAM CHEST 1 VIEW: CPT

## 2019-01-29 PROCEDURE — 87040 BLOOD CULTURE FOR BACTERIA: CPT

## 2019-01-29 PROCEDURE — 83735 ASSAY OF MAGNESIUM: CPT

## 2019-01-29 PROCEDURE — 99152 MOD SED SAME PHYS/QHP 5/>YRS: CPT

## 2019-01-29 PROCEDURE — 82435 ASSAY OF BLOOD CHLORIDE: CPT

## 2019-01-29 PROCEDURE — 97110 THERAPEUTIC EXERCISES: CPT

## 2019-01-29 PROCEDURE — 85730 THROMBOPLASTIN TIME PARTIAL: CPT

## 2019-01-29 PROCEDURE — 74230 X-RAY XM SWLNG FUNCJ C+: CPT

## 2019-01-29 PROCEDURE — C1874: CPT

## 2019-01-29 PROCEDURE — 93005 ELECTROCARDIOGRAM TRACING: CPT

## 2019-01-29 PROCEDURE — 97112 NEUROMUSCULAR REEDUCATION: CPT

## 2019-01-29 PROCEDURE — 93459 L HRT ART/GRFT ANGIO: CPT

## 2019-01-29 PROCEDURE — 86901 BLOOD TYPING SEROLOGIC RH(D): CPT

## 2019-01-29 PROCEDURE — 95819 EEG AWAKE AND ASLEEP: CPT

## 2019-01-29 PROCEDURE — 83036 HEMOGLOBIN GLYCOSYLATED A1C: CPT

## 2019-01-29 PROCEDURE — C1887: CPT

## 2019-01-29 PROCEDURE — 97530 THERAPEUTIC ACTIVITIES: CPT

## 2019-01-29 PROCEDURE — 82803 BLOOD GASES ANY COMBINATION: CPT

## 2019-01-29 RX ORDER — FUROSEMIDE 40 MG
1 TABLET ORAL
Qty: 0 | Refills: 0 | COMMUNITY
Start: 2019-01-29

## 2019-01-29 RX ORDER — POTASSIUM CHLORIDE 20 MEQ
40 PACKET (EA) ORAL ONCE
Qty: 0 | Refills: 0 | Status: DISCONTINUED | OUTPATIENT
Start: 2019-01-29 | End: 2019-01-29

## 2019-01-29 RX ORDER — POTASSIUM CHLORIDE 20 MEQ
40 PACKET (EA) ORAL ONCE
Qty: 0 | Refills: 0 | Status: COMPLETED | OUTPATIENT
Start: 2019-01-29 | End: 2019-01-29

## 2019-01-29 RX ORDER — METOPROLOL TARTRATE 50 MG
1 TABLET ORAL
Qty: 0 | Refills: 0 | COMMUNITY

## 2019-01-29 RX ORDER — OXYCODONE HYDROCHLORIDE 5 MG/1
2.5 TABLET ORAL EVERY 6 HOURS
Qty: 0 | Refills: 0 | Status: DISCONTINUED | OUTPATIENT
Start: 2019-01-29 | End: 2019-01-29

## 2019-01-29 RX ORDER — SALIVA SUBSTITUTE COMB NO.11 351 MG
5 POWDER IN PACKET (EA) MUCOUS MEMBRANE
Qty: 0 | Refills: 0 | COMMUNITY
Start: 2019-01-29

## 2019-01-29 RX ORDER — METOPROLOL TARTRATE 50 MG
50 TABLET ORAL ONCE
Qty: 0 | Refills: 0 | Status: COMPLETED | OUTPATIENT
Start: 2019-01-29 | End: 2019-01-29

## 2019-01-29 RX ORDER — METOPROLOL TARTRATE 50 MG
1 TABLET ORAL
Qty: 0 | Refills: 0 | COMMUNITY
Start: 2019-01-29

## 2019-01-29 RX ORDER — DOXAZOSIN MESYLATE 4 MG
1 TABLET ORAL
Qty: 0 | Refills: 0 | COMMUNITY
Start: 2019-01-29

## 2019-01-29 RX ORDER — METOPROLOL TARTRATE 50 MG
25 TABLET ORAL ONCE
Qty: 0 | Refills: 0 | Status: DISCONTINUED | OUTPATIENT
Start: 2019-01-29 | End: 2019-01-29

## 2019-01-29 RX ORDER — OXYCODONE HYDROCHLORIDE 5 MG/1
0.5 TABLET ORAL
Qty: 0 | Refills: 0 | COMMUNITY

## 2019-01-29 RX ORDER — OXYCODONE HYDROCHLORIDE 5 MG/1
1 TABLET ORAL
Qty: 0 | Refills: 0 | COMMUNITY

## 2019-01-29 RX ORDER — METOPROLOL TARTRATE 50 MG
75 TABLET ORAL
Qty: 0 | Refills: 0 | Status: DISCONTINUED | OUTPATIENT
Start: 2019-01-29 | End: 2019-01-29

## 2019-01-29 RX ORDER — FUROSEMIDE 40 MG
1 TABLET ORAL
Qty: 0 | Refills: 0 | COMMUNITY

## 2019-01-29 RX ORDER — SOD,AMMONIUM,POTASSIUM LACTATE
1 CREAM (GRAM) TOPICAL
Qty: 0 | Refills: 0 | COMMUNITY
Start: 2019-01-29

## 2019-01-29 RX ADMIN — Medication 81 MILLIGRAM(S): at 10:28

## 2019-01-29 RX ADMIN — Medication 2 UNIT(S): at 09:39

## 2019-01-29 RX ADMIN — Medication 2 UNIT(S): at 13:10

## 2019-01-29 RX ADMIN — Medication 40 MILLIGRAM(S): at 05:30

## 2019-01-29 RX ADMIN — Medication 5 MILLILITER(S): at 05:31

## 2019-01-29 RX ADMIN — Medication 6: at 13:10

## 2019-01-29 RX ADMIN — LOSARTAN POTASSIUM 50 MILLIGRAM(S): 100 TABLET, FILM COATED ORAL at 09:38

## 2019-01-29 RX ADMIN — Medication 1 APPLICATION(S): at 05:29

## 2019-01-29 RX ADMIN — AMIODARONE HYDROCHLORIDE 200 MILLIGRAM(S): 400 TABLET ORAL at 05:30

## 2019-01-29 RX ADMIN — PANTOPRAZOLE SODIUM 40 MILLIGRAM(S): 20 TABLET, DELAYED RELEASE ORAL at 05:30

## 2019-01-29 RX ADMIN — Medication 6: at 09:38

## 2019-01-29 RX ADMIN — Medication 75 MILLIGRAM(S): at 05:30

## 2019-01-29 RX ADMIN — Medication 50 MILLIGRAM(S): at 11:44

## 2019-01-29 RX ADMIN — CLOPIDOGREL BISULFATE 75 MILLIGRAM(S): 75 TABLET, FILM COATED ORAL at 10:29

## 2019-01-29 RX ADMIN — HEPARIN SODIUM 5000 UNIT(S): 5000 INJECTION INTRAVENOUS; SUBCUTANEOUS at 13:10

## 2019-01-29 RX ADMIN — Medication 2000 UNIT(S): at 10:28

## 2019-01-29 RX ADMIN — Medication 5 MILLILITER(S): at 11:44

## 2019-01-29 RX ADMIN — Medication 40 MILLIEQUIVALENT(S): at 10:28

## 2019-01-29 RX ADMIN — HEPARIN SODIUM 5000 UNIT(S): 5000 INJECTION INTRAVENOUS; SUBCUTANEOUS at 05:30

## 2019-01-29 RX ADMIN — Medication 300 MILLIGRAM(S): at 10:28

## 2019-01-29 NOTE — DISCHARGE NOTE ADULT - CARE PLAN
Principal Discharge DX:	Anoxic brain injury  Goal:	Resolved.  Assessment and plan of treatment:	Monitor neuro checks.   [facility] pt to person, place  and time.  Secondary Diagnosis:	NSTEMI (non-ST elevated myocardial infarction)  Goal:	Resolved.  Assessment and plan of treatment:	Coronary artery disease is a condition where the arteries the supply the heart muscle get clogges with fatty deposits & puts you at risk for a heart attack  Call your doctor if you have any new pain, pressure, or discomfort in the center of your chest, pain, tingling or discomfort in arms, back, neck, jaw, or stomach, shortness of breath, nausea, vomiting, burping or heartburn, sweating, cold and clammy skin, racing or abnormal heartbeat for more than 10 minutes or if they keep coming & going.  Call 911 and do not tr to get to hospital by care  You can help yourself with lefestyle changes (quitting smoking if you smoke), eat lots of fruits & vegetables & low fat dairy products, not a lot of meat & fatty foods, walk or some form of physical activity most days of the week, lose weight if you are overweight  Take your cardiac medication as prescribed to lower cholesterol, to lower blood pressure, aspirin to prevent blood clots, and diabetes control  Make sure to keep appointments with doctor for cardiac follow up care  Secondary Diagnosis:	Acute on chronic combined systolic and diastolic congestive heart failure  Goal:	Stable  Assessment and plan of treatment:	Weigh yourself daily.  If you gain 3lbs in 3 days, or 5lbs in a week call your Health Care Provider.  Do not eat or drink foods containing more than 2000mg of salt (sodium) in your diet every day.  Call your Health Care Provider if you have any swelling or increased swelling in your feet, ankles, and/or stomach.  Take all of your medication as directed.  If you become dizzy call your Health Care Provider.  Secondary Diagnosis:	CAD (coronary artery disease)  Goal:	Stable  Assessment and plan of treatment:	Coronary artery disease is a condition where the arteries the supply the heart muscle get clogges with fatty deposits & puts you at risk for a heart attack  Call your doctor if you have any new pain, pressure, or discomfort in the center of your chest, pain, tingling or discomfort in arms, back, neck, jaw, or stomach, shortness of breath, nausea, vomiting, burping or heartburn, sweating, cold and clammy skin, racing or abnormal heartbeat for more than 10 minutes or if they keep coming & going.  Call 911 and do not tr to get to hospital by care  You can help yourself with lefestyle changes (quitting smoking if you smoke), eat lots of fruits & vegetables & low fat dairy products, not a lot of meat & fatty foods, walk or some form of physical activity most days of the week, lose weight if you are overweight  Take your cardiac medication as prescribed to lower cholesterol, to lower blood pressure, aspirin to prevent blood clots, and diabetes control  Make sure to keep appointments with doctor for cardiac follow up care  Secondary Diagnosis:	Macrocytic anemia  Goal:	Stable  Assessment and plan of treatment:	Follow up with PMD in 1  week after discharge from Rehab.  Secondary Diagnosis:	DM (diabetes mellitus)  Goal:	Will continue to be controlled.  Assessment and plan of treatment:	HgA1C this admission.  Make sure you get your HgA1c checked every three months.  If you take oral diabetes medications, check your blood glucose two times a day.  If you take insulin, check your blood glucose before meals and at bedtime.  It's important not to skip any meals.  Keep a log of your blood glucose results and always take it with you to your doctor appointments.  Keep a list of your current medications including injectables and over the counter medications and bring this medication list with you to all your doctor appointments.  If you have not seen your ophthalmologist this year call for appointment.  Check your feet daily for redness, sores, or openings. Do not self treat. If no improvement in two days call your primary care physician for an appointment.  Low blood sugar (hypoglycemia) is a blood sugar below 70mg/dl. Check your blood sugar if you feel signs/symptoms of hypoglycemia. If your blood sugar is below 70 take 15 grams of carbohydrates (ex 4 oz of apple juice, 3-4 glucose tablets, or 4-6 oz of regular soda) wait 15 minutes and repeat blood sugar to make sure it comes up above 70.  If your blood sugar is above 70 and you are due for a meal, have a meal.  If you are not due for a meal have a snack.  This snack helps keeps your blood sugar at a safe range.  Secondary Diagnosis:	HTN (hypertension)  Goal:	Will continue to be well controlled.  Assessment and plan of treatment:	Low salt diet  Activity as tolerated.  Take all medication as prescribed.  Follow up with your medical doctor for routine blood pressure monitoring at your next visit.  Notify your doctor if you have any of the following symptoms:   Dizziness, Lightheadedness, Blurry vision, Headache, Chest pain, Shortness of breath

## 2019-01-29 NOTE — DISCHARGE NOTE ADULT - ADDITIONAL INSTRUCTIONS
WOUND CARE INSTRUCTIONS:   Sacral evolving DTI w/ partial thickness skin loss- TRIAD paste  Lt heel resolving DTI - Cavilon  bilateral LE elevation  Moisturize intact skin w/ SWEEN cream BID  con't Offloading   con't [facility] WOUND CARE INSTRUCTIONS:   Sacral evolving DTI w/ partial thickness skin loss- TRIAD paste  Lt heel resolving DTI - Cavilon  bilateral LE elevation  Moisturize intact skin w/ SWEEN cream BID  con't Offloading   con't [facility]    patient should continue OT and PT in Rehab

## 2019-01-29 NOTE — DISCHARGE NOTE ADULT - PATIENT PORTAL LINK FT
You can access the [facility] Patient Portal, offered by [facility], by registering with the following website: [email or web address]

## 2019-01-29 NOTE — DISCHARGE NOTE ADULT - HOSPITAL COURSE
To be done.. to be done 79yo male with hx of CAD with s/p CABG 4v in 1997, multiple stents, HTN, PUD, Gout, DM2, and Acute HFrEF (EF 30-35%), presented to [facility] from [facility] for NSTEMI management after stabilization from complications of V. Fib arrest s/p ROSC after 20mins of CPR and ICU stay complicated by sepsis secondary to Aspiration PNA. Pt s/p Cardiac cath at [facility] revealing multiple vessel disease without any intervention, recommending medical optimization and stabilization prior to PCI. patient had Cath 1/28 s/p Successful PCI to the LM-LCx with MARIA DEL CARMEN after laser atherectomy. The RPDA was re-imaged and appeared less stenotic and smooth (as opposed to the ulcerated appearance previously) per cath report. post cath, patient had widening of QRS and EKG reviewed with Dr. Roberts thought related to rate and RBBB. TTE with EF 65% with severe MR. patient had suspected anoxic brain injury with increasing word finding abilities, visual impairments, L sided weakness likely due to cardiac arrest. patient evaluated by neurology. MR head, MRA head and neck unremarkable. EEG without seizure. patient had dysphagia, evaluated by ENT s/p laryngoscopy without VC paralysis. patient recommended dysphagia 1 diet with nectar thick liquids. thrombocytopenia stable. no s/s of bleed. FS elevated with hga1c 7.8 and started on premeal insulin.     patient evaluated by PT and recommended for [facility]. patient likely transition to LTC.

## 2019-01-29 NOTE — DISCHARGE NOTE ADULT - PLAN OF CARE
Resolved. Monitor neuro checks.   [place] pt to person, place  and time. Coronary artery disease is a condition where the arteries the supply the heart muscle get clogges with fatty deposits & puts you at risk for a heart attack  Call your doctor if you have any new pain, pressure, or discomfort in the center of your chest, pain, tingling or discomfort in arms, back, neck, jaw, or stomach, shortness of breath, nausea, vomiting, burping or heartburn, sweating, cold and clammy skin, racing or abnormal heartbeat for more than 10 minutes or if they keep coming & going.  Call 911 and do not tr to get to hospital by care  You can help yourself with lefestyle changes (quitting smoking if you smoke), eat lots of fruits & vegetables & low fat dairy products, not a lot of meat & fatty foods, walk or some form of physical activity most days of the week, lose weight if you are overweight  Take your cardiac medication as prescribed to lower cholesterol, to lower blood pressure, aspirin to prevent blood clots, and diabetes control  Make sure to keep appointments with doctor for cardiac follow up care Stable Weigh yourself daily.  If you gain 3lbs in 3 days, or 5lbs in a week call your Health Care Provider.  Do not eat or drink foods containing more than 2000mg of salt (sodium) in your diet every day.  Call your Health Care Provider if you have any swelling or increased swelling in your feet, ankles, and/or stomach.  Take all of your medication as directed.  If you become dizzy call your Health Care Provider. Follow up with PMD in 1  week after discharge from Rehab. Will continue to be controlled. HgA1C this admission.  Make sure you get your HgA1c checked every three months.  If you take oral diabetes medications, check your blood glucose two times a day.  If you take insulin, check your blood glucose before meals and at bedtime.  It's important not to skip any meals.  Keep a log of your blood glucose results and always take it with you to your doctor appointments.  Keep a list of your current medications including injectables and over the counter medications and bring this medication list with you to all your doctor appointments.  If you have not seen your ophthalmologist this year call for appointment.  Check your feet daily for redness, sores, or openings. Do not self treat. If no improvement in two days call your primary care physician for an appointment.  Low blood sugar (hypoglycemia) is a blood sugar below 70mg/dl. Check your blood sugar if you feel signs/symptoms of hypoglycemia. If your blood sugar is below 70 take 15 grams of carbohydrates (ex 4 oz of apple juice, 3-4 glucose tablets, or 4-6 oz of regular soda) wait 15 minutes and repeat blood sugar to make sure it comes up above 70.  If your blood sugar is above 70 and you are due for a meal, have a meal.  If you are not due for a meal have a snack.  This snack helps keeps your blood sugar at a safe range. Will continue to be well controlled. Low salt diet  Activity as tolerated.  Take all medication as prescribed.  Follow up with your medical doctor for routine blood pressure monitoring at your next visit.  Notify your doctor if you have any of the following symptoms:   Dizziness, Lightheadedness, Blurry vision, Headache, Chest pain, Shortness of breath

## 2019-01-29 NOTE — CHART NOTE - NSCHARTNOTESELECT_GEN_ALL_CORE
Nutrition Services
Event Note
Event Note
Event Note/9B AIVR
Event Note/L side weakness
Medicine follow up/Event Note
Nutrition Services
Transfer Note
Transfer Note/MAR

## 2019-01-29 NOTE — DISCHARGE NOTE ADULT - MEDICATION SUMMARY - MEDICATIONS TO TAKE
I will START or STAY ON the medications listed below when I get home from the hospital:    Avodart 0.5 mg oral capsule  -- 1 cap(s) by mouth once a day home  -- Indication: For BPH    Aspir 81 oral delayed release tablet  -- 1 tab(s) by mouth once a day, home and hospital  -- Indication: For CAD (coronary artery disease)    losartan 50 mg oral tablet  -- 1 tab(s) by mouth once a day home  -- Indication: For HTN (hypertension)    doxazosin 2 mg oral tablet  -- 1 tab(s) by mouth once a day (at bedtime)  -- Indication: For HTN (hypertension)    amiodarone 200 mg oral tablet  -- 1 tab(s) by mouth once a day, hospital  -- Indication: For Arrhymia    glimepiride 2 mg oral tablet  -- 1 tab(s) by mouth 2 times a day, Home  -- Indication: For DM (diabetes mellitus)    metFORMIN 500 mg oral tablet  -- 2 tab(s) by mouth 2 times a day home  -- Indication: For DM (diabetes mellitus)    allopurinol 300 mg oral tablet  -- 1 tab(s) by mouth once a day, home and hospital  -- Indication: For Gout    simvastatin 80 mg oral tablet  -- 1 tab(s) by mouth once a day (at bedtime), hospital  -- Indication: For CAD (coronary artery disease)    Plavix 75 mg oral tablet  -- 1 tab(s) by mouth once a day, home and hospital  -- Indication: For CAD (coronary artery disease)    metoprolol tartrate 75 mg oral tablet  -- 1 tab(s) by mouth 2 times a day  -- Indication: For Acute on chronic combined systolic and diastolic congestive heart failure    ammonium lactate 12% topical lotion  -- 1 application on skin 2 times a day  Apply to affected areas where pt complain of pruiritis.   -- Indication: For Dry skin     furosemide 40 mg oral tablet  -- 1 tab(s) by mouth once a day  -- Indication: For Acute on chronic combined systolic and diastolic congestive heart failure    saliva substitutes oral solution  -- 5 milliliter(s) by mouth 2 times a day  -- Indication: For Dry mouth    pantoprazole 40 mg oral delayed release tablet  -- 1 tab(s) by mouth once a day, home and hospital  -- Indication: For GERDS    cholecalciferol 2000 intl units oral tablet  -- 1 tab(s) by mouth once a day, hospital  -- Indication: For Supplement I will START or STAY ON the medications listed below when I get home from the hospital:    Avodart 0.5 mg oral capsule  -- 1 cap(s) by mouth once a day home  -- Indication: For BPH    Aspir 81 oral delayed release tablet  -- 1 tab(s) by mouth once a day, home and hospital  -- Indication: For CAD (coronary artery disease)    oxyCODONE 5 mg oral tablet  -- 1 tab(s) by mouth every 6 hours, As Needed X 3 days prn.   -- Indication: For Pain (Sacrum Wound)    losartan 50 mg oral tablet  -- 1 tab(s) by mouth once a day home  -- Indication: For HTN (hypertension)    doxazosin 2 mg oral tablet  -- 1 tab(s) by mouth once a day (at bedtime)  -- Indication: For HTN (hypertension)    amiodarone 200 mg oral tablet  -- 1 tab(s) by mouth once a day, hospital  -- Indication: For Arrhymia    glimepiride 2 mg oral tablet  -- 1 tab(s) by mouth 2 times a day, Home  -- Indication: For DM (diabetes mellitus)    metFORMIN 500 mg oral tablet  -- 2 tab(s) by mouth 2 times a day home  -- Indication: For DM (diabetes mellitus)    allopurinol 300 mg oral tablet  -- 1 tab(s) by mouth once a day, home and hospital  -- Indication: For Gout    simvastatin 80 mg oral tablet  -- 1 tab(s) by mouth once a day (at bedtime), hospital  -- Indication: For CAD (coronary artery disease)    Plavix 75 mg oral tablet  -- 1 tab(s) by mouth once a day, home and hospital  -- Indication: For CAD (coronary artery disease)    metoprolol tartrate 75 mg oral tablet  -- 1 tab(s) by mouth 2 times a day  -- Indication: For Acute on chronic combined systolic and diastolic congestive heart failure    ammonium lactate 12% topical lotion  -- 1 application on skin 2 times a day  Apply to affected areas where pt complain of pruiritis.   -- Indication: For Dry skin     furosemide 40 mg oral tablet  -- 1 tab(s) by mouth once a day  -- Indication: For Acute on chronic combined systolic and diastolic congestive heart failure    saliva substitutes oral solution  -- 5 milliliter(s) by mouth 2 times a day  -- Indication: For Dry mouth    pantoprazole 40 mg oral delayed release tablet  -- 1 tab(s) by mouth once a day, home and hospital  -- Indication: For GERDS    cholecalciferol 2000 intl units oral tablet  -- 1 tab(s) by mouth once a day, hospital  -- Indication: For Supplement I will START or STAY ON the medications listed below when I get home from the hospital:    Avodart 0.5 mg oral capsule  -- 1 cap(s) by mouth once a day home  -- Indication: For BPH    Aspir 81 oral delayed release tablet  -- 1 tab(s) by mouth once a day, home and hospital  -- Indication: For CAD (coronary artery disease)    oxyCODONE 5 mg oral tablet  -- 0.5 tab(s) by mouth every 6 hours, As Needed  -- Indication: For Pain    losartan 50 mg oral tablet  -- 1 tab(s) by mouth once a day home  -- Indication: For HTN (hypertension)    doxazosin 2 mg oral tablet  -- 1 tab(s) by mouth once a day (at bedtime)  -- Indication: For HTN (hypertension)    amiodarone 200 mg oral tablet  -- 1 tab(s) by mouth once a day, hospital  -- Indication: For Arrhymia    glimepiride 2 mg oral tablet  -- 1 tab(s) by mouth 2 times a day, Home  -- Indication: For DM (diabetes mellitus)    metFORMIN 500 mg oral tablet  -- 2 tab(s) by mouth 2 times a day home  -- Indication: For DM (diabetes mellitus)    allopurinol 300 mg oral tablet  -- 1 tab(s) by mouth once a day, home and hospital  -- Indication: For Gout    simvastatin 80 mg oral tablet  -- 1 tab(s) by mouth once a day (at bedtime), hospital  -- Indication: For CAD (coronary artery disease)    Plavix 75 mg oral tablet  -- 1 tab(s) by mouth once a day, home and hospital  -- Indication: For CAD (coronary artery disease)    metoprolol tartrate 75 mg oral tablet  -- 1 tab(s) by mouth 2 times a day  -- Indication: For Acute on chronic combined systolic and diastolic congestive heart failure    ammonium lactate 12% topical lotion  -- 1 application on skin 2 times a day  Apply to affected areas where pt complain of pruiritis.   -- Indication: For Dry skin     furosemide 40 mg oral tablet  -- 1 tab(s) by mouth once a day  -- Indication: For Acute on chronic combined systolic and diastolic congestive heart failure    saliva substitutes oral solution  -- 5 milliliter(s) by mouth 2 times a day  -- Indication: For Dry mouth    pantoprazole 40 mg oral delayed release tablet  -- 1 tab(s) by mouth once a day, home and hospital  -- Indication: For GERDS    cholecalciferol 2000 intl units oral tablet  -- 1 tab(s) by mouth once a day, hospital  -- Indication: For Supplement

## 2019-01-29 NOTE — DISCHARGE NOTE ADULT - MEDICATION SUMMARY - MEDICATIONS TO CHANGE
I will SWITCH the dose or number of times a day I take the medications listed below when I get home from the hospital:    metoprolol succinate 25 mg oral capsule, extended release  -- 1 cap(s) by mouth once a day, hospital    Toprol-XL 50 mg oral tablet, extended release  -- 1 tab(s) by mouth once a day home    Lasix 20 mg oral tablet  -- 1 tab(s) by mouth once a day home

## 2019-01-29 NOTE — CHART NOTE - NSCHARTNOTEFT_GEN_A_CORE
patient seen and examined.     79yo male with hx of CAD with s/p CABG 4v in 1997, multiple stents, HTN, PUD, Gout, DM2, and Acute HFrEF (EF 30-35%), presented to [facility] from [facility] for NSTEMI management after stabilization from complications of V. Fib arrest s/p ROSC after 20mins of CPR and ICU stay complicated by sepsis secondary to Aspiration PNA. Pt s/p Cardiac cath at [facility] revealing multiple vessel disease without any intervention, recommending medical optimization and stabilization prior to PCI. Currently pt undergoing neurological evaluation/workup for suspected hypoxic brain injury from cardiac arrest now plan for cath today      Problem/Plan - 1:  ·  Problem: Anoxic brain injury.  Plan: - Patient with increasing word finding abilities, visual impairments, L sided weakness   - Likely cause of acute encephalopathy with possible other contributing factors (ICU   delirium etc). Low suspicion for infection  - Cont q4h neurochecks  - Neuro consult appreciated  - Out of bed to chair, frequent reorientation  - Appreciate neuro eval- MR head, MRA head and neck unremarkable  - Appreciate ophthalmology eval- no acute intervention, outpatient f/u  - EEG with diffuse cerebral dysfunction  - PT/OT, speech/swallow f/u- recommending dysphagia 1 diet  - ENT eval appreciated- no significant pathology detected on bedside scope. VC   granuloma seen. No VC paralysis. No thrush.  - Eventual rehab when medically stable  cath today.      Problem/Plan - 2:  ·  Problem: NSTEMI (non-ST elevated myocardial infarction).  Plan: - S/p  V. Fib arrest at OSH s/p ROSC after 20mins of CPR  - S/p Cath on 1/11 which revealed Proximal LCX 95% in-stent stenosis as well as   90% RPDA stenosis.   - Continue optimized medical management  - PCI prior to D/C- discussed with cards Dr. Morgan today, should be done today by   Dr. Roberts  - Continue ASA/Plavix/Metoprolol/Statin  - TTE with EF 65%, severe MR.      Problem/Plan - 3:  ·  Problem: Acute on chronic combined systolic and diastolic congestive heart failure.  Plan: - Saturating well on RA  - TTE from outside hospital with EF 30-35%, repeat here improved with EF 65%   consistent with HFpEF  - Lasix 40mg daily PO  - Daily weights and Strict I/Os  - Per EP no AICD at this time, first medical management. awaiting  PCI as above. if   pt stented he will need medical therapy for 3 mo before consideration of AICD.  - Continue to monitor respiratory status.      Problem/Plan - 4:  ·  Problem: Coronary artery disease involving native coronary artery of native heart, angina presence unspecified.  Plan: - S/p CABG  - NSTEMI c/b V Fib arrest s/p ROSC after 20mins of CPR.  - Continue Amiodarone  - Cont medical management as above.      Problem/Plan - 5:  ·  Problem: Macrocytic anemia.  Plan: - Stable Hb at 11 with MCV> 100  - B12/Folate WNL    HROMBOCYTOPENIA- unclear cause. improving. hold dvt ppx if plts drop <50,000.      Problem/Plan - 6:  Problem: Type 2 diabetes mellitus with complication, with long-term current use of insulin. Plan: - Chronic  - Continue SSI +Fingerstick  - Continue premeal Humalog 2 units 3x daily  - Not requiring basal insulin at this time.     Problem/Plan - 7:  ·  Problem: Essential hypertension.  Plan: - Continue losartan  - Monitor vitals.      Problem/Plan - 8:  ·  Problem: Fever, unspecified fever cause.  Plan: - Patient with low grade fever yesterday.  - UA with no indication of UTI.  - Chest Xray with no evidence of PNA. Atelectasis is visible.   - No overt signs of sepsis or infection.   - Close monitoring. If signs of infection, initiate ABX.      Problem/Plan - 9:  ·  Problem: Prophylactic measure.  Plan: DVT PPx SQH  Aspiration precaution- speech/swallow eval   Fall precaution  Planning for [facility] with likely transition to LTC. Pending PCI today. patient seen and examined.     79yo male with hx of CAD with s/p CABG 4v in 1997, multiple stents, HTN, PUD, Gout, DM2, and Acute HFrEF (EF 30-35%), presented to [facility] from [facility] for NSTEMI management after stabilization from complications of V. Fib arrest s/p ROSC after 20mins of CPR and ICU stay complicated by sepsis secondary to Aspiration PNA. Pt s/p Cardiac cath at [facility] revealing multiple vessel disease without any intervention, recommending medical optimization and stabilization prior to PCI. patient had Cath 1/28 s/p PCI to left main and LCx, unable to intervene on . patient had suspected anoxic brain injury with increasing word finding abilities, visual impairments, L sided weakness likely due to cardiac arrest. patient evaluated by neurology. MR head, MRA head and neck unremarkable. EEG without seizure. patient had dysphagia, evaluated by ENT s/p laryngoscopy without VC paralysis. patient recommended dysphagia 1 diet with nectar thick liquids.     Currently pt undergoing neurological evaluation/workup for suspected hypoxic brain injury from cardiac arrest now plan for cath today        Problem/Plan - 2:  ·  Problem: NSTEMI (non-ST elevated myocardial infarction).  Plan: - S/p  V. Fib arrest at OSH s/p ROSC after 20mins of CPR  - S/p Cath on 1/11 which revealed Proximal LCX 95% in-stent stenosis as well as   90% RPDA stenosis.   - Continue optimized medical management  - PCI prior to D/C- discussed with cards Dr. Morgan today, should be done today by   Dr. Roberts  - Continue ASA/Plavix/Metoprolol/Statin  - TTE with EF 65%, severe MR.      Problem/Plan - 3:  ·  Problem: Acute on chronic combined systolic and diastolic congestive heart failure.  Plan: - Saturating well on RA  - TTE from outside hospital with EF 30-35%, repeat here improved with EF 65%   consistent with HFpEF  - Lasix 40mg daily PO  - Daily weights and Strict I/Os  - Per EP no AICD at this time, first medical management. awaiting  PCI as above. if   pt stented he will need medical therapy for 3 mo before consideration of AICD.  - Continue to monitor respiratory status.      Problem/Plan - 4:  ·  Problem: Coronary artery disease involving native coronary artery of native heart, angina presence unspecified.  Plan: - S/p CABG  - NSTEMI c/b V Fib arrest s/p ROSC after 20mins of CPR.  - Continue Amiodarone  - Cont medical management as above.      Problem/Plan - 5:  ·  Problem: Macrocytic anemia.  Plan: - Stable Hb at 11 with MCV> 100  - B12/Folate WNL    HROMBOCYTOPENIA- unclear cause. improving. hold dvt ppx if plts drop <50,000.      Problem/Plan - 6:  Problem: Type 2 diabetes mellitus with complication, with long-term current use of insulin. Plan: - Chronic  - Continue SSI +Fingerstick  - Continue premeal Humalog 2 units 3x daily  - Not requiring basal insulin at this time.     Problem/Plan - 7:  ·  Problem: Essential hypertension.  Plan: - Continue losartan  - Monitor vitals.      Problem/Plan - 8:  ·  Problem: Fever, unspecified fever cause.  Plan: - Patient with low grade fever yesterday.  - UA with no indication of UTI.  - Chest Xray with no evidence of PNA. Atelectasis is visible.   - No overt signs of sepsis or infection.   - Close monitoring. If signs of infection, initiate ABX.      Problem/Plan - 9:  ·  Problem: Prophylactic measure.  Plan: DVT PPx SQH  Aspiration precaution- speech/swallow eval   Fall precaution  Planning for [facility] with likely transition to LTC. Pending PCI today. patient seen and examined.     79yo male with hx of CAD with s/p CABG 4v in 1997, multiple stents, HTN, PUD, Gout, DM2, and Acute HFrEF (EF 30-35%), presented to [facility] from [facility] for NSTEMI management after stabilization from complications of V. Fib arrest s/p ROSC after 20mins of CPR and ICU stay complicated by sepsis secondary to Aspiration PNA. Pt s/p Cardiac cath at [facility] revealing multiple vessel disease without any intervention, recommending medical optimization and stabilization prior to PCI. patient had Cath 1/28 s/p PCI to left main and LCx, unable to intervene on RPDA. patient had suspected anoxic brain injury with increasing word finding abilities, visual impairments, L sided weakness likely due to cardiac arrest. patient evaluated by neurology. MR head, MRA head and neck unremarkable. EEG without seizure. patient had dysphagia, evaluated by ENT s/p laryngoscopy without VC paralysis. patient recommended dysphagia 1 diet with nectar thick liquids.     Currently pt undergoing neurological evaluation/workup for suspected hypoxic brain injury from cardiac arrest now plan for cath today        Problem/Plan - 2:  ·  Problem: NSTEMI (non-ST elevated myocardial infarction).  Plan: - S/p  V. Fib arrest at OSH s/p ROSC after 20mins of CPR  - S/p Cath on 1/11 which revealed Proximal LCX 95% in-stent stenosis as well as   90% RPDA stenosis.   - Continue optimized medical management  - PCI prior to D/C- discussed with cards Dr. Morgan today, should be done today by   Dr. Roberts  - Continue ASA/Plavix/Metoprolol/Statin  - TTE with EF 65%, severe MR.      Problem/Plan - 3:  ·  Problem: Acute on chronic combined systolic and diastolic congestive heart failure.  Plan: - Saturating well on RA  - TTE from outside hospital with EF 30-35%, repeat here improved with EF 65%   consistent with HFpEF  - Lasix 40mg daily PO  - Daily weights and Strict I/Os  - Per EP no AICD at this time, first medical management. awaiting  PCI as above. if   pt stented he will need medical therapy for 3 mo before consideration of AICD.  - Continue to monitor respiratory status.      Problem/Plan - 4:  ·  Problem: Coronary artery disease involving native coronary artery of native heart, angina presence unspecified.  Plan: - S/p CABG  - NSTEMI c/b V Fib arrest s/p ROSC after 20mins of CPR.  - Continue Amiodarone  - Cont medical management as above.      Problem/Plan - 5:  ·  Problem: Macrocytic anemia.  Plan: - Stable Hb at 11 with MCV> 100  - B12/Folate WNL    HROMBOCYTOPENIA- unclear cause. improving. hold dvt ppx if plts drop <50,000.      Problem/Plan - 6:  Problem: Type 2 diabetes mellitus with complication, with long-term current use of insulin. Plan: - Chronic  - Continue SSI +Fingerstick  - Continue premeal Humalog 2 units 3x daily  - Not requiring basal insulin at this time.     Problem/Plan - 7:  ·  Problem: Essential hypertension.  Plan: - Continue losartan  - Monitor vitals.      Problem/Plan - 8:  ·  Problem: Fever, unspecified fever cause.  Plan: - Patient with low grade fever yesterday.  - UA with no indication of UTI.  - Chest Xray with no evidence of PNA. Atelectasis is visible.   - No overt signs of sepsis or infection.   - Close monitoring. If signs of infection, initiate ABX.      Problem/Plan - 9:  ·  Problem: Prophylactic measure.  Plan: DVT PPx SQH  Aspiration precaution- speech/swallow eval   Fall precaution  Planning for [facility] with likely transition to LTC. Pending PCI today. patient seen and examined.     77yo male with hx of CAD with s/p CABG 4v in 1997, multiple stents, HTN, PUD, Gout, DM2, and Acute HFrEF (EF 30-35%), presented to [facility] from [facility] for NSTEMI management after stabilization from complications of V. Fib arrest s/p ROSC after 20mins of CPR and ICU stay complicated by sepsis secondary to Aspiration PNA. Pt s/p Cardiac cath at [facility] revealing multiple vessel disease without any intervention, recommending medical optimization and stabilization prior to PCI. patient had Cath 1/28 s/p Successful PCI to the LM-LCx with MARIA DEL CARMEN after  laser atherectomy. The RPDA was re-imaged and appeared less stenotic and smooth (as opposed to the ulcerated appearance previously) per cath report. post cath, patient had widening of QRS and EKG reviewed with Dr. Roberts thought related to rate and RBBB. TTE with EF 65% with severe MR. patient had suspected anoxic brain injury with increasing word finding abilities, visual impairments, L sided weakness likely due to cardiac arrest. patient evaluated by neurology. MR head, MRA head and neck unremarkable. EEG without seizure. patient had dysphagia, evaluated by ENT s/p laryngoscopy without VC paralysis. patient recommended dysphagia 1 diet with nectar thick liquids. thrombocytopenia stable. no s/s of bleed. FS elevated with hga1c 7.8 and started on premeal insulin.     patient evaluated by PT and recommended for [facility]. patient likely transition to LTC.     discharge time - 46 minutes  Dr. M. Luke  Medicine Hospitalist  [phone]

## 2019-01-29 NOTE — DISCHARGE NOTE ADULT - SECONDARY DIAGNOSIS.
NSTEMI (non-ST elevated myocardial infarction) Acute on chronic combined systolic and diastolic congestive heart failure CAD (coronary artery disease) Macrocytic anemia DM (diabetes mellitus) HTN (hypertension)

## 2019-01-31 LAB
CULTURE RESULTS: SIGNIFICANT CHANGE UP
CULTURE RESULTS: SIGNIFICANT CHANGE UP
SPECIMEN SOURCE: SIGNIFICANT CHANGE UP
SPECIMEN SOURCE: SIGNIFICANT CHANGE UP

## 2023-01-17 NOTE — PATIENT PROFILE ADULT - NSPROPOAPRESSUREINJURY_GEN_A_NUR
Quality 130: Documentation Of Current Medications In The Medical Record: Current Medications Documented
Detail Level: Detailed
Quality 110: Preventive Care And Screening: Influenza Immunization: Influenza Immunization Administered during Influenza season
Quality 226: Preventive Care And Screening: Tobacco Use: Screening And Cessation Intervention: Patient screened for tobacco use and is an ex/non-smoker
Quality 111:Pneumonia Vaccination Status For Older Adults: Pneumococcal vaccine (PPSV23) administered on or after patient’s 60th birthday and before the end of the measurement period
yes

## 2023-03-27 NOTE — PROGRESS NOTE ADULT - ASSESSMENT
77yo male with hx of CAD with s/p CABG 4v in 1997, multiple stents, HTN, PUD, Gout, DM2, and Acute HFrEF (EF 30-35%), presented to [facility] from [facility] for NSTEMI management after stabilization from complications of V. Fib arrest s/p ROSC after 20mins of CPR and ICU stay complicated by sepsis secondary to Aspiration PNA. Pt s/p Cardiac cath at [facility] revealing multiple vessel disease without any intervention, recommending medical optimization and stabilization prior to PCI. 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     MRI/MRA  EXAM:  MR ANGIO BRAIN                        EXAM:  MR ANGIO NECK                        EXAM:  MR BRAIN                          PROCEDURE DATE:  01/17/2019      INTERPRETATION:  History: Left-sided weakness. V. fib arrest for 20   minutes with CPR..  Description: A noncontrast brain MRI, 3-D time-of-flight brain MRA, 3D   TOF neck MRA, and 2D time of flight neck MRA were performed.  Comparison: Head CT 01/12/2019..    Brain MRI:  Sagittal T1, axial T1, T2, FLAIR, SWI, GRE, and diffusion-weighted series   with ADC maps were performed.  There is no evidence for acute infarct, acute hemorrhage, mass effect, or   hydrocephalus.  A punctate focus of decreased SWI signal involves the right temporal lobe   without surrounding edema which may reflect an old microbleed,   calcification, or a tiny cavernous malformation.  Moderate patchy foci of increased T2 and FLAIR signal are present in the   periventricular and subcortical white matter which most likely represent   chronic microvascular ischemic changes given the patient's age.   Cerebral volume loss is present.    Brain MRA:  There is no evidence for focal stenosis, major vessel occlusion, or   aneurysm. Tiny aneurysms could be beyond the resolution of MRA technique.    Neck MRA:  No significant carotid or vertebral artery stenosis is noted in the neck   by NASCET criteria.    IMPRESSION:  1. On the brain MRI, there is no evidence for acute infarct or acute   hemorrhage. Moderate chronic white matter changes are present.  2. No evidence for significant intracranial or extracranial stenosis on   the MRA studies.    FREDERIC NASH M.D., ATTENDING RADIOLOGIST  This document has been electronically signed. Jan 18 2019  8:52AM ambulatory

## 2024-03-26 NOTE — PROGRESS NOTE ADULT - PROBLEM SELECTOR PLAN 1
0 -pt with increasing word finding abilities, visual impairments, L sided weakness   -likely cause of acute encephalopathy with possible other contributing factors (ICU delirium etc). Low suspicion for infection  -cont q4h neurochecks  -neuro consult appreciated  -out of bed to chair, frequent reorientation  -appreciate neuro eval- MR head, MRA head and neck unremarkable  -appreciate ophthalmology eval- no acute intervention, outpatient f/u  -EEG with diffuse cerebral dysfunction  -PT/OT, speech/swallow f/u- recommending dysphagia 1 diet  -ENT eval appreciated- no significant pathology detected on bedside scope. VC granuloma seen. No VC paralysis.  -eventual rehab when medically stable, awaiting PCI

## 2024-11-06 NOTE — SWALLOW VFSS/MBS ASSESSMENT ADULT - RESIDUE OF POSTERIOR PHARYNGEAL WALL
The patient's goals for the shift include      The clinical goals for the shift include pt will remain injury free         Trace

## 2024-12-16 NOTE — PROGRESS NOTE ADULT - PROBLEM SELECTOR PROBLEM 4
Coronary artery disease involving native coronary artery of native heart, angina presence unspecified 4 = No assist / stand by assistance